# Patient Record
Sex: FEMALE | Race: WHITE | NOT HISPANIC OR LATINO | Employment: FULL TIME | URBAN - METROPOLITAN AREA
[De-identification: names, ages, dates, MRNs, and addresses within clinical notes are randomized per-mention and may not be internally consistent; named-entity substitution may affect disease eponyms.]

---

## 2021-06-05 ENCOUNTER — TRANSFERRED RECORDS (OUTPATIENT)
Dept: HEALTH INFORMATION MANAGEMENT | Facility: CLINIC | Age: 41
End: 2021-06-05

## 2021-06-09 LAB
CREAT SERPL-MCNC: 0.59 MG/DL (ref 0.57–1.11)
GFR SERPL CREATININE-BSD FRML MDRD: >60 ML/MIN/1.73M(2)
GLUCOSE SERPL-MCNC: 121 MG/DL (ref 70–100)
POTASSIUM SERPL-SCNC: 4 MMOL/L (ref 3.5–5.1)

## 2021-06-10 NOTE — PROGRESS NOTES
[facility]  Transfer Triage Note    Date of call: 06/09/21  Time of call: 9:09 PM    Is pandemic COVID-19 a concern? NO    Reason for transfer: Procedure can be done here and not at referring hospital  Further diagnostic work up, management, and consultation for specialized care   Diagnosis: concern for necrotizing pancreatitis     Outside Records: Not available  Additional records requested to be faxed to [phone].    Stability of Patient: Patient is at risk for instability, however patient requires urgent transfer and does not meet ICU criteria   ICU: No    Expected Time of Arrival for Transfer: greater than 24 hours    Arrival Location:  [address] [address] Phone: [phone]    Recommendations for Management and Stabilization: Not needed    Additional Comments:      Patient is a 40-year-old woman with recent suspected gallstone pancreatitis, s/p CCY in April 2021, who has been admitted several times at [place] for complications since.  She has since developed a fluid collection, admitted 6/4 for abdominal pain and vomiting, has since in the last 24 hours developed worsening tachycardia, fever, otherwise blood pressure is stable on 1 L of oxygen (primarily for comfort without significant respiratory symptoms).  Overall concern for necrotizing pancreatitis and potential infection, currently on meropenem and vancomycin.    Most recent imaging from 6/7 demonstrates a 6.3 cm fluid collection in the pancreas.  [facility] provider indicates that this patient was discussed in multidisciplinary conference today that included Dr. Freeman of GI panc/bili who suggest she may require transfer for endoscopic management ([facility] do have IR capabilities however).     No PACS imaging available in our system, nor [facility] records available.     At this point Dr. Beck recommends [facility] team push images in PACS for  direct review. Will place on the wait list but discussed that there is no bed available tonight at [facility], though would be reasonable for med-surg, no tele bed. GI will get back to [place] team tomorrow regarding potential for IR intervention instead vs timing of transfer vs other management recommendations. Depending on timing, suggest an updated doc-to-doc discussion to ensure med-surg is still appropriate level of care.     Josie Perez MD

## 2021-06-13 ENCOUNTER — HOSPITAL ENCOUNTER (INPATIENT)
Facility: CLINIC | Age: 41
LOS: 9 days | Discharge: HOME HEALTH | End: 2021-06-23
Attending: INTERNAL MEDICINE | Admitting: INTERNAL MEDICINE
Payer: COMMERCIAL

## 2021-06-13 ENCOUNTER — TRANSFERRED RECORDS (OUTPATIENT)
Dept: HEALTH INFORMATION MANAGEMENT | Facility: CLINIC | Age: 41
End: 2021-06-13

## 2021-06-13 DIAGNOSIS — K85.12 ACUTE BILIARY PANCREATITIS WITH INFECTED NECROSIS: Primary | ICD-10-CM

## 2021-06-13 LAB
CREAT SERPL-MCNC: 0.54 MG/DL (ref 0.57–1.11)
GFR SERPL CREATININE-BSD FRML MDRD: >60 ML/MIN/1.73M2
GLUCOSE SERPL-MCNC: 123 MG/DL (ref 70–100)
POTASSIUM SERPL-SCNC: 4.5 MMOL/L (ref 3.5–5.1)

## 2021-06-14 ENCOUNTER — APPOINTMENT (OUTPATIENT)
Dept: GENERAL RADIOLOGY | Facility: CLINIC | Age: 41
End: 2021-06-14
Attending: INTERNAL MEDICINE
Payer: COMMERCIAL

## 2021-06-14 ENCOUNTER — APPOINTMENT (OUTPATIENT)
Dept: GENERAL RADIOLOGY | Facility: CLINIC | Age: 41
End: 2021-06-14
Attending: STUDENT IN AN ORGANIZED HEALTH CARE EDUCATION/TRAINING PROGRAM
Payer: COMMERCIAL

## 2021-06-14 PROBLEM — K85.90 PANCREATITIS: Status: ACTIVE | Noted: 2021-06-14

## 2021-06-14 LAB
ALBUMIN SERPL-MCNC: 2 G/DL (ref 3.4–5)
ALP SERPL-CCNC: 61 U/L (ref 40–150)
ALT SERPL W P-5'-P-CCNC: 14 U/L (ref 0–50)
ANION GAP SERPL CALCULATED.3IONS-SCNC: 2 MMOL/L (ref 3–14)
APTT PPP: 49 SEC (ref 22–37)
AST SERPL W P-5'-P-CCNC: 12 U/L (ref 0–45)
BASOPHILS # BLD AUTO: 0.1 10E9/L (ref 0–0.2)
BASOPHILS NFR BLD AUTO: 0.5 %
BILIRUB SERPL-MCNC: 0.5 MG/DL (ref 0.2–1.3)
BUN SERPL-MCNC: 6 MG/DL (ref 7–30)
CALCIUM SERPL-MCNC: 8.4 MG/DL (ref 8.5–10.1)
CHLORIDE SERPL-SCNC: 100 MMOL/L (ref 94–109)
CO2 SERPL-SCNC: 32 MMOL/L (ref 20–32)
CREAT SERPL-MCNC: 0.51 MG/DL (ref 0.52–1.04)
CRP SERPL-MCNC: 120 MG/L (ref 0–8)
DIFFERENTIAL METHOD BLD: ABNORMAL
EOSINOPHIL # BLD AUTO: 0.5 10E9/L (ref 0–0.7)
EOSINOPHIL NFR BLD AUTO: 5 %
ERYTHROCYTE [DISTWIDTH] IN BLOOD BY AUTOMATED COUNT: 15.7 % (ref 10–15)
GFR SERPL CREATININE-BSD FRML MDRD: >90 ML/MIN/{1.73_M2}
GLUCOSE SERPL-MCNC: 98 MG/DL (ref 70–99)
HCT VFR BLD AUTO: 25.6 % (ref 35–47)
HGB BLD-MCNC: 7.7 G/DL (ref 11.7–15.7)
IMM GRANULOCYTES # BLD: 0.2 10E9/L (ref 0–0.4)
IMM GRANULOCYTES NFR BLD: 1.9 %
INR PPP: 1.12 (ref 0.86–1.14)
INTERPRETATION ECG - MUSE: NORMAL
LABORATORY COMMENT REPORT: NORMAL
LACTATE BLD-SCNC: 0.9 MMOL/L (ref 0.7–2)
LYMPHOCYTES # BLD AUTO: 1.2 10E9/L (ref 0.8–5.3)
LYMPHOCYTES NFR BLD AUTO: 11.3 %
MAGNESIUM SERPL-MCNC: 2.2 MG/DL (ref 1.6–2.3)
MCH RBC QN AUTO: 25.8 PG (ref 26.5–33)
MCHC RBC AUTO-ENTMCNC: 30.1 G/DL (ref 31.5–36.5)
MCV RBC AUTO: 86 FL (ref 78–100)
MONOCYTES # BLD AUTO: 0.9 10E9/L (ref 0–1.3)
MONOCYTES NFR BLD AUTO: 8.4 %
NEUTROPHILS # BLD AUTO: 7.5 10E9/L (ref 1.6–8.3)
NEUTROPHILS NFR BLD AUTO: 72.9 %
NRBC # BLD AUTO: 0 10*3/UL
NRBC BLD AUTO-RTO: 0 /100
PHOSPHATE SERPL-MCNC: 4.3 MG/DL (ref 2.5–4.5)
PLATELET # BLD AUTO: 425 10E9/L (ref 150–450)
POTASSIUM SERPL-SCNC: 4.1 MMOL/L (ref 3.4–5.3)
PROCALCITONIN SERPL-MCNC: 0.47 NG/ML
PROT SERPL-MCNC: 6.9 G/DL (ref 6.8–8.8)
RBC # BLD AUTO: 2.99 10E12/L (ref 3.8–5.2)
SARS-COV-2 RNA RESP QL NAA+PROBE: NEGATIVE
SODIUM SERPL-SCNC: 133 MMOL/L (ref 133–144)
SPECIMEN SOURCE: NORMAL
WBC # BLD AUTO: 10.3 10E9/L (ref 4–11)

## 2021-06-14 PROCEDURE — 87040 BLOOD CULTURE FOR BACTERIA: CPT | Performed by: STUDENT IN AN ORGANIZED HEALTH CARE EDUCATION/TRAINING PROGRAM

## 2021-06-14 PROCEDURE — 999N000044 HC STATISTIC CVC DRESSING CHANGE

## 2021-06-14 PROCEDURE — 93010 ELECTROCARDIOGRAM REPORT: CPT | Performed by: INTERNAL MEDICINE

## 2021-06-14 PROCEDURE — 84100 ASSAY OF PHOSPHORUS: CPT | Performed by: STUDENT IN AN ORGANIZED HEALTH CARE EDUCATION/TRAINING PROGRAM

## 2021-06-14 PROCEDURE — U0005 INFEC AGEN DETEC AMPLI PROBE: HCPCS | Performed by: STUDENT IN AN ORGANIZED HEALTH CARE EDUCATION/TRAINING PROGRAM

## 2021-06-14 PROCEDURE — U0003 INFECTIOUS AGENT DETECTION BY NUCLEIC ACID (DNA OR RNA); SEVERE ACUTE RESPIRATORY SYNDROME CORONAVIRUS 2 (SARS-COV-2) (CORONAVIRUS DISEASE [COVID-19]), AMPLIFIED PROBE TECHNIQUE, MAKING USE OF HIGH THROUGHPUT TECHNOLOGIES AS DESCRIBED BY CMS-2020-01-R: HCPCS | Performed by: STUDENT IN AN ORGANIZED HEALTH CARE EDUCATION/TRAINING PROGRAM

## 2021-06-14 PROCEDURE — 99222 1ST HOSP IP/OBS MODERATE 55: CPT | Performed by: PHYSICIAN ASSISTANT

## 2021-06-14 PROCEDURE — 250N000013 HC RX MED GY IP 250 OP 250 PS 637: Performed by: STUDENT IN AN ORGANIZED HEALTH CARE EDUCATION/TRAINING PROGRAM

## 2021-06-14 PROCEDURE — 84145 PROCALCITONIN (PCT): CPT | Performed by: STUDENT IN AN ORGANIZED HEALTH CARE EDUCATION/TRAINING PROGRAM

## 2021-06-14 PROCEDURE — 999N000065 XR CHEST PORT 1 VIEW

## 2021-06-14 PROCEDURE — 83605 ASSAY OF LACTIC ACID: CPT | Performed by: INTERNAL MEDICINE

## 2021-06-14 PROCEDURE — 999N000065 XR ABDOMEN PORT 1 VIEWS

## 2021-06-14 PROCEDURE — 36415 COLL VENOUS BLD VENIPUNCTURE: CPT | Performed by: INTERNAL MEDICINE

## 2021-06-14 PROCEDURE — 250N000011 HC RX IP 250 OP 636: Performed by: STUDENT IN AN ORGANIZED HEALTH CARE EDUCATION/TRAINING PROGRAM

## 2021-06-14 PROCEDURE — 83735 ASSAY OF MAGNESIUM: CPT | Performed by: STUDENT IN AN ORGANIZED HEALTH CARE EDUCATION/TRAINING PROGRAM

## 2021-06-14 PROCEDURE — 74018 RADEX ABDOMEN 1 VIEW: CPT | Mod: 26 | Performed by: RADIOLOGY

## 2021-06-14 PROCEDURE — 93005 ELECTROCARDIOGRAM TRACING: CPT

## 2021-06-14 PROCEDURE — 86140 C-REACTIVE PROTEIN: CPT | Performed by: STUDENT IN AN ORGANIZED HEALTH CARE EDUCATION/TRAINING PROGRAM

## 2021-06-14 PROCEDURE — 258N000003 HC RX IP 258 OP 636: Performed by: STUDENT IN AN ORGANIZED HEALTH CARE EDUCATION/TRAINING PROGRAM

## 2021-06-14 PROCEDURE — 85025 COMPLETE CBC W/AUTO DIFF WBC: CPT | Performed by: STUDENT IN AN ORGANIZED HEALTH CARE EDUCATION/TRAINING PROGRAM

## 2021-06-14 PROCEDURE — 120N000002 HC R&B MED SURG/OB UMMC

## 2021-06-14 PROCEDURE — 99223 1ST HOSP IP/OBS HIGH 75: CPT | Mod: AI | Performed by: INTERNAL MEDICINE

## 2021-06-14 PROCEDURE — 85610 PROTHROMBIN TIME: CPT | Performed by: STUDENT IN AN ORGANIZED HEALTH CARE EDUCATION/TRAINING PROGRAM

## 2021-06-14 PROCEDURE — 999N000127 HC STATISTIC PERIPHERAL IV START W US GUIDANCE

## 2021-06-14 PROCEDURE — 80053 COMPREHEN METABOLIC PANEL: CPT | Performed by: STUDENT IN AN ORGANIZED HEALTH CARE EDUCATION/TRAINING PROGRAM

## 2021-06-14 PROCEDURE — 36415 COLL VENOUS BLD VENIPUNCTURE: CPT | Performed by: STUDENT IN AN ORGANIZED HEALTH CARE EDUCATION/TRAINING PROGRAM

## 2021-06-14 PROCEDURE — 71045 X-RAY EXAM CHEST 1 VIEW: CPT | Mod: 26 | Performed by: RADIOLOGY

## 2021-06-14 PROCEDURE — 85730 THROMBOPLASTIN TIME PARTIAL: CPT | Performed by: STUDENT IN AN ORGANIZED HEALTH CARE EDUCATION/TRAINING PROGRAM

## 2021-06-14 RX ORDER — AMINO AC/PROTEIN HYDR/WHEY PRO 10G-100/30
1 LIQUID (ML) ORAL 4 TIMES DAILY
Status: DISCONTINUED | OUTPATIENT
Start: 2021-06-14 | End: 2021-06-16

## 2021-06-14 RX ORDER — OXYCODONE HCL 5 MG/5 ML
5 SOLUTION, ORAL ORAL EVERY 6 HOURS PRN
Status: ON HOLD | COMMUNITY
End: 2021-06-23

## 2021-06-14 RX ORDER — ALBUTEROL SULFATE 0.83 MG/ML
2.5 SOLUTION RESPIRATORY (INHALATION) EVERY 4 HOURS PRN
COMMUNITY

## 2021-06-14 RX ORDER — LIDOCAINE 40 MG/G
CREAM TOPICAL
Status: DISCONTINUED | OUTPATIENT
Start: 2021-06-14 | End: 2021-06-23 | Stop reason: HOSPADM

## 2021-06-14 RX ORDER — NALOXONE HYDROCHLORIDE 0.4 MG/ML
0.2 INJECTION, SOLUTION INTRAMUSCULAR; INTRAVENOUS; SUBCUTANEOUS
Status: DISCONTINUED | OUTPATIENT
Start: 2021-06-14 | End: 2021-06-23 | Stop reason: HOSPADM

## 2021-06-14 RX ORDER — ALPRAZOLAM 0.5 MG
0.5 TABLET ORAL 2 TIMES DAILY PRN
COMMUNITY

## 2021-06-14 RX ORDER — VITAMIN B COMPLEX
1 TABLET ORAL DAILY
COMMUNITY

## 2021-06-14 RX ORDER — ACETAMINOPHEN 325 MG/1
650 TABLET ORAL EVERY 4 HOURS PRN
Status: DISCONTINUED | OUTPATIENT
Start: 2021-06-14 | End: 2021-06-14

## 2021-06-14 RX ORDER — NALOXONE HYDROCHLORIDE 0.4 MG/ML
0.4 INJECTION, SOLUTION INTRAMUSCULAR; INTRAVENOUS; SUBCUTANEOUS
Status: DISCONTINUED | OUTPATIENT
Start: 2021-06-14 | End: 2021-06-23 | Stop reason: HOSPADM

## 2021-06-14 RX ORDER — HEPARIN SODIUM 5000 [USP'U]/.5ML
5000 INJECTION, SOLUTION INTRAVENOUS; SUBCUTANEOUS EVERY 12 HOURS
Status: DISCONTINUED | OUTPATIENT
Start: 2021-06-14 | End: 2021-06-23 | Stop reason: HOSPADM

## 2021-06-14 RX ORDER — ACETAMINOPHEN 325 MG/10.15ML
650 LIQUID ORAL EVERY 4 HOURS PRN
Status: DISCONTINUED | OUTPATIENT
Start: 2021-06-14 | End: 2021-06-17

## 2021-06-14 RX ORDER — PROCHLORPERAZINE MALEATE 5 MG
10 TABLET ORAL EVERY 6 HOURS PRN
Status: DISCONTINUED | OUTPATIENT
Start: 2021-06-14 | End: 2021-06-23 | Stop reason: HOSPADM

## 2021-06-14 RX ORDER — OXYCODONE HCL 5 MG/5 ML
5 SOLUTION, ORAL ORAL EVERY 4 HOURS PRN
Status: DISCONTINUED | OUTPATIENT
Start: 2021-06-14 | End: 2021-06-14

## 2021-06-14 RX ORDER — ONDANSETRON 2 MG/ML
4 INJECTION INTRAMUSCULAR; INTRAVENOUS EVERY 6 HOURS PRN
Status: DISCONTINUED | OUTPATIENT
Start: 2021-06-14 | End: 2021-06-23 | Stop reason: HOSPADM

## 2021-06-14 RX ORDER — MEROPENEM 1 G/1
1 INJECTION, POWDER, FOR SOLUTION INTRAVENOUS EVERY 8 HOURS
Status: DISCONTINUED | OUTPATIENT
Start: 2021-06-14 | End: 2021-06-15

## 2021-06-14 RX ORDER — DEXTROSE MONOHYDRATE 100 MG/ML
INJECTION, SOLUTION INTRAVENOUS CONTINUOUS PRN
Status: DISCONTINUED | OUTPATIENT
Start: 2021-06-14 | End: 2021-06-23 | Stop reason: HOSPADM

## 2021-06-14 RX ORDER — PROCHLORPERAZINE 25 MG
25 SUPPOSITORY, RECTAL RECTAL EVERY 12 HOURS PRN
Status: DISCONTINUED | OUTPATIENT
Start: 2021-06-14 | End: 2021-06-23 | Stop reason: HOSPADM

## 2021-06-14 RX ORDER — ALBUTEROL SULFATE 90 UG/1
2 AEROSOL, METERED RESPIRATORY (INHALATION) EVERY 6 HOURS PRN
Status: DISCONTINUED | OUTPATIENT
Start: 2021-06-14 | End: 2021-06-23 | Stop reason: HOSPADM

## 2021-06-14 RX ORDER — ONDANSETRON 4 MG/1
TABLET, FILM COATED ORAL EVERY 6 HOURS PRN
COMMUNITY

## 2021-06-14 RX ORDER — OXYCODONE HCL 5 MG/5 ML
5-10 SOLUTION, ORAL ORAL EVERY 4 HOURS PRN
Status: DISCONTINUED | OUTPATIENT
Start: 2021-06-14 | End: 2021-06-22

## 2021-06-14 RX ORDER — SODIUM CHLORIDE, SODIUM LACTATE, POTASSIUM CHLORIDE, CALCIUM CHLORIDE 600; 310; 30; 20 MG/100ML; MG/100ML; MG/100ML; MG/100ML
INJECTION, SOLUTION INTRAVENOUS CONTINUOUS
Status: DISCONTINUED | OUTPATIENT
Start: 2021-06-14 | End: 2021-06-16

## 2021-06-14 RX ORDER — FLUTICASONE PROPIONATE 110 UG/1
2 AEROSOL, METERED RESPIRATORY (INHALATION) 2 TIMES DAILY
COMMUNITY

## 2021-06-14 RX ORDER — ALBUTEROL SULFATE 90 UG/1
1-2 AEROSOL, METERED RESPIRATORY (INHALATION) EVERY 4 HOURS PRN
COMMUNITY

## 2021-06-14 RX ORDER — IBUPROFEN 100 MG/5ML
600 SUSPENSION, ORAL (FINAL DOSE FORM) ORAL EVERY 6 HOURS PRN
COMMUNITY

## 2021-06-14 RX ORDER — ONDANSETRON 4 MG/1
4 TABLET, ORALLY DISINTEGRATING ORAL EVERY 6 HOURS PRN
Status: DISCONTINUED | OUTPATIENT
Start: 2021-06-14 | End: 2021-06-23 | Stop reason: HOSPADM

## 2021-06-14 RX ADMIN — ONDANSETRON 4 MG: 2 INJECTION INTRAMUSCULAR; INTRAVENOUS at 18:04

## 2021-06-14 RX ADMIN — ACETAMINOPHEN 650 MG: 325 SOLUTION ORAL at 15:16

## 2021-06-14 RX ADMIN — ACETAMINOPHEN 650 MG: 325 TABLET, FILM COATED ORAL at 06:04

## 2021-06-14 RX ADMIN — HEPARIN SODIUM 5000 UNITS: 5000 INJECTION, SOLUTION INTRAVENOUS; SUBCUTANEOUS at 09:47

## 2021-06-14 RX ADMIN — ACETAMINOPHEN 650 MG: 325 SOLUTION ORAL at 22:33

## 2021-06-14 RX ADMIN — ONDANSETRON 4 MG: 2 INJECTION INTRAMUSCULAR; INTRAVENOUS at 11:38

## 2021-06-14 RX ADMIN — SODIUM CHLORIDE, POTASSIUM CHLORIDE, SODIUM LACTATE AND CALCIUM CHLORIDE 1000 ML: 600; 310; 30; 20 INJECTION, SOLUTION INTRAVENOUS at 06:29

## 2021-06-14 RX ADMIN — OXYCODONE HYDROCHLORIDE 10 MG: 5 SOLUTION ORAL at 20:32

## 2021-06-14 RX ADMIN — MEROPENEM 1 G: 1 INJECTION, POWDER, FOR SOLUTION INTRAVENOUS at 04:07

## 2021-06-14 RX ADMIN — SODIUM CHLORIDE, POTASSIUM CHLORIDE, SODIUM LACTATE AND CALCIUM CHLORIDE: 600; 310; 30; 20 INJECTION, SOLUTION INTRAVENOUS at 04:48

## 2021-06-14 RX ADMIN — HEPARIN SODIUM 5000 UNITS: 5000 INJECTION, SOLUTION INTRAVENOUS; SUBCUTANEOUS at 20:34

## 2021-06-14 RX ADMIN — MEROPENEM 1 G: 1 INJECTION, POWDER, FOR SOLUTION INTRAVENOUS at 22:26

## 2021-06-14 RX ADMIN — MEROPENEM 1 G: 1 INJECTION, POWDER, FOR SOLUTION INTRAVENOUS at 11:41

## 2021-06-14 RX ADMIN — PROCHLORPERAZINE EDISYLATE 10 MG: 5 INJECTION INTRAMUSCULAR; INTRAVENOUS at 05:49

## 2021-06-14 RX ADMIN — ONDANSETRON 4 MG: 2 INJECTION INTRAMUSCULAR; INTRAVENOUS at 04:07

## 2021-06-14 RX ADMIN — OXYCODONE HYDROCHLORIDE 10 MG: 5 SOLUTION ORAL at 02:04

## 2021-06-14 RX ADMIN — Medication 1 PACKET: at 16:39

## 2021-06-14 ASSESSMENT — ACTIVITIES OF DAILY LIVING (ADL)
ADLS_ACUITY_SCORE: 16
ADLS_ACUITY_SCORE: 14
DEPENDENT_IADLS:: LAUNDRY;CLEANING
ADLS_ACUITY_SCORE: 14
ADLS_ACUITY_SCORE: 14
ADLS_ACUITY_SCORE: 16

## 2021-06-14 ASSESSMENT — MIFFLIN-ST. JEOR: SCORE: 1745

## 2021-06-14 NOTE — CONSULTS
Advanced Endoscopy/Pancreaticobiliary Consultation      Date of Admission:  6/13/2021  Reason for Admission: Nausea/vomiting  Date of Consult  6/14/2021   Requesting Physician:  Josie Perez MD           ASSESSMENT AND RECOMMENDATIONS:   Assessment:  40 year old female with a history of gallstone pancreatitis who underwent cholecystectomy (4/28) and ERCP (4/29) for choledocholithiasis at OSH who was transferred for concern of infected walled off necrotic collections.    #. Acute necrotizing pancreatitis with presumed infected walled off necrotic collections  #. Abdominal pain  #. Intermittent nausea and vomiting  Patient initially presented to OSH on 4/25 with acute abdominal pain, nausea and vomiting, lipase >5000, BISAP 1, underwent cholecystectomy as well as ERCP the following day for positive IOC. Uncomplicated hospital stay post tracey but returned in May for nausea, vomiting and abdominal pain, found to have developing acute necrotic collections. NJT placed on 5/18 but patient unable to tolerate much due to N/V. Now admitted with fevers and leukocytosis as well as walled off necrotic collections that are possibly source of infection. The largest collection is in the right abdomen but not in a good location for endoscopic transluminal drainage so will discuss with IR regarding percutaneous drainage.  Etiology: biliary  Date of onset: 4/25/2021  BISAP score on admission: BISAP 1 (SIRS)  Concurrent organ failure: none  Thromboses: none  Diabetes: no  Current nutrition access: NJT  Last imaging: CT scan abd/pelv with IV contrast 6/7  Infection/antiinfectives: Currently Meropenem  Interventions:    4/28: Cholecystectomy    4/29: ERCP for choledocholithiasis seen on IOC    Recommendations:  IR consult for RP perc drain into R flank  Continue antibiotics, follow cultures (please send drain fluid for cultures)  OK for diet today, NPO at midnight  Continue NJTF - hopefully will be able to advance diet and remove  tube after drainage  Analgesia/Antiemetics per primary team  Discussed with primary medicine team    Gastroenterology follow up recommendations: BRAD    Thank you for involving us in this patient's care. Please do not hesitate to contact the GI service with any questions or concerns.     Pt seen and care plan discussed with Dr. Aguiar, GI staff physician.    Yoana Abbott PA-C  Advanced Endoscopy/Pancreaticobiliary GI Service  [facility]  Text Page  -------------------------------------------------------------------------------------------------------------------       Reason for Consultation:   Necrotizing pancreatitis           History of Present Illness:   Patient seen and examined at 0930. History is obtained from the patient.    Alexandra Nunez is a 40 year old female who initially presented OSH on 4/25 with acute abdominal pain, nausea and vomiting, underwent cholecystectomy as well as ERCP the following day for positive IOC. Uncomplicated hospital stay post tracey but returned in May for nausea, vomiting and abdominal pain, found to have developing acute necrotic collections. NJT placed on 5/18 because of inability to tolerate po. Still had abdominal pain on/off but managed with po pain medications. Her NJT fell out earlier this month so presented to the hospital in [place] where they suggested oral diet trial but she failed this so came back again on 6/7. This time she was having fevers and also had leukocytosis on admission. She was transferred to [facility] for further care.    Upon arrival to [facility], she was afebrile however spiked a temp to 101.2 early this morning. She has also been tachycardic into the 110s. She is not hypotensive or hypoxic. Lab eval showed normal liver tests, , WBC 10.3, hgb 7.7. She was continued on meropenem that was started at the OSH and repeat blood cultures were obtained this morning. Currently, the patient reports that her pain is under  control and is tolerating tube feeds. 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    Alcohol: denies  Tobacco: denies  Illicit drugs: denies           Family History:   Patient's family history is reviewed today and is non-contributory         Allergies:   Reviewed and edited as appropriate     Allergies   Allergen Reactions     Nkda [No Known Drug Allergies]             Medications:     Current Facility-Administered Medications   Medication     acetaminophen (TYLENOL) solution 650 mg     albuterol (PROAIR HFA/PROVENTIL HFA/VENTOLIN HFA) 108 (90 Base) MCG/ACT inhaler 2 puff     dextrose 10% infusion     heparin ANTICOAGULANT injection 5,000 Units     lactated ringers infusion     lidocaine (LMX4) cream     lidocaine 1 % 0.1-1 mL     melatonin tablet 1 mg     meropenem (MERREM) 1 g vial to attach to  mL bag     naloxone (NARCAN) injection 0.2 mg    Or     naloxone (NARCAN) injection 0.4 mg    Or     naloxone (NARCAN) injection 0.2 mg    Or     naloxone (NARCAN) injection 0.4 mg     ondansetron (ZOFRAN-ODT) ODT tab 4 mg    Or     ondansetron (ZOFRAN) injection 4 mg     oxyCODONE (ROXICODONE) solution 5-10 mg     prochlorperazine (COMPAZINE) injection 10 mg    Or     prochlorperazine (COMPAZINE) tablet 10 mg    Or     prochlorperazine (COMPAZINE) suppository 25 mg     protein modular (PROSOURCE TF) 1 packet     sodium chloride (PF) 0.9% PF flush 3 mL     sodium chloride (PF) 0.9% PF flush 3 mL             Review of Systems:     A complete review of systems was performed and is negative except  as noted in the HPI           Physical Exam:   Temp: 101.2  F (38.4  C) Temp src: Axillary BP: (!) 156/90 Pulse: 129   Resp: 18 SpO2: 93 % O2 Device: None (Room air)    Wt:   Wt Readings from Last 2 Encounters:   06/14/21 97.3 kg (214 lb 8.1 oz)        General: WDWN female in NAD.  Answers appropriately.    HEENT: Head is AT/NC. Sclera anicteric. No conjunctival injection.  Oropharynx is clear, moist and w/o exudate or lesions.  Neck: No masses or thyromegaly.  Lungs: Clear to auscultation bilaterally.  No wheezes, rhonchi or crackles.    Heart: tachycardic but regular.  No murmurs, gallops or rubs.  Normal S1 and S2.  Abdomen: Soft, tender across upper abdomen, non-distended.  BS +. No rebound or peritoneal signs, surgical scars CDI  Extremities: WWP, no pedal edema.  Skin: No jaundice or rash  Neurologic: Grossly non-focal.  CN 2-12 grossly intact.            Data:   Labs and imaging below were independently reviewed and interpreted    LAB WORK:    BMP  Recent Labs   Lab 06/14/21  0249      POTASSIUM 4.1   CHLORIDE 100   MARTIN 8.4*   CO2 32   BUN 6*   CR 0.51*   GLC 98     CBC  Recent Labs   Lab 06/14/21  0249   WBC 10.3   RBC 2.99*   HGB 7.7*   HCT 25.6*   MCV 86   MCH 25.8*   MCHC 30.1*   RDW 15.7*        INR  Recent Labs   Lab 06/14/21  0249   INR 1.12     LFTs  Recent Labs   Lab 06/14/21  0249   ALKPHOS 61   AST 12   ALT 14   BILITOTAL 0.5   PROTTOTAL 6.9   ALBUMIN 2.0*      PANCNo lab results found in last 7 days.    IMAGING:  CT abd/pelv with IV contrast 6/7 (OSH)  IMPRESSION:  1. Multiple rim enhancing fluid collections.  Largest collection is in the  right mid abdomen measuring 6.3 cm.  There are foci of low density within these  collections however on lung windows, has similar attenuation to the adjacent  fat and likely represents entrapped fat within the collections.  There are  however 2 small foci of air adjacent to the pancreas at the mid body portion  without a sizable collection which  does not appear significantly changed from  previous.  These are consistent with acute peripancreatic fluid collections.  2. There is normal enhancement of the pancreas with no evidence for necrotizing  pancreatitis.  3. Increasing left effusion and bibasilar atelectasis.  4. Thickening of the colon at the hepatic flexure and proximal transverse  colon.  Likely reactive changes due to the adjacent acute peripancreatic fluid  collection.        =======================================================================

## 2021-06-14 NOTE — H&P
INTERNAL MEDICINE HISTORY & PHYSICAL   Alexandra Nunez ([id number]) admitted on 6/13/2021  Primary care provider: No primary care provider on file.         Chief Complaint:     Abdominal pain, possible peripancreatic fluid collection drainage           Assessment and Plans   Alexnadra Nunez is a 40-year-old woman with a hx of gallstone pancreatitis s/p cholecystectomy w/ERCP 4/2021 c/b 5 cm peripancreatic fluid collection abutting the stomach necessitating NJ tube placement for tube feeding, recently admitted to [facility] for persistent abdominal pain with new concern for infected peripancreatic fluid collection. She is transferred here for possible drainage of this fluid collection.     # Gallstone pancreatitis s/p ERCP and lap cholecystectomy  # C/b peripancreatic fluid collection with concern for infection   Initially presented in April w/gallstone pancreatitis, had cholecystectomy and ERCP done though post hospital course complicated by poor oral intake and recurrent abdominal pain and vomiting, repeat imaging done found 5 cm peripancreatic fluid collection, thus NJ tube was put in for nutrition. Repeat admission for abdominal pain and CT A/P showed multiple rim enhancing fluid collection, largest of which in right mid abdomen measuring 6.3 cm with 2 other small foci of suspected acute peripancreatic fluid collections. No evidence of necrotizing pancreatitis. Concern raised for infection given high fevers to 104, started initially on meropenem and vancomycin though transferred while only on meropenem. Discussion had with [facility] while at [place], with plans either for IR drainage or endoscopic drainage, transferred here for further management.  - Fluids: LR at 100 ml/hr  - Antibiotics: continue meropenem for now   - Pain control: acetaminophen prn, oxycodone solution 5-10 mg q4h prn   - Antiemetic: prn zofran/compazine (EKG done for QTc)  - GI consulted, appreciate recommendations regarding  possible endoscopic drainage  - XR abdomen to verify NJ placement  - XR chest to verify PICC placement  - Labs: CMP, CBC, CRP, PTT/INR    # Nutrition  Was getting 50 ml/hr Osmolite 1.5 Sergei with q4h free water flushes, tolerating ok.   - Nutrition consult here  - Will keep NPO for now until timing of endoscopic drainage decided     # Anxiety: was intermittently taking xanax at home, can prescribe if anxiety here  # Mild intermittent asthma: PTA albuterol     FEN: NPO for now  DVT Prophylaxis:  Heparin   GI Prophylaxis: none  Disposition: Inpatient no tele   Code Status: Full    To be formally staffed in AM    Coy Arcos MD  PGY-1 Internal Medicine  Pager [phone]         History of Present Illness     Alexandra Nunez is a 40-year-old woman with a hx of gallstone pancreatitis s/p cholecystectomy w/ERCP 4/2021 c/b 5 cm peripancreatic fluid collection abutting the stomach necessitating NJ tube placement for tube feeding, recently admitted to [facility] for persistent abdominal pain with new concern for infected peripancreatic fluid collection. She is transferred here for possible drainage of this fluid collection.    Drainage of right pericolic gutter recommended by [facility]. Gram-positive bacteremia growing strep angionosus.     Symptomatically, Alexandra endorses periumbilical abdominal pain at about 6/10. At the outside hospital, she was getting pain control with liquid oxycodone which helped with pain. Pain is band like across abdomen. She had her last bowel movement yesterday, recently constipated and got an enema for it. No blood in stool. Had been getting continuous tube feeding tolerating decently. She was febrile up to 104 at [place] which prompted starting vancomycin and meropenem for suspected infected peripancreatic fluid collection given nonremitting fevers also noted prior up to 102. She was last febrile before transfer she states. Blood cultures done apparently growing Strep  angionosus. No current subjective fevers or chills.     Most recent imaging from 6/7 demonstrated a 6.3 cm fluid collection in the pancreas.  [facility] provider indicates that this patient was discussed in multidisciplinary conference today that included Dr. Freeman of GI panc/bili who suggested transfer for endoscopic management, drainage of R paracolic gutter.     12 point ROS was negative other than noted in HPI.          Other Pertinent History   Social history, past medical history, surgical history, medications, allergies, and family history were asked and reviewed with the patient, pertinent findings as below.    Social history: lives at home with two children, ages ~16 years old    PMHx: gallstone pancreatitis, anxiety     PSHx: No past surgical history on file.    Meds: reviewed, no currently taking gabapentin, midodrine, topiramate. Rarely takes xanax for anxiety     Allergies:   Allergies   Allergen Reactions     Nkda [No Known Drug Allergies]      Family history:   No family history on file.         Vitals and Exam     /78   Pulse 115   Temp 97.4  F (36.3  C) (Oral)   Resp 16   SpO2 94%   Wt Readings from Last 2 Encounters:   No data found for Wt     No intake or output data in the 24 hours ending 06/14/21 0024    PHYSICAL EXAM:    General: A&Ox4; well-appearing; in no acute distress.  HEENT: NC/AT. EOMI. No scleral icterus. No rhinorrhea. Neck supple.  CV: RRR; no M/R/G. No LE edema noted. Radial pulses 2+ bilaterally.    Pulmonary: Symmetric chest movement. Lungs CTAB. No crackles, rhonchi, or wheezes. No use of  accessory muscles nor was there any retractions.   GI: Soft, non-distended, tender to palpation in perumbilical region  MSK: Normal range of motion.  No edema.  SKIN: Overall warm and dry. No rashes or bruises noted.  Neuro: Cranial nerves II-XII grossly intact.  Normal gait.         Labs     Pertinent labs from [place]    Hgb 7.6->8.0->8.1  WBC 7.9->8.3->9.9  Platelets  330->377->396  Na 138->136->135  Potassium 4.5  CO2 32  Cr 0.51->0.57->0.54  Calcium 7.9->8.2->8.2  AST 13, ALT 8, alk phos 121->59  Tbili 0.4, direct bili 0.2  Procalcitonin 6/9/21  TSH 0.26, FT4 0.84 (6/9/21)  CRP 6/5/21 16.36    CT A/P 6/7/21:  EXAM:  CT ABD AND PELVIS WITHOUT AND WITH IV CONTRAST    INDICATION:  eval for necrosis, eval air in fluid collection,No - order will remain as is.  The imaging team may contact you for additional info.    TECHNIQUE:  Sequential axial images are obtained from the thoracic inlet through the  ischial rami without and with intravenous contrast. Multiplanar reconstructions  obtained.    While obtaining CT images, dose reduction techniques were utilized including:    Automated exposure control, adjustment of mA and/or kV according to patient  size, and/or use of iterative reconstruction technique.    CONTRAST:  omni 350 120 ml    RADIATION DOSE:  1917 DLP (mGy cm)    COMPARISON:  Noncontrast CT of the chest/abdomen/pelvis from 06/04/2021    FINDINGS:  There are multiple rim enhancing fluid collections in the abdomen.  For  example, 4.0 x 2.7 cm collection in the gastrohepatic ligament region on image  70 of series 17.  Smaller elongated fluid collection seen at the pancreatic  tail which extends down the left pericolic gutter.  There are foci of low  density within the left pericolic gutter collection however and lung windows  appears to have similar attenuation to that of the adjacent fat.  Dominant  right mid abdominal collection adjacent to the hepatic flexure of the colon  measures 6.3 x 4.8 cm on image 115 of series 17.  This also has a focus of low  density which has similar 10 UA shin of that of the adjacent fat.  There are  additional smaller rim enhancing collections in the upper abdomen adjacent to  the pancreas.  There are 2 foci of air within the soft tissues adjacent to the  pancreas on image 101 of series 17 which do not correspond to loops of bowel.  The pancreas  demonstrates normal enhancement with no evidence for pancreatic  necrosis.  There is no pancreatic ductal dilatation.  Prominence of the  extrahepatic common bile duct however not unexpected given prior  cholecystectomy.  Small bilobar hepatic cysts.  There is a NJ tube present with  distal tip beyond the ligament of Treitz in the proximal jejunum.  Tube has a  loop in the stomach.  Left splenic cyst.  Small large bowel loops are normal in  diameter.  There is some thickening of the hepatic flexure of the colon and the  proximal transverse colon which likely is reactive due to the adjacent fluid  collection.  The appendix appears normal.  IUD present in the uterus.  Prominent retroperitoneal nodes are likely reactive.  There is a moderate  left-sided effusion with bibasilar atelectasis.  Effusion has increased in size  compared to previous CT.  Portal vein, mesenteric vein, and splenic vein are  patent.  Kidneys and adrenal glands are unremarkable.    IMPRESSION:  1. Multiple rim enhancing fluid collections.  Largest collection is in the  right mid abdomen measuring 6.3 cm.  There are foci of low density within these  collections however on lung windows, has similar attenuation to the adjacent  fat and likely represents entrapped fat within the collections.  There are  however 2 small foci of air adjacent to the pancreas at the mid body portion  without a sizable collection which does not appear significantly changed from  previous.  These are consistent with acute peripancreatic fluid collections.  2. There is normal enhancement of the pancreas with no evidence for necrotizing  pancreatitis.  3. Increasing left effusion and bibasilar atelectasis.  4. Thickening of the colon at the hepatic flexure and proximal transverse  colon.  Likely reactive changes due to the adjacent acute peripancreatic fluid  collection.

## 2021-06-14 NOTE — PROGRESS NOTES
[facility], [place]     Daily Progress Note - Brant 5 Service        Date of service: 06/14/21      Assessment & Plan  Alexandra Nunez is a 40-year-old woman with history of gallstone pancreatitis s/p cholecystectomy w/ ERCP 4/2021 c/b peripancreatic fluid collection abutting the stomach necessitating NJ tube placement, who was recently admitted to [facility] for persistent abdominal pain with new concern for infected peripancreatic fluid collection. She is transferred here for further management.    Acute peripancreatic fluid collections  Hx gallstone pancreatitis s/p cholecystectomy & ERCP (4/2021)  Initially presented with gallstone pancreatitis in 4/2021, underwent lap tracey on 4/28 and ERCP on 4/29. Recovery c/b development of acute enrrique-pancreatic fluid collections and inability to tolerate PO intake and required NJT placement 5/18 with tube feeds at home. Now presenting with increased abdominal pain and fevers concerning for infected fluid collection.  - GI panc/bili consult, appreciate recs  - Anticipate drainage -- endoscopically vs IR?  - Continue meropenem  - Pain control: tylenol prn, liquid oxycodone prn -- currently well-controlled with this, could add IV dilaudid if worsens  - Anti-emetics prn  - Continue LR at 100 ml/hr    Severe malnutrition in the context of acute on chronic illness  Based on: weight loss, moderate/severe subcutaneous fat loss, moderate/severe muscle loss. NJ in place due to inability to tolerate PO.  - Nutrition consult  - Add-on phos  - TF per dietician orders    Chronic problems:  2nd-degree AV block c/b syncopal episodes s/p pacemaker: Medtronic pacemaker placed when she was 14, has had known non-functioning atrial lead for years.   Anxiety: Was intermittently taking xanax at home. Monitor symptoms here.  Mild intermittent asthma: Albuterol prn.      Diet: NPO, TF per dietician  DVT Prophylaxis: SubQ heparin  Code Status: Full  code    Disposition:  Expected discharge: 4 - 7 days, recommended to prior living arrangement once adequate pain management/ tolerating PO medications, antibiotic plan established and pancreatic fluid collections managed.      Patient staffed with attending physician, Dr. Lutz.    Amy Glaser MD  Internal Medicine, PGY-2  Mar[place] 5 Service  Pager: [phone]  [facility]   Please see sign in/sign out for up to date coverage information  _____________________________________________________________________    Interval History  Nursing notes reviewed.  Patient was admitted overnight. Her pain is across her abdomen, but is currently well managed with oral oxycodone as needed.  She also has a little bit of mild nausea at times but has not been vomiting.  When she is acutely febrile she says that she feels terrible, but when the fever resolves she feels fairly well.    Physical Exam  BP (!) 156/90   Pulse 129   Temp 101.2  F (38.4  C) (Axillary)   Resp 18   Ht 1.829 m (6')   Wt 97.3 kg (214 lb 8.1 oz)   SpO2 93%   BMI 29.09 kg/m    214 lbs 8.12 oz    Constitutional: awake, alert, cooperative, no acute distress  Respiratory: breathing comfortably, clear to auscultation bilaterally, no crackles or wheezing  Cardiovascular: regular rate and rhythm, normal S1 and S2, no murmur noted  GI: soft, non-distended, mildly tender across epigastric area, BS+, NJ in place  Skin: normal skin color, texture, turgor  Musculoskeletal: no lower extremity edema present  Neurologic: alert and oriented x3, CNs II-XII grossly intact, moving all extremities equally      Labs and Imaging  All labs and imaging reviewed. Pertinent below.    Lytes wnl  BUN 6  Cr 0.51  Liver panel wnl    Procal 0.47  Lactate 0.9    WBC 10.3  Hgb 7.7  Plt 425

## 2021-06-14 NOTE — PHARMACY-ADMISSION MEDICATION HISTORY
Admission Medication History Completed by Pharmacy    See [facility] Admission Navigator for allergy information, preferred outpatient pharmacy, prior to admission medications and immunization status.     Medication History Sources:     Patient interview    Care Everywhere    [place]     Changes made to PTA medication list (reason):    Added:   o Everything on this list was added    Deleted: None    Changed: None    Additional Information:    Patient was a good historian of her medications    Prior to Admission medications    Medication Sig Last Dose Taking? Auth Provider   albuterol (PROAIR HFA/PROVENTIL HFA/VENTOLIN HFA) 108 (90 Base) MCG/ACT inhaler Inhale 1-2 puffs into the lungs every 4 hours as needed for shortness of breath / dyspnea or wheezing  Yes Unknown, Entered By History   albuterol (PROVENTIL) (2.5 MG/3ML) 0.083% neb solution Take 2.5 mg by nebulization every 4 hours as needed for shortness of breath / dyspnea or wheezing  Yes Unknown, Entered By History   ALPRAZolam (XANAX) 0.5 MG tablet Take 0.5 mg by mouth 2 times daily as needed for anxiety  Yes Unknown, Entered By History   fluticasone (FLOVENT HFA) 110 MCG/ACT inhaler Inhale 2 puffs into the lungs 2 times daily  Yes Unknown, Entered By History   ibuprofen (ADVIL/MOTRIN) 100 MG/5ML suspension Take 600 mg by mouth every 6 hours as needed for fever or moderate pain  Yes Unknown, Entered By History   levonorgestrel (MIRENA) 20 MCG/24HR IUD 1 each by Intrauterine route once 2019 Yes Unknown, Entered By History   ondansetron (ZOFRAN) 4 MG tablet Take by mouth every 6 hours as needed for nausea  Yes Unknown, Entered By History   oxyCODONE (ROXICODONE) 5 MG/5ML solution Take 5 mg by mouth every 6 hours as needed for severe pain  Yes Unknown, Entered By History   Vitamin D3 (CHOLECALCIFEROL) 25 mcg (1000 units) tablet Take 1 tablet by mouth daily  Yes Unknown, Entered By History       Date completed: 06/14/21    Medication history completed by: Alexey RODRÍGUEZ  [place], [facility]

## 2021-06-14 NOTE — PLAN OF CARE
Patient declined need for pain med.Zofran given with relief to nausea.Tolerated sips water,refused CLD tray.T-fdg initiated without apparent GI upset.Voiding,no stool.Temp variable Continue to monitor.

## 2021-06-14 NOTE — CONSULTS
Interventional Radiology Consult Service Note    Patient is on IR schedule Tuesday 6/15/21 for a CT guided right abdominal drain placement.   Labs WNL for procedure. Covid negative.  Orders for NPO, scrubs and antibiotics have been entered.   Medications to be held include: None  Consent will be done prior to procedure.   Patient is on IV Meropenum.    Alexandra is a 40 year old female with history of gallstone pancreatitis who underwent cholecystectomy and ERCP for choledocholithiasis at OSH. Course complicated by peripancreatic fluid collections necessitating NJ placement. She was transferred to [facility] for concern of infected walled off necrotic collections and further management. Request for IR percutaneous drain placement. She has multiple abdominal abscesses seen on outside CT 6/7. Largest measuring 6.3 x 4.8 cm Series 17 Image 115. Plan for large IR drain for possible endoscopic GI intervention in the future. Primary team to enter diagnostics on fluid.    Please contact the IR charge RN at [phone] for estimated time of procedure.     Case discussed by Dr Turner, Yoana Abbott PA-C and  Dr Aguiar.    Vitals:   BP (!) 140/76 (BP Location: Right arm)   Pulse 110   Temp 96.6  F (35.9  C) (Oral)   Resp 16   Ht 1.829 m (6')   Wt 97.3 kg (214 lb 8.1 oz)   SpO2 94%   BMI 29.09 kg/m      Pertinent Labs:     Lab Results   Component Value Date    WBC 10.3 06/14/2021    WBC 5.1 04/26/2010    WBC 5.1 02/02/2009       Lab Results   Component Value Date    HGB 7.7 06/14/2021    HGB 11.5 04/26/2010    HGB 12.7 04/08/2010       Lab Results   Component Value Date     06/14/2021     04/26/2010     02/02/2009       Lab Results   Component Value Date    INR 1.12 06/14/2021    PTT 49 (H) 06/14/2021       Lab Results   Component Value Date    POTASSIUM 4.1 06/14/2021        Adrianna Abel PA-C  Interventional Radiology  Pager: [phone]

## 2021-06-14 NOTE — CONSULTS
Care Management Initial Consult    General Information  Assessment completed with: Patient  Type of CM/SW Visit: Initial Assessment    Primary Care Provider verified and updated as needed: Yes   Readmission within the last 30 days: previous discharge plan unsuccessful   Return Category: Post-op/Post-procedure complication  Reason for Consult: care coordination/care conference  Advance Care Planning: Advance Care Planning Reviewed: other (comment)(declined ACP resources)          Communication Assessment  Patient's communication style: spoken language (English or Bilingual)    Hearing Difficulty or Deaf: no   Wear Glasses or Blind: no    Cognitive  Cognitive/Neuro/Behavioral: WDL                      Living Environment:   People in home: child(soraya), dependent(Two 16 year old children )     Current living Arrangements: house      Able to return to prior arrangements: yes       Family/Social Support:  Care provided by: Self   Provides care for: child(soraya)             Description of Support System: Family/friends        Current Resources:   Patient receiving home care services: Yes  Skilled Home Care Services: Skilled Nursing  Community Resources: Home Infusion, Home Care(Tube feeding)  Equipment currently used at home: none  Supplies currently used at home: Enteral Nutrition & Supplies    Employment/Financial:  Employment Status: employed full-time        Financial Concerns: No concerns identified   Referral to Financial Counselor: No       Lifestyle & Psychosocial Needs:        Socioeconomic History     Marital status: Single     Spouse name: Not on file     Number of children: Not on file     Years of education: Not on file     Highest education level: Not on file          Functional Status:  Prior to admission patient needed assistance:   Dependent ADLs:: Independent  Dependent IADLs:: Laundry, Cleaning(as needed)       Additional Information:  Per note, Patient is a 40 year old female with history of gallstone  pancreatitis who underwent cholecystectomy and ERCP for choledocholithiasis at OSH. Course complicated by peripancreatic fluid collections necessitating NJ placement. She was transferred to [facility] for concern of infected walled off necrotic collections and further management.  Patient is on IR schedule Tuesday 6/15/21 for a CT guided right abdominal drain placement. Per GI note, may be able to advance diet and remove NJ tube after drainage. On IV abx, follow cultures and plan.    Met with patient to complete initial assessment. Patient lives at home with her 16 year old son and daughter. She is independent and works full time at baseline. Her children assist with laundry and housekeeping as needed. She was recently discharged home from [facility] with NJ tube feeding though [facility]. Skilled [facility] visits were through [facility]. At this time, patient does not anticipate needing skilled nursing visits when she returns home. CM to follow and confirm need for visits prior to discharge. Confirmed services with [facility]. Patient is planning on having friend/family to transport. CM will continue to monitor progression of care, review team recommendations and provide discharge planning assist as needed.       [facility]   [address]  [address], [place], [place] [address]  Phone: [phone]  Fax: [phone]    [facility]  Phone: [phone]  Fax: [phone]      Samantha Saunders RN

## 2021-06-14 NOTE — PROGRESS NOTES
CLINICAL NUTRITION SERVICES - ASSESSMENT NOTE     Nutrition Prescription    RECOMMENDATIONS FOR MDs/PROVIDERS TO ORDER:  - Water flushes per medical team (RD ordered minimal 30 ml q 4 hr to keep NJ patent).     Malnutrition Status:    Severe malnutrition in the context of acute on chronic illness    Recommendations already ordered by Registered Dietitian (RD):  - Will order Osmolite 1.5 Sergei @ goal of  60ml/hr  (1440ml/day)  will provide: 2160 kcals, 90 g PRO, 1097 ml free H20, 293 g CHO, and 0 g fiber daily.  - Start @ 30 ml/hr and increase by 10 ml q 8 hr to goal of 60 ml/hr.   - Add Prosouce packet 4x/d--to provide 160 calories, 44 gm protein.  - TF @ goal + prosource to provide 2320 kcal (29 kcal/kg), 134 gm protein (1.7 gm/kg).  - Water flushes of 30 ml q 4 hr to keep NJ patent.     Future/Additional Recommendations:  - Monitor labs, weights, GI function, further CT re: fluid collections (will increase increased sergei/protein if necrotizing).        REASON FOR ASSESSMENT  Alexandra Nunez is a/an 40 year old female assessed by the dietitian for Malnutrition Screening Tool (wt loss of 34# or >, decreased po intake) and Provider Order - Registered Dietitian to Assess and Order TF per Medical Nutrition Therapy Protocol.      Chart reviewed.  PMH includes gallstone pancreatitis s/p cholecystectomy w/ERCP 4/2021 c/b 5 cm peripancreatic fluid collection abutting the stomach necessitating NJ tube placement for tube feeding, recently admitted to [facility] for persistent abdominal pain with new concern for infected peripancreatic fluid collection. She is transferred here for possible drainage of this fluid collection.    NUTRITION HISTORY  Per last RD note from 6/13, pt was receiving Osmolite 1.5 @ 50 ml/hr with goal rate of 60 ml/hr--to provide 2160 kcals/day (27 kcals/kg), 91 gms protein/day (1.2 gm/kg), 1097 mL free fluid.   Water flushes of 30 ml q 4 hr as was also receiving 2700 ml from IV Rx and MIVF. She was  "also allowed clear liquids and just took sips.  Pt has had nothing but TF and sips of clear liquids since May 2021.  She feels she's been tolerating the TF without issues.  No noted s/sx of malabsorption.     Noted pt had h/o wheat containing products allergy with possible anaphylaxis Sx/throat swelling, bloating, HA in OSH charting.  Clarified with patient.  She notes that she did have allergic reaction to wheat in the past and she avoided for a while but since then she resumed eating wheat with no symptoms.     CURRENT NUTRITION ORDERS  Diet: Clear Liquid  Intake/Tolerance: taking only sips    LABS  Labs reviewed  6/14: BUN 6, Cr 0.51 (possible due to decreased muscle mass)  .    GI 6/14- NJ tip to proximal jejunum per XR.   Noted per 6/4 CT ( in H&P) that there was no evidence of necrotizing pancreatitis noted w/ fluid collection. No noted update yet on any development of necrosis this last CT.    Last BM 6/12 at OSH w/ recently constipated, received enema there.     MEDICATIONS  Medications reviewed    ANTHROPOMETRICS  Height: 182.9 cm (6' 0\")  Most Recent Weight: 97.3 kg (214 lb 8.1 oz)    IBW:72.7 kg (134% IBW)  BMI: Overweight BMI 25-29.9  Weight History: 22# (9%) wt loss since 4/25 (suspect 5/4 wt was off).   Wt Readings from Last 10 Encounters:   06/14/21 97.3 kg (214 lb 8.1 oz)     4/25/2021 108 kg (238 lbs 1.55 oz)   5/4/2021 112.855 kg (248 lbs 12.8 oz)   5/15/2021 99.791 kg (220 lbs)   5/17/2021 96.299 kg (212 lbs 4.8 oz)   5/23/2021 99.791 kg (220 lbs)   6/4/2021 100.699 kg (222 lbs)   6/4/2021 97.098 kg (214 lbs 1 oz)   6/5/2021 93.895 kg (207 lbs)   6/6/2021 96.934 kg (213 lbs 11.2 oz)   6/7/2021 98.158 kg (216 lbs 6.4 oz)     Dosing Weight: 78.8 kg    ASSESSED NUTRITION NEEDS  Estimated Energy Needs: 8903-6076+ kcals/day (25 - 30 kcals/kg)  Justification: Increased needs and Obese (if found to have necrotizing pancreatitis will require up to 35 danilo/kg)  Estimated Protein Needs: 102-154+ grams " protein/day (1.3 - 1.8 grams of pro/kg)   Justification: Hypercatabolism with acute illness, Increased needs and Post-op  Estimated Fluid Needs: 264-2758 mL/day (30 - 35 mL/kg)   Justification: Maintenance and Per provider pending fluid status    PHYSICAL FINDINGS  See malnutrition section below.    MALNUTRITION  % Intake: Unable to assess  % Weight Loss: > 7.5% in 3 months (severe)  Subcutaneous Fat Loss: Facial region:  severe  Muscle Loss: Temporal:  moderate, Facial & jaw region:  moderate, Thoracic region (clavicle):  mild, Dorsal hand:  severe and Posterior calf:  moderate  Fluid Accumulation/Edema: None noted  Malnutrition Diagnosis: Severe malnutrition in the context of acute on chronic illness    NUTRITION DIAGNOSIS  Inadequate oral intake related to pancreatitis as evidenced by need for enteral nutrition to meet 100% of needs.       INTERVENTIONS  Implementation  Nutrition Education: Explained NFPE, plan for TF w/ additional protein  Enteral Nutrition - Initiate     Goals  Total avg nutritional intake to meet a minimum of 25 kcal/kg and 1.3 g PRO/kg daily (per dosing wt 78.8 kg).     Monitoring/Evaluation  Progress toward goals will be monitored and evaluated per protocol.    Ambika Griffiths RD, LD   7B (M-F) Pager: [phone]  RD Weekend Pager: [phone]

## 2021-06-15 ENCOUNTER — APPOINTMENT (OUTPATIENT)
Dept: INTERVENTIONAL RADIOLOGY/VASCULAR | Facility: CLINIC | Age: 41
End: 2021-06-15
Attending: PHYSICIAN ASSISTANT
Payer: COMMERCIAL

## 2021-06-15 LAB
ALBUMIN SERPL-MCNC: 2 G/DL (ref 3.4–5)
ALP SERPL-CCNC: 59 U/L (ref 40–150)
ALT SERPL W P-5'-P-CCNC: 10 U/L (ref 0–50)
ANION GAP SERPL CALCULATED.3IONS-SCNC: 6 MMOL/L (ref 3–14)
APPEARANCE FLD: NORMAL
AST SERPL W P-5'-P-CCNC: 18 U/L (ref 0–45)
BASOPHILS # BLD AUTO: 0.1 10E9/L (ref 0–0.2)
BASOPHILS NFR BLD AUTO: 0.4 %
BILIRUB SERPL-MCNC: 0.6 MG/DL (ref 0.2–1.3)
BUN SERPL-MCNC: 8 MG/DL (ref 7–30)
CALCIUM SERPL-MCNC: 8.4 MG/DL (ref 8.5–10.1)
CHLORIDE SERPL-SCNC: 101 MMOL/L (ref 94–109)
CO2 SERPL-SCNC: 27 MMOL/L (ref 20–32)
COLOR FLD: NORMAL
CREAT SERPL-MCNC: 0.63 MG/DL (ref 0.52–1.04)
DIFFERENTIAL METHOD BLD: ABNORMAL
EOSINOPHIL # BLD AUTO: 0.5 10E9/L (ref 0–0.7)
EOSINOPHIL NFR BLD AUTO: 4.2 %
ERYTHROCYTE [DISTWIDTH] IN BLOOD BY AUTOMATED COUNT: 15.9 % (ref 10–15)
GFR SERPL CREATININE-BSD FRML MDRD: >90 ML/MIN/{1.73_M2}
GLUCOSE SERPL-MCNC: 117 MG/DL (ref 70–99)
GRAM STN SPEC: ABNORMAL
HCT VFR BLD AUTO: 25.3 % (ref 35–47)
HGB BLD-MCNC: 7.6 G/DL (ref 11.7–15.7)
IMM GRANULOCYTES # BLD: 0.2 10E9/L (ref 0–0.4)
IMM GRANULOCYTES NFR BLD: 1.8 %
LACTATE BLD-SCNC: 0.7 MMOL/L (ref 0.7–2)
LIPASE SERPL-CCNC: 69 U/L (ref 73–393)
LYMPHOCYTES # BLD AUTO: 0.7 10E9/L (ref 0.8–5.3)
LYMPHOCYTES NFR BLD AUTO: 6.5 %
MCH RBC QN AUTO: 25.7 PG (ref 26.5–33)
MCHC RBC AUTO-ENTMCNC: 30 G/DL (ref 31.5–36.5)
MCV RBC AUTO: 86 FL (ref 78–100)
MONOCYTES # BLD AUTO: 1 10E9/L (ref 0–1.3)
MONOCYTES NFR BLD AUTO: 9.1 %
NEUTROPHILS # BLD AUTO: 8.9 10E9/L (ref 1.6–8.3)
NEUTROPHILS NFR BLD AUTO: 78 %
NRBC # BLD AUTO: 0 10*3/UL
NRBC BLD AUTO-RTO: 0 /100
PLATELET # BLD AUTO: 345 10E9/L (ref 150–450)
POTASSIUM SERPL-SCNC: 3.9 MMOL/L (ref 3.4–5.3)
PROT SERPL-MCNC: 7.1 G/DL (ref 6.8–8.8)
RBC # BLD AUTO: 2.96 10E12/L (ref 3.8–5.2)
SODIUM SERPL-SCNC: 134 MMOL/L (ref 133–144)
SPECIMEN SOURCE FLD: NORMAL
SPECIMEN SOURCE: ABNORMAL
WBC # BLD AUTO: 11.4 10E9/L (ref 4–11)
WBC # FLD AUTO: NORMAL /UL

## 2021-06-15 PROCEDURE — 250N000009 HC RX 250: Performed by: RADIOLOGY

## 2021-06-15 PROCEDURE — 49406 IMAGE CATH FLUID PERI/RETRO: CPT

## 2021-06-15 PROCEDURE — 99152 MOD SED SAME PHYS/QHP 5/>YRS: CPT | Mod: GC | Performed by: RADIOLOGY

## 2021-06-15 PROCEDURE — 80053 COMPREHEN METABOLIC PANEL: CPT | Performed by: STUDENT IN AN ORGANIZED HEALTH CARE EDUCATION/TRAINING PROGRAM

## 2021-06-15 PROCEDURE — C1769 GUIDE WIRE: HCPCS

## 2021-06-15 PROCEDURE — 36415 COLL VENOUS BLD VENIPUNCTURE: CPT | Performed by: STUDENT IN AN ORGANIZED HEALTH CARE EDUCATION/TRAINING PROGRAM

## 2021-06-15 PROCEDURE — 250N000011 HC RX IP 250 OP 636: Performed by: STUDENT IN AN ORGANIZED HEALTH CARE EDUCATION/TRAINING PROGRAM

## 2021-06-15 PROCEDURE — 250N000013 HC RX MED GY IP 250 OP 250 PS 637: Performed by: STUDENT IN AN ORGANIZED HEALTH CARE EDUCATION/TRAINING PROGRAM

## 2021-06-15 PROCEDURE — 87102 FUNGUS ISOLATION CULTURE: CPT | Performed by: STUDENT IN AN ORGANIZED HEALTH CARE EDUCATION/TRAINING PROGRAM

## 2021-06-15 PROCEDURE — 99233 SBSQ HOSP IP/OBS HIGH 50: CPT | Mod: GC | Performed by: INTERNAL MEDICINE

## 2021-06-15 PROCEDURE — 36415 COLL VENOUS BLD VENIPUNCTURE: CPT | Performed by: INTERNAL MEDICINE

## 2021-06-15 PROCEDURE — 83690 ASSAY OF LIPASE: CPT | Performed by: STUDENT IN AN ORGANIZED HEALTH CARE EDUCATION/TRAINING PROGRAM

## 2021-06-15 PROCEDURE — 85025 COMPLETE CBC W/AUTO DIFF WBC: CPT | Performed by: STUDENT IN AN ORGANIZED HEALTH CARE EDUCATION/TRAINING PROGRAM

## 2021-06-15 PROCEDURE — 272N000500 HC NEEDLE CR2

## 2021-06-15 PROCEDURE — 87186 SC STD MICRODIL/AGAR DIL: CPT | Performed by: STUDENT IN AN ORGANIZED HEALTH CARE EDUCATION/TRAINING PROGRAM

## 2021-06-15 PROCEDURE — 120N000002 HC R&B MED SURG/OB UMMC

## 2021-06-15 PROCEDURE — 258N000003 HC RX IP 258 OP 636: Performed by: STUDENT IN AN ORGANIZED HEALTH CARE EDUCATION/TRAINING PROGRAM

## 2021-06-15 PROCEDURE — 87205 SMEAR GRAM STAIN: CPT | Performed by: STUDENT IN AN ORGANIZED HEALTH CARE EDUCATION/TRAINING PROGRAM

## 2021-06-15 PROCEDURE — 250N000011 HC RX IP 250 OP 636: Performed by: RADIOLOGY

## 2021-06-15 PROCEDURE — 87181 SC STD AGAR DILUTION PER AGT: CPT | Performed by: STUDENT IN AN ORGANIZED HEALTH CARE EDUCATION/TRAINING PROGRAM

## 2021-06-15 PROCEDURE — 87106 FUNGI IDENTIFICATION YEAST: CPT | Performed by: STUDENT IN AN ORGANIZED HEALTH CARE EDUCATION/TRAINING PROGRAM

## 2021-06-15 PROCEDURE — C1729 CATH, DRAINAGE: HCPCS

## 2021-06-15 PROCEDURE — 99233 SBSQ HOSP IP/OBS HIGH 50: CPT | Mod: 25 | Performed by: PHYSICIAN ASSISTANT

## 2021-06-15 PROCEDURE — 272N000504 HC NEEDLE CR4

## 2021-06-15 PROCEDURE — 258N000001 HC RX 258: Performed by: STUDENT IN AN ORGANIZED HEALTH CARE EDUCATION/TRAINING PROGRAM

## 2021-06-15 PROCEDURE — 87075 CULTR BACTERIA EXCEPT BLOOD: CPT | Performed by: STUDENT IN AN ORGANIZED HEALTH CARE EDUCATION/TRAINING PROGRAM

## 2021-06-15 PROCEDURE — 0W9G30Z DRAINAGE OF PERITONEAL CAVITY WITH DRAINAGE DEVICE, PERCUTANEOUS APPROACH: ICD-10-PCS | Performed by: RADIOLOGY

## 2021-06-15 PROCEDURE — 99152 MOD SED SAME PHYS/QHP 5/>YRS: CPT

## 2021-06-15 PROCEDURE — 87077 CULTURE AEROBIC IDENTIFY: CPT | Performed by: STUDENT IN AN ORGANIZED HEALTH CARE EDUCATION/TRAINING PROGRAM

## 2021-06-15 PROCEDURE — 49406 IMAGE CATH FLUID PERI/RETRO: CPT | Mod: GC | Performed by: RADIOLOGY

## 2021-06-15 PROCEDURE — 87070 CULTURE OTHR SPECIMN AEROBIC: CPT | Performed by: STUDENT IN AN ORGANIZED HEALTH CARE EDUCATION/TRAINING PROGRAM

## 2021-06-15 PROCEDURE — 89051 BODY FLUID CELL COUNT: CPT | Performed by: STUDENT IN AN ORGANIZED HEALTH CARE EDUCATION/TRAINING PROGRAM

## 2021-06-15 PROCEDURE — 83605 ASSAY OF LACTIC ACID: CPT | Performed by: INTERNAL MEDICINE

## 2021-06-15 RX ORDER — NALOXONE HYDROCHLORIDE 0.4 MG/ML
0.4 INJECTION, SOLUTION INTRAMUSCULAR; INTRAVENOUS; SUBCUTANEOUS
Status: DISCONTINUED | OUTPATIENT
Start: 2021-06-15 | End: 2021-06-15 | Stop reason: HOSPADM

## 2021-06-15 RX ORDER — PIPERACILLIN SODIUM, TAZOBACTAM SODIUM 4; .5 G/20ML; G/20ML
4.5 INJECTION, POWDER, LYOPHILIZED, FOR SOLUTION INTRAVENOUS EVERY 6 HOURS
Status: DISCONTINUED | OUTPATIENT
Start: 2021-06-15 | End: 2021-06-17

## 2021-06-15 RX ORDER — NALOXONE HYDROCHLORIDE 0.4 MG/ML
0.2 INJECTION, SOLUTION INTRAMUSCULAR; INTRAVENOUS; SUBCUTANEOUS
Status: DISCONTINUED | OUTPATIENT
Start: 2021-06-15 | End: 2021-06-15 | Stop reason: HOSPADM

## 2021-06-15 RX ORDER — LIDOCAINE 40 MG/G
CREAM TOPICAL
Status: DISCONTINUED | OUTPATIENT
Start: 2021-06-15 | End: 2021-06-15 | Stop reason: HOSPADM

## 2021-06-15 RX ORDER — FLUMAZENIL 0.1 MG/ML
0.2 INJECTION, SOLUTION INTRAVENOUS
Status: DISCONTINUED | OUTPATIENT
Start: 2021-06-15 | End: 2021-06-15 | Stop reason: HOSPADM

## 2021-06-15 RX ORDER — LORAZEPAM 2 MG/ML
1 CONCENTRATE ORAL ONCE
Status: DISCONTINUED | OUTPATIENT
Start: 2021-06-15 | End: 2021-06-17

## 2021-06-15 RX ORDER — FENTANYL CITRATE 50 UG/ML
25-50 INJECTION, SOLUTION INTRAMUSCULAR; INTRAVENOUS EVERY 5 MIN PRN
Status: DISCONTINUED | OUTPATIENT
Start: 2021-06-15 | End: 2021-06-15 | Stop reason: HOSPADM

## 2021-06-15 RX ADMIN — ACETAMINOPHEN 650 MG: 325 SOLUTION ORAL at 20:20

## 2021-06-15 RX ADMIN — PIPERACILLIN SODIUM AND TAZOBACTAM SODIUM 4.5 G: 4; .5 INJECTION, POWDER, LYOPHILIZED, FOR SOLUTION INTRAVENOUS at 22:12

## 2021-06-15 RX ADMIN — SODIUM CHLORIDE, POTASSIUM CHLORIDE, SODIUM LACTATE AND CALCIUM CHLORIDE: 600; 310; 30; 20 INJECTION, SOLUTION INTRAVENOUS at 19:09

## 2021-06-15 RX ADMIN — OXYCODONE HYDROCHLORIDE 10 MG: 5 SOLUTION ORAL at 18:12

## 2021-06-15 RX ADMIN — OXYCODONE HYDROCHLORIDE 10 MG: 5 SOLUTION ORAL at 14:03

## 2021-06-15 RX ADMIN — ONDANSETRON 4 MG: 2 INJECTION INTRAMUSCULAR; INTRAVENOUS at 12:39

## 2021-06-15 RX ADMIN — ACETAMINOPHEN 650 MG: 325 SOLUTION ORAL at 05:32

## 2021-06-15 RX ADMIN — FENTANYL CITRATE 50 MCG: 50 INJECTION, SOLUTION INTRAMUSCULAR; INTRAVENOUS at 13:15

## 2021-06-15 RX ADMIN — PROCHLORPERAZINE EDISYLATE 10 MG: 5 INJECTION INTRAMUSCULAR; INTRAVENOUS at 05:24

## 2021-06-15 RX ADMIN — PIPERACILLIN SODIUM AND TAZOBACTAM SODIUM 4.5 G: 4; .5 INJECTION, POWDER, LYOPHILIZED, FOR SOLUTION INTRAVENOUS at 16:08

## 2021-06-15 RX ADMIN — ONDANSETRON 4 MG: 2 INJECTION INTRAMUSCULAR; INTRAVENOUS at 00:33

## 2021-06-15 RX ADMIN — DEXTROSE MONOHYDRATE 1000 ML: 100 INJECTION, SOLUTION INTRAVENOUS at 10:23

## 2021-06-15 RX ADMIN — MIDAZOLAM 1 MG: 1 INJECTION INTRAMUSCULAR; INTRAVENOUS at 13:15

## 2021-06-15 RX ADMIN — MEROPENEM 1 G: 1 INJECTION, POWDER, FOR SOLUTION INTRAVENOUS at 08:10

## 2021-06-15 RX ADMIN — LIDOCAINE HYDROCHLORIDE 10 ML: 10 INJECTION, SOLUTION EPIDURAL; INFILTRATION; INTRACAUDAL; PERINEURAL at 13:18

## 2021-06-15 RX ADMIN — Medication 1 PACKET: at 16:15

## 2021-06-15 RX ADMIN — OXYCODONE HYDROCHLORIDE 10 MG: 5 SOLUTION ORAL at 00:33

## 2021-06-15 RX ADMIN — ACETAMINOPHEN 650 MG: 325 SOLUTION ORAL at 14:03

## 2021-06-15 RX ADMIN — SODIUM CHLORIDE, POTASSIUM CHLORIDE, SODIUM LACTATE AND CALCIUM CHLORIDE: 600; 310; 30; 20 INJECTION, SOLUTION INTRAVENOUS at 08:20

## 2021-06-15 RX ADMIN — OXYCODONE HYDROCHLORIDE 10 MG: 5 SOLUTION ORAL at 22:15

## 2021-06-15 RX ADMIN — OXYCODONE HYDROCHLORIDE 10 MG: 5 SOLUTION ORAL at 05:32

## 2021-06-15 ASSESSMENT — ACTIVITIES OF DAILY LIVING (ADL)
ADLS_ACUITY_SCORE: 16

## 2021-06-15 ASSESSMENT — MIFFLIN-ST. JEOR: SCORE: 1731.81

## 2021-06-15 NOTE — PROGRESS NOTES
Time: 0700-12:30    Admission/Diagnosis:  Pancreatitis [K85.90]    /65 (BP Location: Left arm)   Pulse 86   Temp 98.1  F (36.7  C) (Oral)   Resp 20   Ht 1.829 m (6')   Wt 97.3 kg (214 lb 8.1 oz)   SpO2 90%   BMI 29.09 kg/m      Shift Updates: Pt. is A&O x 4, VSS on room air, up independently. Pain at 5/10; denies nausea this shift. IR planning to drain pancreatic collections, w/potential drain placement today. Triple-lumen Pic, right arm;  Red = 10%dextrose; Gray = LR; White = saline locked.  Pt. Reports anxiety, Adavan order pre-op.    Plan: Pt. Plan is to discharge to home in 4-7 days once tolerating oral meds, oral nutrition and NJ tube is removed.     Continue to monitor and with POC.

## 2021-06-15 NOTE — PLAN OF CARE
Patient returned to floor approx 1400,awake,reports discomfort to drain site,relief with Tylenol/oxycodone.Ambulated to bed upon arrival,gait steady.Resumed t-fdg.Drain output serosanguinous.continue to monitor

## 2021-06-15 NOTE — PROGRESS NOTES
Took over cares of pt @ 2300. Pt is AxO.Tachy but not within notify parameters. Max temp this am 102.8, flagged for sepsis, protocol ordered. Pain managed with Tylenol and Oxy. Nausea intermittently managed with Zofran and Compazine. Pt NPO @ 0000 for IR drain placement today. Pt having increased anxiety, paged out to team to see if there is an option for a med that can go down her NJ tube. Continue to monitor and continue POC.     0650 - Lact back at 0.7, Temp down to 100

## 2021-06-15 NOTE — PROGRESS NOTES
GASTROENTEROLOGY PROGRESS NOTE    Date of Admission: 6/13/2021  Reason for Admission: abdominal pain, fever      Assessment:  40 year old female with a history of gallstone pancreatitis who underwent cholecystectomy (4/28) and ERCP (4/29) for choledocholithiasis at OSH who was transferred for concern of infected walled off necrotic collections.     #. Acute necrotizing pancreatitis with presumed infected walled off necrotic collections  #. Abdominal pain  #. Intermittent nausea and vomiting  Patient initially presented to OSH on 4/25 with acute abdominal pain, nausea and vomiting, lipase >5000, BISAP 1, underwent cholecystectomy as well as ERCP the following day for positive IOC. Uncomplicated hospital stay post tracey but returned in May for nausea, vomiting and abdominal pain, found to have developing acute necrotic collections. NJT placed on 5/18 but patient unable to tolerate much due to N/V. Now admitted with fevers and leukocytosis as well as walled off necrotic collections that are likely source of infection. The largest collection is in the right abdomen but not in a good location for endoscopic transluminal drainage - IR planned percutaneous drainage today (R RP approach)  Etiology: biliary  Date of onset: 4/25/2021  BISAP score on admission: BISAP 1 (SIRS)  Concurrent organ failure: none  Thromboses: none  Diabetes: no  Current nutrition access: NJT  Last imaging: CT scan abd/pelv with IV contrast 6/7  Infection/antiinfectives: Currently Meropenem  Interventions:                    4/28: Lap Cholecystectomy with +IOC                    4/29: ERCP for choledocholithiasis     Recommendations:  IR planning R RP drain placement today  Continue antibiotics, follow cultures (please send drain fluid for cultures, cell count)  OK for diet today, NPO at midnight  Continue NJTF - hopefully will be able to advance diet and remove tube after drain placement  Analgesia/Antiemetics per primary team  Discussed with  primary medicine team       The patient was discussed and plan agreed upon with GI staff, Dr Aguiar.      Yoana Abbott PA-C  Advanced Endoscopy/Pancreaticobiliary GI Service  [facility]  Text Page  _______________________________________________________________      Subjective: Nursing notes and 24hr events reviewed. Patient seen and examined at 0950. Patient reports that she is doing OK today, anxious about drain placement. Febrile again to 102.8F early this morning.    ROS:   4 pt ROS negative unless noted in subjective.     Objective:  Blood pressure 117/65, pulse 86, temperature 98.1  F (36.7  C), temperature source Oral, resp. rate 20, height 1.829 m (6'), weight 96 kg (211 lb 9.6 oz), SpO2 90 %.  Gen: Sitting in bed. Appears comfortable  HEENT: NCAT. Conjunctiva clear. Sclera anicteric  Chest: RRR, non labored breathing  Abd: Soft, tender RUQ, +BS  Msk: no gross deformity  Skin:  no jaundice  Ext: warm, well perfused  Neuro: grossly normal  Mental status/Psych: A&O. Asks/answers questions appropriately     LABS:  BMP  Recent Labs   Lab 06/15/21  0633 06/14/21  0249    133   POTASSIUM 3.9 4.1   CHLORIDE 101 100   MARTIN 8.4* 8.4*   CO2 27 32   BUN 8 6*   CR 0.63 0.51*   * 98     CBC  Recent Labs   Lab 06/15/21  0633 06/14/21  0249   WBC 11.4* 10.3   RBC 2.96* 2.99*   HGB 7.6* 7.7*   HCT 25.3* 25.6*   MCV 86 86   MCH 25.7* 25.8*   MCHC 30.0* 30.1*   RDW 15.9* 15.7*    425     INR  Recent Labs   Lab 06/14/21  0249   INR 1.12     LFTs  Recent Labs   Lab 06/15/21  0633 06/14/21  0249   ALKPHOS 59 61   AST 18 12   ALT 10 14   BILITOTAL 0.6 0.5   PROTTOTAL 7.1 6.9   ALBUMIN 2.0* 2.0*      PANC  Recent Labs   Lab 06/15/21  0633   LIPASE 69*         IMAGING:  Reviewed OSH imaging

## 2021-06-15 NOTE — PRE-PROCEDURE
GENERAL PRE-PROCEDURE:   Procedure:  Abdominal drain placement    Written consent obtained?: Yes    Risks and benefits: Risks, benefits and alternatives were discussed    Consent given by:  Patient  Patient states understanding of procedure being performed: Yes    Patient's understanding of procedure matches consent: Yes    Procedure consent matches procedure scheduled: Yes    Expected level of sedation:  Moderate  Appropriately NPO:  Yes  ASA Class:  Class 3- Severe systemic disease, definite functional limitations  Mallampati  :  Grade 2- soft palate, base of uvula, tonsillar pillars, and portion of posterior pharyngeal wall visible  Lungs:  Lungs clear with good breath sounds bilaterally  Heart:  Normal heart sounds and rate  History & Physical reviewed:  History and physical reviewed and no updates needed  Statement of review:  I have reviewed the lab findings, diagnostic data, medications, and the plan for sedation

## 2021-06-15 NOTE — PROCEDURES
[facility]    Procedure: IR Procedure Note    Date/Time: 6/15/2021 1:41 PM  Performed by: Mynor Sweeney MD  Authorized by: Mynor Sweeney MD     UNIVERSAL PROTOCOL   Site Marked: NA  Prior Images Obtained and Reviewed:  Yes  Required items: Required blood products, implants, devices and special equipment available    Patient identity confirmed:  Verbally with patient, arm band, provided demographic data and hospital-assigned identification number  Patient was reevaluated immediately before administering moderate or deep sedation or anesthesia  Confirmation Checklist:  Patient's identity using two indicators, relevant allergies, procedure was appropriate and matched the consent or emergent situation and correct equipment/implants were available  Time out: Immediately prior to the procedure a time out was called    Universal Protocol: the Joint Commission Universal Protocol was followed    Preparation: Patient was prepped and draped in usual sterile fashion           ANESTHESIA    Anesthesia: Local infiltration  Local Anesthetic:  Lidocaine 1% without epinephrine      SEDATION    Patient Sedated: Yes    Sedation Type:  Moderate (conscious) sedation  Sedation:  Fentanyl and midazolam  Vital signs: Vital signs monitored during sedation    See dictated procedure note for full details.  Findings: 14 Latvian drain placed via retroperitoneal approach into right sided abdominal necrotic fluid collection  30 ml sent for labs    Specimens: none    Complications: None    Condition: Stable    Plan: Flush tid    If output stops: non-con CT abdomen to evaluate for resolution  If there is persistent fluid, drain may need to be replaced/upsized. If fluid is resolved, pt can have sinogram to assess appropriateness of capping vs removal    Pt has IR sinogram ordered in ~ 2 weeks for routine f/u    PROCEDURE   Patient Tolerance:  Patient tolerated the procedure well with no  immediate complications    Length of time physician/provider present for 1:1 monitoring during sedation: 25

## 2021-06-15 NOTE — PROGRESS NOTES
Antimicrobial Stewardship Team Note    Antimicrobial Stewardship Program - A joint venture between [facility] and  Physicians to optimize antibiotic management.  NOT a formal consult - Restricted Antimicrobial Review     Patient: Alexandra Nunez  MRN: [id number]  Allergies: Nkda [no known drug allergies]    Brief Summary: Alexandra Nunez is a 40-year-old woman with a complicated history of gallstone pancreatitis s/p cholecystectomy w/ERCP 4/2021 c/b 5 cm peripancreatic fluid collection abutting the stomach (noted 5/2021) necessitating NJ tube placement for tube feeding, blocked NJ tube on 6/4 with subsequent removal and trial of clear liquid diet, who was admitted to [facility] on 6/5 for persistent abdominal pain with nausea and vomiting.    HPI: Patient presented to the [place] ED with a chief complaint of ongoing abdominal pain following NJ tube removal, inability to tolerate clear liquid diet and a reported fever of 102F. In the ED she remained mildly febrile (100.1F) though labs were unremarkable except for a slightly elevated CRP (16). Patient underwent EGD with new NJ tube placement on 6/6 and resumed tube feedings. Patient remained relatively stable until 6/9 when she developed a fever (Tmax 104F), tachycardia (120s) and hypoxia requiring 2L of oxygen. Empiric Meropenem and Vancomycin were started for sepsis. CT of abdomen/pelvis on 6/4 noted an unchanged 5 cm subacute peripancreatic fluid collection from those noted in April 2021. Repeat CT abdomen/pelvis on 6/7 however demonstrated multiple rim enhancing fluid collections with the largest in the mid abdomen measuring 6.3 cm x 4.8 cm and 2 small foci adjacent to the pancreas suspected to be peripancreatic fluid collections, though no evidence of necrotizing pancreatitis was noted. She was ultimately transferred to [facility] on 6/13 for further management due to ongoing sepsis symptoms and concern of infected peripancreatic fluid  collection that may require drainage.    Since arrival, patient has been intermittently febrile (Tmax 102.8F), tachycardic, with mild leukocytosis (11.4) and elevated CRP (120) though patient is saturating well on room air. She was transferred only on Meropenem which has been continued with today being day 7 of antibiotics. Multiple blood cultures at [facility] had no growth, though 1 of 2 on 6/9 did have strep anginosis. Repeat blood cultures collected here on 6/14 have no growth. Patient is scheduled to have a drain placed by IR today (6/15) with subsequent fluid collection for cultures.          Active Anti-infective Medications   (From admission, onward)                 Start     Stop    06/14/21 0400  meropenem  1 g,   Intravenous,   EVERY 8 HOURS     infected pancreatic fluid collection        --                  Assessment:   Sepsis secondary to peripancreatic abscess - Patient has been intermittently febrile, tachycardic with mild leukocytosis consistent with sepsis. Radiographic imaging demonstrates peripancreatic fluid collections that is likely source of ongoing infection and sepsis picture. Source control has not been achieved yet though abdominal drain is being placed so antibiotics are reasonable to continue until source control has been achieved.  However, patient does not have a history of ESBL infection and is a community dwelling individual not at high risk for ESBL infection so does not warrant ongoing coverage with meropenem as it would be prudent to reserve this in case patient decompensates or ESBL is noted on cultures. Continued coverage with Zosyn would provide adequate coverage, including possible GI anaerobes, with further narrowing occurring pending results of drainage and fluid collection.     Recommendations:  Recommend narrowing Meropenem to Zosyn 4.5 g q6h    Pharmacy took the following actions: electronic note and paged medical team.    Discussed with ID Staff: Adam Duff  MD; Brenna Conrad, PharmD, BCIDP  Mattie Del Valle, PharmD  PGY1 Pharmacy Resident       Vital Signs/Clinical Features:  Vitals         06/13 0700  -  06/14 0659 06/14 0700  -  06/15 0659 06/15 0700  -  06/15 1219   Most Recent    Temp ( F) 97.4 -  101.2    96.6 -  102.8      98.1     98.1 (36.7)    Pulse 109 -  129    104 -  125      86     86    Resp 16 -  18    16 -  18      20     20    /78 -  156/90    119/74 -  140/76    112/72 -  117/65     117/65    SpO2 (%) 93 -  94    87 -  98    90 -  92     90            Labs  Estimated Creatinine Clearance: 151.1 mL/min (based on SCr of 0.63 mg/dL).  Recent Labs   Lab Test 06/14/21  0249 06/15/21  0633   CR 0.51* 0.63       Recent Labs   Lab Test 06/14/21  0249 06/15/21  0633   WBC 10.3 11.4*   ANEU 7.5 8.9*   ALYM 1.2 0.7*   ADRIENNE 0.9 1.0   AEOS 0.5 0.5   HGB 7.7* 7.6*   HCT 25.6* 25.3*   MCV 86 86    345       Recent Labs   Lab Test 06/14/21  0249 06/15/21  0633   BILITOTAL 0.5 0.6   ALKPHOS 61 59   ALBUMIN 2.0* 2.0*   AST 12 18   ALT 14 10       Recent Labs   Lab Test 06/14/21 0249 06/14/21  0647   PCAL  --  0.47   .0*  --      Culture Results:  7-Day Micro Results       Procedure Component Value Units Date/Time    Cell count with differential fluid     Order Status: No result Lab Status: No result     Specimen: Cyst Fluid     Fluid Culture Aerobic Bacterial     Order Status: No result Lab Status: No result     Specimen: Pancreatic fluid     Gram stain     Order Status: No result Lab Status: No result     Specimen: Pancreatic fluid     Anaerobic bacterial culture     Order Status: No result Lab Status: No result     Specimen: Pancreatic fluid     Fungus Culture, non-blood     Order Status: No result Lab Status: No result     Specimen: Pancreatic fluid     Blood culture [[id number]] Collected: 06/14/21 0646    Order Status: Completed Lab Status: Preliminary result Updated: 06/15/21 0037    Specimen: Blood      Specimen Description Blood Red port      Culture Micro No growth after 16 hours    Blood culture [[id number]] Collected: 06/14/21 0646    Order Status: Completed Lab Status: Preliminary result Updated: 06/15/21 0037    Specimen: Blood      Specimen Description Blood Right Hand     Culture Micro No growth after 16 hours    Blood culture     Order Status: Canceled Lab Status: No result     Specimen: Blood             Imaging: Xr Chest Port 1 View    Result Date: 6/14/2021  EXAM: XR CHEST PORT 1 VW  6/14/2021 12:07 PM  HISTORY: Picc placement . COMPARISON: Same day radiograph FINDINGS: RPO radiograph of the chest. Right upper extremity PICC tip projects over the right atrium. Partially visualized feeding tube coursing into the duodenum and out of the field-of-view. Left chest ICD with leads projecting over the right atrium and right ventricle (RA lead fractured) . Right upper quadrant surgical clips. Trachea is clear. Cardiac mediastinal silhouette is within normal limits. Pulmonary vasculature is distinct. No appreciable pneumothorax. Blunting of the bilateral costophrenic angles. Bibasilar atelectasis. The upper abdomen is unremarkable.     IMPRESSION: Right upper extremity PICC tip projects over the right atrium. Right atrial pacer lead is fractured, unchanged.  I have personally reviewed the examination and initial interpretation and I agree with the findings. TOMASA ADAIR MD    Xr Chest Port 1 View    Result Date: 6/14/2021  Exam: XR CHEST PORT 1 VIEW, 6/14/2021 1:37 PM Indication: RPO view to verify PICC placement after it was pulled back Comparison: Earlier today Findings: Pacemaker again seen on the left chest. The atrial lead is fractured. Ventricular lead intact. Feeding tube seen coursing through the mediastinum. Tip projects off the film. PICC tip in the low superior vena cava, retracted from prior. Heart is within normal limits. Small bilateral pleural effusions with associated atelectasis. Pulmonary vasculature within normal limits.      Impression: 1. PICC tip now in the low superior vena cava. 2. Bilateral pleural effusions with associated atelectasis are stable. 3. Again noted fractured pacemaker lead. XAVI STERN MD    Xr Chest Port 1 View    Result Date: 6/14/2021  EXAM: XR CHEST PORT 1 VIEW  6/14/2021 1:37 AM  HISTORY: picc verification COMPARISON: Same-day radiograph of the abdomen TECHNIQUE: AP chest radiograph FINDINGS: Feeding tube passes inferiorly field-of-view. Left chest pacemaker with leads projecting over the heart. Right chest PICC with tip projecting over the mid right atrium. Cardiac silhouette is within normal limits. Small left pleural effusion with overlying, retrocardiac opacities. Silhouetting of the left hemidiaphragm. Hazy right lung base. No pneumothorax.     IMPRESSION: 1. PICC tip projects over the mid right atrium. 2. Left lower lobe atelectasis with small effusion. 3. Likely small right pleural effusion with associated atelectasis. I have personally reviewed the examination and initial interpretation and I agree with the findings. XAVI STERN MD    Xr Abdomen Port 1 View    Result Date: 6/14/2021  EXAMINATION:  XR ABDOMEN PORT 1 VIEWS 6/14/2021 1:38 AM COMPARISON: Same-day radiograph of the chest HISTORY: NJ verification TECHNIQUE: Portable supine radiograph of the abdomen FINDINGS: Feeding tube tip projects over the proximal jejunum. Cholecystectomy surgical clips. No dilated loops of bowel. No pneumatosis.     IMPRESSION: Feeding tube tip within the proximal jejunum. Nonobstructive bowel gas pattern. I have personally reviewed the examination and initial interpretation and I agree with the findings. XAVI STERN MD

## 2021-06-15 NOTE — PLAN OF CARE
/78 (BP Location: Left arm)   Pulse 104   Temp 97.4  F (36.3  C) (Oral)   Resp 16   Ht 1.829 m (6')   Wt 97.3 kg (214 lb 8.1 oz)   SpO2 98%   BMI 29.09 kg/m      Neuro: A&Ox4, cooperative  Cardiac: tachy but WDL, denies chest pain, Tmax of 101   Respiratory: on RA, denies SOB  GI/: voiding adequately, LBM 3/14  Skin: old abd lap sites CDI  Pain: PRN oxy given x1  LDA: PICC triple lumen, NJ running TF @ 30mL  Activity: SBA/up ad shemar  Diet/Appetite: clear liquids, TF running 30mL (advance by 10mL q8h)  Plan: NPO @ midnight for IR tomorrow, advance TF to 40mL next time it is running again, continue POC

## 2021-06-15 NOTE — PROGRESS NOTES
[facility], [place]     Daily Progress Note - [facility] Service        Date of service: 06/14/21      Assessment & Plan  Alexandra Nunez is a 40-year-old woman with history of gallstone pancreatitis s/p cholecystectomy w/ ERCP 4/2021 c/b peripancreatic fluid collection abutting the stomach necessitating NJ tube placement, who was recently admitted to [facility] for persistent abdominal pain with new concern for infected peripancreatic fluid collection. She is transferred here for further management.    Sepsis 2/2 acute infected peripancreatic fluid collections  Hx gallstone pancreatitis s/p cholecystectomy & ERCP (4/2021)  Initially presented with gallstone pancreatitis in 4/2021, underwent lap tracey on 4/28 and ERCP on 4/29. Recovery c/b development of acute enrrique-pancreatic fluid collections and inability to tolerate PO intake and required NJT placement 5/18 with tube feeds at home. Now admitted with increased abdominal pain and fevers concerning for infected fluid collection.  - GI panc/bili following, appreciate recs  - IR consult for abdominal drain placement today  - 1-time ativan pre-procedure  - Cultures ordered for collection during procedure  - Narrow meropenem to zosyn  - Pain control: tylenol prn, liquid oxycodone prn -- currently well-controlled with this, could add IV dilaudid if worsens  - Anti-emetics prn  - Continue LR at 100 ml/hr    Severe malnutrition in the context of acute on chronic illness  Based on: weight loss, moderate/severe subcutaneous fat loss, moderate/severe muscle loss. NJ in place due to inability to tolerate PO.  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   Anxiety: Was intermittently taking xanax at home. Monitor symptoms here.  Mild intermittent asthma: Albuterol  prn.      Diet: NPO until IR procedure, TF per dietician  DVT Prophylaxis: SubQ heparin (held AM dose before procedure)  Code Status: Full code    Disposition:  Expected discharge: 4 - 7 days, recommended to prior living arrangement once adequate pain management/ tolerating PO medications, antibiotic plan established and pancreatic fluid collections managed.      Patient staffed with attending physician, Dr. Long.    Amy Glaser MD  Internal Medicine, PGY-2  [facility] Service  Pager: [phone]  [facility]   Please see sign in/sign out for up to date coverage information  _____________________________________________________________________    Interval History  Nursing notes reviewed. Patient febrile to 102.8 F overnight. She felt warm, clammy, and sick when she had the fever. Feeling much better this morning. Pain is reasonably controlled with the oral oxycodone, patient tries to avoid taking this as much as possible. Needing antiemetics for nausea, but they are keeping it controlled as well. Nervous about the procedure. No other new concerns.    4-point ROS negative unless mentioned above.    Physical Exam  /72 (BP Location: Left arm)   Pulse 86   Temp 98.1  F (36.7  C) (Oral)   Resp 20   Ht 1.829 m (6')   Wt 97.3 kg (214 lb 8.1 oz)   SpO2 92%   BMI 29.09 kg/m    214 lbs 8.12 oz    Constitutional: awake, alert, cooperative, no acute distress  Respiratory: breathing comfortably, clear to auscultation bilaterally, no crackles or wheezing  Cardiovascular: regular rate and rhythm, normal S1 and S2, no murmur noted  GI: soft, non-distended, mildly tender across epigastric area, NJ in place  Skin: normal skin color, texture, turgor  Musculoskeletal: no lower extremity edema present  Neurologic: alert and oriented x3, CNs II-XII grossly intact, moving all extremities equally      Labs and Imaging  All labs and imaging reviewed. Pertinent below.    Malcom wnl  Cr  0.6    WBC 11.4  Hgb 7.6  Plt 345

## 2021-06-16 LAB
ALBUMIN SERPL-MCNC: 1.9 G/DL (ref 3.4–5)
ALP SERPL-CCNC: 66 U/L (ref 40–150)
ALT SERPL W P-5'-P-CCNC: 11 U/L (ref 0–50)
ANION GAP SERPL CALCULATED.3IONS-SCNC: 6 MMOL/L (ref 3–14)
AST SERPL W P-5'-P-CCNC: 15 U/L (ref 0–45)
BASOPHILS # BLD AUTO: 0 10E9/L (ref 0–0.2)
BASOPHILS NFR BLD AUTO: 0.4 %
BILIRUB SERPL-MCNC: 0.5 MG/DL (ref 0.2–1.3)
BUN SERPL-MCNC: 8 MG/DL (ref 7–30)
C DIFF TOX B STL QL: NEGATIVE
CALCIUM SERPL-MCNC: 8 MG/DL (ref 8.5–10.1)
CHLORIDE SERPL-SCNC: 98 MMOL/L (ref 94–109)
CO2 SERPL-SCNC: 28 MMOL/L (ref 20–32)
CREAT SERPL-MCNC: 0.49 MG/DL (ref 0.52–1.04)
DIFFERENTIAL METHOD BLD: ABNORMAL
EOSINOPHIL # BLD AUTO: 0.7 10E9/L (ref 0–0.7)
EOSINOPHIL NFR BLD AUTO: 6.4 %
ERYTHROCYTE [DISTWIDTH] IN BLOOD BY AUTOMATED COUNT: 15.8 % (ref 10–15)
GFR SERPL CREATININE-BSD FRML MDRD: >90 ML/MIN/{1.73_M2}
GLUCOSE SERPL-MCNC: 119 MG/DL (ref 70–99)
HCT VFR BLD AUTO: 24.4 % (ref 35–47)
HGB BLD-MCNC: 7.3 G/DL (ref 11.7–15.7)
IMM GRANULOCYTES # BLD: 0.1 10E9/L (ref 0–0.4)
IMM GRANULOCYTES NFR BLD: 1.2 %
LACTATE BLD-SCNC: 1.4 MMOL/L (ref 0.7–2)
LYMPHOCYTES # BLD AUTO: 0.8 10E9/L (ref 0.8–5.3)
LYMPHOCYTES NFR BLD AUTO: 7.6 %
MCH RBC QN AUTO: 25.7 PG (ref 26.5–33)
MCHC RBC AUTO-ENTMCNC: 29.9 G/DL (ref 31.5–36.5)
MCV RBC AUTO: 86 FL (ref 78–100)
MONOCYTES # BLD AUTO: 0.9 10E9/L (ref 0–1.3)
MONOCYTES NFR BLD AUTO: 8.3 %
NEUTROPHILS # BLD AUTO: 8 10E9/L (ref 1.6–8.3)
NEUTROPHILS NFR BLD AUTO: 76.1 %
NRBC # BLD AUTO: 0 10*3/UL
NRBC BLD AUTO-RTO: 0 /100
PLATELET # BLD AUTO: 417 10E9/L (ref 150–450)
POTASSIUM SERPL-SCNC: 3.7 MMOL/L (ref 3.4–5.3)
PROT SERPL-MCNC: 7 G/DL (ref 6.8–8.8)
RBC # BLD AUTO: 2.84 10E12/L (ref 3.8–5.2)
SODIUM SERPL-SCNC: 132 MMOL/L (ref 133–144)
SPECIMEN SOURCE: NORMAL
WBC # BLD AUTO: 10.6 10E9/L (ref 4–11)

## 2021-06-16 PROCEDURE — 999N000044 HC STATISTIC CVC DRESSING CHANGE

## 2021-06-16 PROCEDURE — 85025 COMPLETE CBC W/AUTO DIFF WBC: CPT | Performed by: STUDENT IN AN ORGANIZED HEALTH CARE EDUCATION/TRAINING PROGRAM

## 2021-06-16 PROCEDURE — 120N000002 HC R&B MED SURG/OB UMMC

## 2021-06-16 PROCEDURE — 99233 SBSQ HOSP IP/OBS HIGH 50: CPT | Performed by: PHYSICIAN ASSISTANT

## 2021-06-16 PROCEDURE — 80053 COMPREHEN METABOLIC PANEL: CPT | Performed by: STUDENT IN AN ORGANIZED HEALTH CARE EDUCATION/TRAINING PROGRAM

## 2021-06-16 PROCEDURE — 250N000011 HC RX IP 250 OP 636: Performed by: STUDENT IN AN ORGANIZED HEALTH CARE EDUCATION/TRAINING PROGRAM

## 2021-06-16 PROCEDURE — 250N000013 HC RX MED GY IP 250 OP 250 PS 637: Performed by: STUDENT IN AN ORGANIZED HEALTH CARE EDUCATION/TRAINING PROGRAM

## 2021-06-16 PROCEDURE — 87493 C DIFF AMPLIFIED PROBE: CPT | Performed by: STUDENT IN AN ORGANIZED HEALTH CARE EDUCATION/TRAINING PROGRAM

## 2021-06-16 PROCEDURE — 99233 SBSQ HOSP IP/OBS HIGH 50: CPT | Mod: GC | Performed by: INTERNAL MEDICINE

## 2021-06-16 PROCEDURE — 36592 COLLECT BLOOD FROM PICC: CPT | Performed by: STUDENT IN AN ORGANIZED HEALTH CARE EDUCATION/TRAINING PROGRAM

## 2021-06-16 PROCEDURE — 36592 COLLECT BLOOD FROM PICC: CPT | Performed by: INTERNAL MEDICINE

## 2021-06-16 PROCEDURE — 83605 ASSAY OF LACTIC ACID: CPT | Performed by: INTERNAL MEDICINE

## 2021-06-16 PROCEDURE — 258N000003 HC RX IP 258 OP 636: Performed by: STUDENT IN AN ORGANIZED HEALTH CARE EDUCATION/TRAINING PROGRAM

## 2021-06-16 RX ORDER — AMINO AC/PROTEIN HYDR/WHEY PRO 10G-100/30
2 LIQUID (ML) ORAL 3 TIMES DAILY
Status: DISCONTINUED | OUTPATIENT
Start: 2021-06-17 | End: 2021-06-21

## 2021-06-16 RX ADMIN — PIPERACILLIN SODIUM AND TAZOBACTAM SODIUM 4.5 G: 4; .5 INJECTION, POWDER, LYOPHILIZED, FOR SOLUTION INTRAVENOUS at 22:13

## 2021-06-16 RX ADMIN — HEPARIN SODIUM 5000 UNITS: 5000 INJECTION, SOLUTION INTRAVENOUS; SUBCUTANEOUS at 07:56

## 2021-06-16 RX ADMIN — SODIUM CHLORIDE, POTASSIUM CHLORIDE, SODIUM LACTATE AND CALCIUM CHLORIDE: 600; 310; 30; 20 INJECTION, SOLUTION INTRAVENOUS at 05:21

## 2021-06-16 RX ADMIN — ONDANSETRON 4 MG: 2 INJECTION INTRAMUSCULAR; INTRAVENOUS at 03:39

## 2021-06-16 RX ADMIN — ACETAMINOPHEN 650 MG: 325 SOLUTION ORAL at 21:22

## 2021-06-16 RX ADMIN — Medication 1 PACKET: at 08:03

## 2021-06-16 RX ADMIN — PROCHLORPERAZINE EDISYLATE 10 MG: 5 INJECTION INTRAMUSCULAR; INTRAVENOUS at 05:31

## 2021-06-16 RX ADMIN — OXYCODONE HYDROCHLORIDE 10 MG: 5 SOLUTION ORAL at 03:39

## 2021-06-16 RX ADMIN — ONDANSETRON 4 MG: 2 INJECTION INTRAMUSCULAR; INTRAVENOUS at 09:15

## 2021-06-16 RX ADMIN — HEPARIN SODIUM 5000 UNITS: 5000 INJECTION, SOLUTION INTRAVENOUS; SUBCUTANEOUS at 19:48

## 2021-06-16 RX ADMIN — ACETAMINOPHEN 650 MG: 325 SOLUTION ORAL at 10:49

## 2021-06-16 RX ADMIN — ACETAMINOPHEN 650 MG: 325 SOLUTION ORAL at 16:14

## 2021-06-16 RX ADMIN — PIPERACILLIN SODIUM AND TAZOBACTAM SODIUM 4.5 G: 4; .5 INJECTION, POWDER, LYOPHILIZED, FOR SOLUTION INTRAVENOUS at 03:46

## 2021-06-16 RX ADMIN — ACETAMINOPHEN 650 MG: 325 SOLUTION ORAL at 00:44

## 2021-06-16 RX ADMIN — PIPERACILLIN SODIUM AND TAZOBACTAM SODIUM 4.5 G: 4; .5 INJECTION, POWDER, LYOPHILIZED, FOR SOLUTION INTRAVENOUS at 16:14

## 2021-06-16 RX ADMIN — ACETAMINOPHEN 650 MG: 325 SOLUTION ORAL at 05:10

## 2021-06-16 RX ADMIN — PIPERACILLIN SODIUM AND TAZOBACTAM SODIUM 4.5 G: 4; .5 INJECTION, POWDER, LYOPHILIZED, FOR SOLUTION INTRAVENOUS at 09:14

## 2021-06-16 RX ADMIN — ONDANSETRON 4 MG: 2 INJECTION INTRAMUSCULAR; INTRAVENOUS at 20:29

## 2021-06-16 ASSESSMENT — ACTIVITIES OF DAILY LIVING (ADL)
ADLS_ACUITY_SCORE: 16

## 2021-06-16 ASSESSMENT — MIFFLIN-ST. JEOR: SCORE: 1744.51

## 2021-06-16 NOTE — PLAN OF CARE
Problem: Adult Inpatient Plan of Care  Goal: Plan of Care Review  Outcome: No Change  Goal: Patient-Specific Goal (Individualized)  Outcome: No Change     Problem: Pain Acute  Goal: Acceptable Pain Control and Functional Ability  Outcome: No Change  Intervention: Develop Pain Management Plan  Recent Flowsheet Documentation  Taken 6/16/2021 0100 by Ashley Magaña RN  Pain Management Interventions: medication (see MAR)     Problem: Adult Inpatient Plan of Care  Goal: Absence of Hospital-Acquired Illness or Injury  Intervention: Identify and Manage Fall Risk  Recent Flowsheet Documentation  Taken 6/16/2021 0100 by Ashley Magaña RN  Safety Promotion/Fall Prevention:   activity supervised   assistive device/personal items within reach  Intervention: Prevent Skin Injury  Recent Flowsheet Documentation  Taken 6/16/2021 0100 by Ashley Magaña RN  Body Position: position changed independently

## 2021-06-16 NOTE — PROGRESS NOTES
[facility], [place]     Daily Progress Note - [facility] Service        Date of service: 06/14/21      Assessment & Plan  Alexandra Nunez is a 40-year-old woman with history of gallstone pancreatitis s/p cholecystectomy w/ ERCP 4/2021 c/b peripancreatic fluid collection abutting the stomach necessitating NJ tube placement, who was recently admitted to [facility] for persistent abdominal pain with new concern for infected peripancreatic fluid collection. She is transferred here for further management.    Sepsis 2/2 acute necrotizing pancreatitis with infected walled off necrotic collections  S/p RP drain placement (6/15)  Hx gallstone pancreatitis s/p cholecystectomy & ERCP (4/2021)  Initially presented with gallstone pancreatitis in 4/2021, underwent lap tracey on 4/28 and ERCP on 4/29. Recovery c/b development of acute enrrique-pancreatic fluid collections and inability to tolerate PO intake and required NJT placement 5/18 with tube feeds at home. Admitted with increased abdominal pain and fevers now s/p RP drain placed into necrotic peripancreatic fluid collection.  - GI panc/bili following, appreciate recs  - Follow cultures from abscess  - Continue zosyn pending culture date  - Pain control: tylenol prn, liquid oxycodone prn  - Anti-emetics prn  - Discontinue mIVF now that not NPO and TF at goal  - ADAT, will assess for ability to remove NJ prior to discharge    Severe malnutrition in the context of acute on chronic illness  Based on: weight loss, moderate/severe subcutaneous fat loss, moderate/severe muscle loss. NJ in place due to inability to tolerate PO.  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    Anxiety: Was intermittently taking xanax at home. Monitor symptoms here.  Mild intermittent asthma: Albuterol prn.      Diet: TF, trial advancing diet  DVT Prophylaxis: SubQ heparin  Code Status: Full code    Disposition:  Expected discharge: 4 - 7 days, recommended to prior living arrangement once adequate pain management/ tolerating PO medications, antibiotic plan established and nutrition plan in place.      Patient staffed with attending physician, Dr. Long.    Amy Glaser MD  Internal Medicine, PGY-2  [facility] Service  Pager: [phone]  [facility]   Please see sign in/sign out for up to date coverage information  _____________________________________________________________________    Interval History  Nursing notes reviewed. Drain placed in IR yesterday, returned purulent and necrotic fluid. Febrile again overnight, but fever curve is overall trending down.  Patient is feeling better this morning.  Had some discomfort at the site of the drain insertion, but otherwise feels like the pain is somewhat improved.  Did not feel sick when she had her temperature overnight.  Has been taking sips of apple juice this morning and so far tolerating well.    4-point ROS negative unless mentioned above.    Physical Exam  BP (!) 102/39 (BP Location: Left arm)   Pulse 103   Temp 99.6  F (37.6  C)   Resp 14   Ht 1.829 m (6')   Wt 96 kg (211 lb 9.6 oz)   SpO2 93%   BMI 28.70 kg/m    211 lbs 9.6 oz    Constitutional: awake, alert, cooperative, no acute distress  Respiratory: breathing comfortably on room air  Cardiovascular: tachycardic, regular  GI: soft, non-distended, NJ in place, RP drain in place with red serosanguinous output  Skin: normal skin color, texture, turgor  Musculoskeletal: no lower extremity edema present  Neurologic: alert and oriented x3, CNs II-XII grossly intact, moving all extremities equally      Labs and Imaging  All labs and imaging reviewed.  Pertinent below.    Cr 0.49    WBC 10.6  Hgb 7.3  Plt  417    Pancreatic abscess fluid:  21,300 WBCs  Gram positive cocci & gram negative coccobacilli on Gram stain

## 2021-06-16 NOTE — PLAN OF CARE
BP (!) 102/39 (BP Location: Left arm)   Pulse 103   Temp 96.9  F (36.1  C) (Axillary)   Resp 14   Ht 1.829 m (6')   Wt 96 kg (211 lb 9.6 oz)   SpO2 93%   BMI 28.70 kg/m       Neuro: AOx4, pleasant's, slow to respond at times  Cardiac: Denies chest pain, has a permanent pacemaker  Respiratory: Denies SOB, on RA  GI/: voiding adequately, BM this shift (incontinent by accident)  Diet/Appetite: TF infusing at 60 mL/hr goal rate. Zofran/Compazine given for nausea   Skin: No new deficits  LDA: triple lumen PICC. LR infusing at 100 mL/hr with intermittent abx. R flank drain with irrigation every shift per orders  Activity: SBA  Pain: Right abdomen drain site managed with Tylenol/Oxycodone  Plan: Continue POC, GI pan/bili following

## 2021-06-16 NOTE — PROGRESS NOTES
CLINICAL NUTRITION SERVICES - BRIEF NOTE     Nutrition Prescription    RECOMMENDATIONS FOR MDs/PROVIDERS TO ORDER:  - Recommend continuing some TF (?noc) with high needs of necrotizing pancreatitis and past significant wt loss since starting TF.   - Water flushes beyond current 30 ml q 4 hr per MD discretion.  May need increased flushes if unable to take in 1 L oral fluids.    Recommendations already ordered by Registered Dietitian (RD):  - Increase Osmolite 1.5 to 65 ml/hr (1560 ml) and increase Prosource to 6 packets/day to provide : 2580 kcals (33 kcal/kg), 163 g PRO (2.1 gm pro/kg) , 1188 ml free H20, 317 g CHO, and 0 g fiber daily.  - Continue Free water of 30 ml q 4 hr to keep FT patent.     Future/Additional Recommendations:  - Begin calorie counts once diet advances beyond clear liquids  - See pt when available re: increased estimated needs w/ necrotizing pancreatitis.   - Monitor for potential need for semi-elemental formula, PERT.      Pt unavailable went attempted to see x 2 today.  Noted possible plan for diet advance and discontinuing TF if pt tolerates per GI MD note.  Currently no order for ADAT.     EVALUATION OF THE PROGRESS TOWARD GOALS   Diet: clear liquids  Nutrition Support: via NJ (placed 5/18) Osmolite 1.5 Sergei @ goal of  60ml/hr  (1440ml/day) + 4 packets Prosource will provide: 2320 kcals (29 kcal/kg), 135 g PRO (1.7 gm Pro/kg), 1097 ml free H20, 293 g CHO, and 0 g fiber daily.      NEW FINDINGS   Noted 14 Fr Skater drain placed into fluid collection 6/15 w/ aspiration of 30 ml of purulent/necrotic material.    Labs: Na 132.     GI: Last BM: loose BM 6/16 (2 total stools).  None recorded 6/15.     Rx: IV zosyn.    Dosing Weight: 78.8 kg       ASSESSED NUTRITION NEEDS--adjusted for necrotizing pancreatitis  Estimated Energy Needs: 3640-3223 kcals/day (30- 35 kcals/kg)   Justification: Increased needs w/ necrotizing pancreatitis and Obese   Estimated Protein Needs: 158-197 grams protein/day  (2.0-2.5 grams of pro/kg)   Justification: Hypercatabolism with acute illness, necrotizing pancreatisis   Estimated Fluid Needs: 264-2758 mL/day (30 - 35 mL/kg)   Justification: Maintenance w/ high protein load and Per provider pending fluid status      INTERVENTIONS  Enteral nutrition--adjust rate, modulars.    Monitoring/Evaluation  Progress toward goals will be monitored and evaluated per protocol.     Ambika Griffiths RD, LD   7B (M-F) Pager: [phone]  RD Weekend Pager: [phone]

## 2021-06-16 NOTE — PLAN OF CARE
BP (!) 151/80 (BP Location: Left arm)   Pulse 123   Temp 100.8  F (38.2  C) (Axillary)   Resp 20   Ht 1.829 m (6')   Wt 96 kg (211 lb 9.6 oz)   SpO2 92%   BMI 28.70 kg/m      Pt had IR today for R skater flank drain placement   Neuro: A&Ox4, cooperative, pt sleepy d/t iR today  Cardiac: Tmax of 100.8 PRN tylenol given x1, tchy - within parameters, denies chest pain  Respiratory: pt currently on 1L NC - satting 89-92%  GI/: pt voiding adequately, no BM this shift  Skin: old lap sites   Pain: PRN oxy given  LDA: PICC triple lumen, LR running @ 100, abx with TKO, R flnk drain irrigated per orders  Activity: SBA  Diet/Appetite: TF running @ 50mL/hr (advance 10mL q8h, advance next @0600), pt has been having ice chips  Plan: continue POC

## 2021-06-16 NOTE — PROGRESS NOTES
GASTROENTEROLOGY PROGRESS NOTE    Date of Admission: 6/13/2021  Reason for Admission: abdominal pain, fever      Assessment:  40 year old female with a history of gallstone pancreatitis who underwent cholecystectomy (4/28) and ERCP (4/29) for choledocholithiasis at OSH who was transferred for concern of infected walled off necrotic collections.     #. Acute necrotizing pancreatitis with presumed infected walled off necrotic collections  #. Abdominal pain  #. Intermittent nausea and vomiting  Patient initially presented to OSH on 4/25 with acute abdominal pain, nausea and vomiting, lipase >5000, BISAP 1, underwent cholecystectomy as well as ERCP the following day for positive IOC. Uncomplicated hospital stay post tracey but returned in May for nausea, vomiting and abdominal pain, found to have developing acute necrotic collections. NJT placed on 5/18 but patient unable to tolerate much due to N/V. Now admitted with fevers and leukocytosis as well as walled off necrotic collections that are possibly source of infection. The largest collection is in the right abdomen but not in a good location for endoscopic transluminal drainage so IR placed a right sided retroperitoneal perc drain 6/15 with anmol pus and GPC and GNC on gram stain  Etiology: biliary  Date of onset: 4/25/2021  BISAP score on admission: BISAP 1 (SIRS)  Concurrent organ failure: none  Thromboses: none  Diabetes: no  Current nutrition access: NJT + oral diet  Last imaging: CT scan abd/pelv with IV contrast 6/7  Infection/antiinfectives: Currently Zosyn (previously on meropenem)  Interventions:                    4/28: Cholecystectomy                    4/29: ERCP for choledocholithiasis seen on IOC         6/16: R RP Perc drain placement      Recommendations:  Drain management per IR  Continue antibiotics, follow cultures   Advance diet as tolerated  Continue NJTF - hopefully will be able to advance diet and remove tube if tolerating enough general  diet  Analgesia/Antiemetics per primary team  Discussed with primary medicine team  The patient was discussed and plan agreed upon with GI staff, Dr Velazquez.      Yoana Abbott PA-C  Advanced Endoscopy/Pancreaticobiliary GI Service  [facility]  Text Page  _______________________________________________________________      Subjective: Nursing notes and 24hr events reviewed. Patient seen and examined at 0900. Patient reports that she is feeling improved today. Tolerated drain placement. Upper abdominal pain is improved.    ROS:   4 pt ROS negative unless noted in subjective.     Objective:  Blood pressure 111/79, pulse 110, temperature 96.7  F (35.9  C), temperature source Oral, resp. rate 18, height 1.829 m (6'), weight 97.3 kg (214 lb 6.4 oz), SpO2 95 %.  Gen: Sitting in bed. Appears comfortable  HEENT: NCAT. Conjunctiva clear. Sclera anicteric  CV: tachy but regular, Peripheral perfusion intact  Resp: normal work of breathing  Abd: Soft, tender upper abdomen, ND, no guarding or rebound, +BS  Msk: no gross deformity  Skin:  no jaundice  Ext: warm, well perfused  Neuro: grossly normal  Mental status/Psych: A&O. Asks/answers questions appropriately   Drains: blood tinged/purulent output in gravity bag    Date 06/16/21 0700 - 06/17/21 0659   Shift 8157-2747 0785-1419 8928-2415 24 Hour Total   INTAKE   Other 10   10   NG/GT 30   30   Shift Total(mL/kg) 40(0.41)   40(0.41)   OUTPUT   Shift Total(mL/kg)       Weight (kg) 97.25 97.25 97.25 97.25     LABS:  BMP  Recent Labs   Lab 06/16/21  0728 06/15/21  0633 06/14/21  0249   * 134 133   POTASSIUM 3.7 3.9 4.1   CHLORIDE 98 101 100   MARTIN 8.0* 8.4* 8.4*   CO2 28 27 32   BUN 8 8 6*   CR 0.49* 0.63 0.51*   * 117* 98     CBC  Recent Labs   Lab 06/16/21  0728 06/15/21  0633 06/14/21  0249   WBC 10.6 11.4* 10.3   RBC 2.84* 2.96* 2.99*   HGB 7.3* 7.6* 7.7*   HCT 24.4* 25.3* 25.6*   MCV 86 86 86   MCH 25.7* 25.7* 25.8*   MCHC 29.9*  30.0* 30.1*   RDW 15.8* 15.9* 15.7*    345 425     INR  Recent Labs   Lab 06/14/21  0249   INR 1.12     LFTs  Recent Labs   Lab 06/16/21  0728 06/15/21  0633 06/14/21  0249   ALKPHOS 66 59 61   AST 15 18 12   ALT 11 10 14   BILITOTAL 0.5 0.6 0.5   PROTTOTAL 7.0 7.1 6.9   ALBUMIN 1.9* 2.0* 2.0*      PANC  Recent Labs   Lab 06/15/21  0633   LIPASE 69*         IMAGING:  OSH imaging reviewed

## 2021-06-16 NOTE — PLAN OF CARE
/79 (BP Location: Left arm)   Pulse 110   Temp 96.7  F (35.9  C) (Oral)   Resp 18   Ht 1.829 m (6')   Wt 97.3 kg (214 lb 6.4 oz)   SpO2 95%   BMI 29.08 kg/m     AVSS.    Neuro: A & O x4.     GI/: WDL. Pt reporting loose stools x2 this shift. Voiding not saving.     Diet: NPO this am. Currently on clears and tolerating intake well.      Incisions/Drains: Right flank Skater drain with bloody out put. Irrigated x1.     IV Access: Picc line in place.     Labs: Results as of 6/16/2021 13:29   Ref. Range 6/16/2021 07:28   WBC Latest Ref Range: 4.0 - 11.0 10e9/L 10.6   Hemoglobin Latest Ref Range: 11.7 - 15.7 g/dL 7.3 (L)       Activity: up independently.     Pain: on scheduled tylenol. No PRNs requested. Reports less abdominal pain.     New changes this shift: Started on clears. Will advanced diet as tolerated per rounds.     Plan: Continue with current POC.

## 2021-06-17 ENCOUNTER — APPOINTMENT (OUTPATIENT)
Dept: CT IMAGING | Facility: CLINIC | Age: 41
End: 2021-06-17
Attending: STUDENT IN AN ORGANIZED HEALTH CARE EDUCATION/TRAINING PROGRAM
Payer: COMMERCIAL

## 2021-06-17 LAB
ALBUMIN SERPL-MCNC: 1.9 G/DL (ref 3.4–5)
ALP SERPL-CCNC: 92 U/L (ref 40–150)
ALT SERPL W P-5'-P-CCNC: 14 U/L (ref 0–50)
ANION GAP SERPL CALCULATED.3IONS-SCNC: 5 MMOL/L (ref 3–14)
AST SERPL W P-5'-P-CCNC: 22 U/L (ref 0–45)
BASOPHILS # BLD AUTO: 0 10E9/L (ref 0–0.2)
BASOPHILS NFR BLD AUTO: 0.3 %
BILIRUB SERPL-MCNC: 0.4 MG/DL (ref 0.2–1.3)
BUN SERPL-MCNC: 8 MG/DL (ref 7–30)
CALCIUM SERPL-MCNC: 8.3 MG/DL (ref 8.5–10.1)
CHLORIDE SERPL-SCNC: 102 MMOL/L (ref 94–109)
CO2 SERPL-SCNC: 28 MMOL/L (ref 20–32)
CREAT SERPL-MCNC: 0.53 MG/DL (ref 0.52–1.04)
DIFFERENTIAL METHOD BLD: ABNORMAL
EOSINOPHIL # BLD AUTO: 0.4 10E9/L (ref 0–0.7)
EOSINOPHIL NFR BLD AUTO: 3.9 %
ERYTHROCYTE [DISTWIDTH] IN BLOOD BY AUTOMATED COUNT: 15.8 % (ref 10–15)
GFR SERPL CREATININE-BSD FRML MDRD: >90 ML/MIN/{1.73_M2}
GLUCOSE BLDC GLUCOMTR-MCNC: 132 MG/DL (ref 70–99)
GLUCOSE SERPL-MCNC: 131 MG/DL (ref 70–99)
HCT VFR BLD AUTO: 24 % (ref 35–47)
HGB BLD-MCNC: 7.1 G/DL (ref 11.7–15.7)
IMM GRANULOCYTES # BLD: 0.2 10E9/L (ref 0–0.4)
IMM GRANULOCYTES NFR BLD: 1.9 %
LYMPHOCYTES # BLD AUTO: 0.6 10E9/L (ref 0.8–5.3)
LYMPHOCYTES NFR BLD AUTO: 5.5 %
MCH RBC QN AUTO: 25.7 PG (ref 26.5–33)
MCHC RBC AUTO-ENTMCNC: 29.6 G/DL (ref 31.5–36.5)
MCV RBC AUTO: 87 FL (ref 78–100)
MONOCYTES # BLD AUTO: 0.7 10E9/L (ref 0–1.3)
MONOCYTES NFR BLD AUTO: 6.4 %
NEUTROPHILS # BLD AUTO: 8.7 10E9/L (ref 1.6–8.3)
NEUTROPHILS NFR BLD AUTO: 82 %
NRBC # BLD AUTO: 0 10*3/UL
NRBC BLD AUTO-RTO: 0 /100
PLATELET # BLD AUTO: 431 10E9/L (ref 150–450)
POTASSIUM SERPL-SCNC: 3.9 MMOL/L (ref 3.4–5.3)
PROT SERPL-MCNC: 7.4 G/DL (ref 6.8–8.8)
RBC # BLD AUTO: 2.76 10E12/L (ref 3.8–5.2)
SODIUM SERPL-SCNC: 136 MMOL/L (ref 133–144)
WBC # BLD AUTO: 10.5 10E9/L (ref 4–11)

## 2021-06-17 PROCEDURE — 250N000011 HC RX IP 250 OP 636: Performed by: STUDENT IN AN ORGANIZED HEALTH CARE EDUCATION/TRAINING PROGRAM

## 2021-06-17 PROCEDURE — 82565 ASSAY OF CREATININE: CPT | Performed by: STUDENT IN AN ORGANIZED HEALTH CARE EDUCATION/TRAINING PROGRAM

## 2021-06-17 PROCEDURE — 250N000013 HC RX MED GY IP 250 OP 250 PS 637: Performed by: STUDENT IN AN ORGANIZED HEALTH CARE EDUCATION/TRAINING PROGRAM

## 2021-06-17 PROCEDURE — 36415 COLL VENOUS BLD VENIPUNCTURE: CPT | Performed by: STUDENT IN AN ORGANIZED HEALTH CARE EDUCATION/TRAINING PROGRAM

## 2021-06-17 PROCEDURE — 120N000002 HC R&B MED SURG/OB UMMC

## 2021-06-17 PROCEDURE — 85025 COMPLETE CBC W/AUTO DIFF WBC: CPT | Performed by: STUDENT IN AN ORGANIZED HEALTH CARE EDUCATION/TRAINING PROGRAM

## 2021-06-17 PROCEDURE — 74177 CT ABD & PELVIS W/CONTRAST: CPT

## 2021-06-17 PROCEDURE — 99233 SBSQ HOSP IP/OBS HIGH 50: CPT | Performed by: PHYSICIAN ASSISTANT

## 2021-06-17 PROCEDURE — 74177 CT ABD & PELVIS W/CONTRAST: CPT | Mod: 26 | Performed by: RADIOLOGY

## 2021-06-17 PROCEDURE — 99233 SBSQ HOSP IP/OBS HIGH 50: CPT | Mod: GC | Performed by: INTERNAL MEDICINE

## 2021-06-17 PROCEDURE — 80053 COMPREHEN METABOLIC PANEL: CPT | Performed by: STUDENT IN AN ORGANIZED HEALTH CARE EDUCATION/TRAINING PROGRAM

## 2021-06-17 PROCEDURE — 999N001017 HC STATISTIC GLUCOSE BY METER IP

## 2021-06-17 RX ORDER — ACETAMINOPHEN 325 MG/10.15ML
650 LIQUID ORAL EVERY 4 HOURS PRN
Status: DISCONTINUED | OUTPATIENT
Start: 2021-06-17 | End: 2021-06-18

## 2021-06-17 RX ORDER — IOPAMIDOL 755 MG/ML
131 INJECTION, SOLUTION INTRAVASCULAR ONCE
Status: COMPLETED | OUTPATIENT
Start: 2021-06-17 | End: 2021-06-17

## 2021-06-17 RX ORDER — SIMETHICONE 40MG/0.6ML
40 SUSPENSION, DROPS(FINAL DOSAGE FORM)(ML) ORAL EVERY 6 HOURS PRN
Status: DISCONTINUED | OUTPATIENT
Start: 2021-06-17 | End: 2021-06-23 | Stop reason: HOSPADM

## 2021-06-17 RX ORDER — LOPERAMIDE HCL 2 MG
2 CAPSULE ORAL 4 TIMES DAILY PRN
Status: DISCONTINUED | OUTPATIENT
Start: 2021-06-17 | End: 2021-06-23 | Stop reason: HOSPADM

## 2021-06-17 RX ORDER — ACETAMINOPHEN 325 MG/1
650 TABLET ORAL EVERY 4 HOURS PRN
Status: DISCONTINUED | OUTPATIENT
Start: 2021-06-17 | End: 2021-06-17

## 2021-06-17 RX ORDER — MEROPENEM 1 G/1
1 INJECTION, POWDER, FOR SOLUTION INTRAVENOUS EVERY 8 HOURS
Status: DISCONTINUED | OUTPATIENT
Start: 2021-06-17 | End: 2021-06-20 | Stop reason: ALTCHOICE

## 2021-06-17 RX ORDER — LORAZEPAM 2 MG/ML
1 CONCENTRATE ORAL ONCE
Status: COMPLETED | OUTPATIENT
Start: 2021-06-17 | End: 2021-06-17

## 2021-06-17 RX ADMIN — ACETAMINOPHEN 650 MG: 325 SOLUTION ORAL at 05:28

## 2021-06-17 RX ADMIN — PIPERACILLIN SODIUM AND TAZOBACTAM SODIUM 4.5 G: 4; .5 INJECTION, POWDER, LYOPHILIZED, FOR SOLUTION INTRAVENOUS at 04:02

## 2021-06-17 RX ADMIN — HEPARIN SODIUM 5000 UNITS: 5000 INJECTION, SOLUTION INTRAVENOUS; SUBCUTANEOUS at 20:16

## 2021-06-17 RX ADMIN — PROCHLORPERAZINE EDISYLATE 10 MG: 5 INJECTION INTRAMUSCULAR; INTRAVENOUS at 05:31

## 2021-06-17 RX ADMIN — OXYCODONE HYDROCHLORIDE 10 MG: 5 SOLUTION ORAL at 01:00

## 2021-06-17 RX ADMIN — HEPARIN SODIUM 5000 UNITS: 5000 INJECTION, SOLUTION INTRAVENOUS; SUBCUTANEOUS at 08:43

## 2021-06-17 RX ADMIN — Medication 2 PACKET: at 20:16

## 2021-06-17 RX ADMIN — PIPERACILLIN SODIUM AND TAZOBACTAM SODIUM 4.5 G: 4; .5 INJECTION, POWDER, LYOPHILIZED, FOR SOLUTION INTRAVENOUS at 11:25

## 2021-06-17 RX ADMIN — Medication 2 PACKET: at 08:43

## 2021-06-17 RX ADMIN — ACETAMINOPHEN 650 MG: 325 SOLUTION ORAL at 22:59

## 2021-06-17 RX ADMIN — ONDANSETRON 4 MG: 4 TABLET, ORALLY DISINTEGRATING ORAL at 12:36

## 2021-06-17 RX ADMIN — OXYCODONE HYDROCHLORIDE 10 MG: 5 SOLUTION ORAL at 12:36

## 2021-06-17 RX ADMIN — LORAZEPAM 1 MG: 2 SOLUTION, CONCENTRATE ORAL at 22:18

## 2021-06-17 RX ADMIN — IOPAMIDOL 131 ML: 755 INJECTION, SOLUTION INTRAVENOUS at 12:13

## 2021-06-17 RX ADMIN — MEROPENEM 1 G: 1 INJECTION, POWDER, FOR SOLUTION INTRAVENOUS at 16:04

## 2021-06-17 RX ADMIN — PROCHLORPERAZINE EDISYLATE 10 MG: 5 INJECTION INTRAMUSCULAR; INTRAVENOUS at 16:04

## 2021-06-17 RX ADMIN — ONDANSETRON 4 MG: 2 INJECTION INTRAMUSCULAR; INTRAVENOUS at 03:27

## 2021-06-17 RX ADMIN — ACETAMINOPHEN 650 MG: 325 SOLUTION ORAL at 16:26

## 2021-06-17 ASSESSMENT — ACTIVITIES OF DAILY LIVING (ADL)
ADLS_ACUITY_SCORE: 16

## 2021-06-17 ASSESSMENT — MIFFLIN-ST. JEOR: SCORE: 1756

## 2021-06-17 NOTE — PLAN OF CARE
Vitals:    06/17/21 0330 06/17/21 0453 06/17/21 0801 06/17/21 1142   BP: 120/73  125/69 139/86   BP Location: Left arm  Left arm Left arm   Pulse: 105  102 106   Resp: 20 20 18   Temp: 98.4  F (36.9  C) 99.8  F (37.7  C) 96.4  F (35.8  C) 96.3  F (35.7  C)   TempSrc: Oral Oral Oral Oral   SpO2: 94%  94% 96%   Weight:       Height:       AVSS.    Neuro: A & O x4.     GI/: Reported x3 loose stools. Voiding in good amounts.     Diet: Clears this am. Currently regular diet. C/o nausea & Zofran given x 1.     Incisions/Drains: NJ with tube feeding at 40cc per hr. Kept as 40 ml per pt's request.    IV Access: Picc line at O for antibiotics.     Labs: Results as of 6/17/2021 13:46   Ref. Range 6/17/2021 07:36   WBC Latest Ref Range: 4.0 - 11.0 10e9/L 10.5   Hemoglobin Latest Ref Range: 11.7 - 15.7 g/dL 7.1 (L)       Activity: independent with ADLS.     Pain: Oxycodone 10 mg x 1.     New changes this shift: CT of abdomen and pelvis.     Plan: Continue with current POC.

## 2021-06-17 NOTE — PROGRESS NOTES
CLINICAL NUTRITION SERVICES - BRIEF NOTE     Nutrition Prescription    RECOMMENDATIONS FOR MDs/PROVIDERS TO ORDER:  - Recommend switching liquids tylenol to pill form.  Its sugar alcohol content can cause osmotic diarrhea and she is taking q 4 hr.      Recommendations already ordered by Registered Dietitian (RD):  - Continue Osmolite 1.5 formula (currently @ 40 ml/hr due to increased nausea/abd pain last noc).  Goal rate is 65 ml/hr.  -Continue Prosource packets 5 times/day.   - Start calorie counts as diet advanced from clear liquids this AM to regular  - Start Ensure Clear--mixed berry flavor--1x/d in PM.   - check fecal elastase for possible EPI.   Future/Additional Recommendations:  - If loose stools continue despite changing tylenol to pill form, will likely need trial of semi-elemental formula:  Vital 1.5 Sergei @ goal of  65ml/hr  (1560ml/day) + 5 packets prosource will provide: 2540 kcals (32 kcal/kg), 160 g PRO (2.0 gm/kg), 1191 ml free H20, 291 g CHO, and 9 g fiber daily.  - Adjust TF volume based on expected po intake.      Pt notes loose stools--has never had them this bad in the past.  Previously was tolerating current TF formula for past month.     EVALUATION OF THE PROGRESS TOWARD GOALS   Diet: advanced to regular   Nutrition Support: Osmolite 1.5 Sergei @ 40ml/hr  (960ml/day) + 6 prosource packets will provide: 1680 kcals (21 kcal/kg, 126 g PRO (1.6 gm/kg), 731 ml free H20, 195 g CHO, and 0 g fiber daily.  Intake: Pt hadn't eaten much thus far when I saw her after returning from abd CT.        NEW FINDINGS   - Noted liquid tylenol given q 4 hr since 6/14  GI--loose watery stools documented.  Pt did notice oil slick on toilet water.  CT abd/pelvis still pending from this morning.      INTERVENTIONS  Education--discussed plan for TF changes late yesterday with necrotizing pancreatitis due to expected high calorie and protein needs.   Medical nutrition supplements.  See recommendations above.      Monitoring/Evaluation  Progress toward goals will be monitored and evaluated per protocol.     Ambika Griffiths RD, LD   7B (M-F) Pager: [phone]  RD Weekend Pager: [phone]

## 2021-06-17 NOTE — PLAN OF CARE
/73 (BP Location: Left arm)   Pulse 105   Temp 98.4  F (36.9  C) (Oral)   Resp 20   Ht 1.829 m (6')   Wt 97.3 kg (214 lb 6.4 oz)   SpO2 94%   BMI 29.08 kg/m      Neuro: A&Ox4, cooperative, pleasant  Cardiac: Tmax of 101.3, PRN tylenol given, denies chest pain  Respiratory: on RA  GI/: voiding adequately, pt having loose stools - PRN immodium ordered for pt, Cdiff came back neg  Skin: no new deficits noted  Pain: PRN oxy given x1  LDA: triple lumen PICC, R flank drain irrigated per order  Activity: SBA, pt ambulated to bathroom several times  Diet/Appetite: TF running @ 40mL/hr d/t nausea - team aware and OK with this, clears, PRN zofran given for nausea, Prn compazine given  Plan: continue POC

## 2021-06-17 NOTE — PROGRESS NOTES
GASTROENTEROLOGY PROGRESS NOTE    Date of Admission: 6/13/2021  Reason for Admission: abdominal pain, fever      Assessment:  40 year old female with a history of gallstone pancreatitis who underwent cholecystectomy (4/28) and ERCP (4/29) for choledocholithiasis at OSH who was transferred for concern of infected walled off necrotic collections.     #. Acute necrotizing pancreatitis with presumed infected walled off necrotic collections  #. Abdominal pain  #. Intermittent nausea and vomiting  Patient initially presented to OSH on 4/25 with acute abdominal pain, nausea and vomiting, lipase >5000, BISAP 1, underwent cholecystectomy as well as ERCP the following day for positive IOC. Uncomplicated hospital stay post tracey but returned in May for nausea, vomiting and abdominal pain, found to have developing acute necrotic collections. NJT placed on 5/18 but patient unable to tolerate much due to N/V. Now admitted with fevers and leukocytosis as well as walled off necrotic collections that are possibly source of infection. The largest collection is in the right abdomen but not in a good location for endoscopic transluminal drainage so IR placed a right sided retroperitoneal perc drain 6/15 with anmol pus and GPC and GNC on gram stain  Etiology: biliary  Date of onset: 4/25/2021  BISAP score on admission: BISAP 1 (SIRS)  Concurrent organ failure: none  Thromboses: none  Diabetes: no  Current nutrition access: NJT + oral diet  Last imaging: CT scan abd/pelv with IV contrast 6/7  Infection/antiinfectives: Currently Zosyn (previously on meropenem)  Interventions:                    4/28: Cholecystectomy                    4/29: ERCP for choledocholithiasis seen on IOC         6/16: R RP Perc drain placement     Updates: Again febrile to 101.3F overnight but pain improved. Collection growing S. Anginosis (had 1/2 +blood culture with same organism at OSH), Enterobacter, Eikenella and yeast like organisms.      Recommendations:  Repeat CT scan abd/pelv for evaluation of un-drained collections  Continue antibiotics, follow cultures   Advance diet as tolerated. Start calorie counts Friday and if not meeting requirements will consider PEG-J early next week  Continue NJTF  Analgesia/Antiemetics per primary team  Discussed with primary medicine team  The patient was discussed and plan agreed upon with GI staff, Dr Velazquez.      Yoana Abbott PA-C  Advanced Endoscopy/Pancreaticobiliary GI Service  [facility]  Text Page  _______________________________________________________________      Subjective: Nursing notes and 24hr events reviewed. Patient seen and examined this morning. Still feeling better but not eating much. Upper abdominal pain still feeling better. Again febrile overnight. Still having watery diarrhea but C diff negative.    ROS:   4 pt ROS negative unless noted in subjective.     Objective:  Blood pressure 139/86, pulse 106, temperature 96.3  F (35.7  C), temperature source Oral, resp. rate 18, height 1.829 m (6'), weight 97.3 kg (214 lb 6.4 oz), SpO2 96 %.  Gen: Sitting in bed. Appears comfortable  HEENT: NCAT. Conjunctiva clear. Sclera anicteric  CV: tachy but regular, Peripheral perfusion intact  Resp: normal work of breathing  Abd: Soft, tender upper abdomen, ND, no guarding or rebound, +BS  Msk: no gross deformity  Skin:  no jaundice  Ext: warm, well perfused  Neuro: grossly normal  Mental status/Psych: A&O. Asks/answers questions appropriately   Drains: blood tinged/purulent output in gravity bag    Date 06/16/21 0700 - 06/17/21 0659   Shift 2989-8484 1110-3519 8140-5210 24 Hour Total   INTAKE   Other 10   10   NG/GT 30   30   Shift Total(mL/kg) 40(0.41)   40(0.41)   OUTPUT   Shift Total(mL/kg)       Weight (kg) 97.25 97.25 97.25 97.25     LABS:  [facility]  Recent Labs   Lab 06/17/21  0736 06/16/21  0728 06/15/21  0633 06/14/21  0249    132* 134 133   POTASSIUM 3.9 3.7  3.9 4.1   CHLORIDE 102 98 101 100   MARTIN 8.3* 8.0* 8.4* 8.4*   CO2 28 28 27 32   BUN 8 8 8 6*   CR 0.53 0.49* 0.63 0.51*   * 119* 117* 98     CBC  Recent Labs   Lab 06/17/21  0736 06/16/21  0728 06/15/21  0633 06/14/21  0249   WBC 10.5 10.6 11.4* 10.3   RBC 2.76* 2.84* 2.96* 2.99*   HGB 7.1* 7.3* 7.6* 7.7*   HCT 24.0* 24.4* 25.3* 25.6*   MCV 87 86 86 86   MCH 25.7* 25.7* 25.7* 25.8*   MCHC 29.6* 29.9* 30.0* 30.1*   RDW 15.8* 15.8* 15.9* 15.7*    417 345 425     INR  Recent Labs   Lab 06/14/21  0249   INR 1.12     LFTs  Recent Labs   Lab 06/17/21  0736 06/16/21  0728 06/15/21  0633 06/14/21  0249   ALKPHOS 92 66 59 61   AST 22 15 18 12   ALT 14 11 10 14   BILITOTAL 0.4 0.5 0.6 0.5   PROTTOTAL 7.4 7.0 7.1 6.9   ALBUMIN 1.9* 1.9* 2.0* 2.0*      PANC  Recent Labs   Lab 06/15/21  0633   LIPASE 69*         IMAGING:  OSH imaging reviewed

## 2021-06-17 NOTE — PROGRESS NOTES
[facility], [place]     Daily Progress Note - [facility] Service        Date of service: 06/14/21      Assessment & Plan  Alexandra Nunez is a 40-year-old woman with history of gallstone pancreatitis s/p cholecystectomy w/ ERCP 4/2021 c/b peripancreatic fluid collection abutting the stomach necessitating NJ tube placement, who was recently admitted to [facility] for persistent abdominal pain with new concern for infected peripancreatic fluid collection. She is transferred here for further management and is now s/p RP drain placement on 6/15.    Sepsis 2/2 acute necrotizing pancreatitis with infected walled off necrotic collections  S/p RP drain placement (6/15)  Hx gallstone pancreatitis s/p cholecystectomy & ERCP (4/2021)  Initially presented with gallstone pancreatitis in 4/2021, underwent lap tracey on 4/28 and ERCP on 4/29. Recovery c/b development of acute enrrique-pancreatic fluid collections and inability to tolerate PO intake and required NJT placement 5/18 with tube feeds at home. Admitted with increased abdominal pain and fevers now s/p RP drain placed into necrotic peripancreatic fluid collection.  - GI panc/bili following, appreciate recs  - Follow cultures from abscess  - Continue zosyn pending culture date  - Pain control: tylenol prn, liquid oxycodone prn  - Anti-emetics prn  - ADAT, will assess for ability to remove NJ prior to discharge  - Repeat CT abdomen/pelvis today    Severe malnutrition in the context of acute on chronic illness  Based on: weight loss, moderate/severe subcutaneous fat loss, moderate/severe muscle loss. NJ in place due to inability to tolerate PO.  - Nutrition consult  - TF per dietician orders  - ADAT, will monitor over next 2-3 days and discuss plan for removing NJ vs continuing vs PEG/J    Chronic problems:  2nd-degree AV block c/b syncopal episodes s/p pacemaker: [employer] pacemaker placed when she was 14, has had known non-functioning atrial  lead for years.   Anxiety: Was intermittently taking xanax at home. Monitor symptoms here.  Mild intermittent asthma: Albuterol prn.      Diet: TF, trial advancing diet  DVT Prophylaxis: SubQ heparin  Code Status: Full code    Disposition:  Expected discharge: 4 - 7 days, recommended to prior living arrangement once adequate pain management/ tolerating PO medications, antibiotic plan established and nutrition plan in place.      Patient staffed with attending physician, Dr. Long.    Amy Glaser MD  Internal Medicine, PGY-2  [facility]  Pager: [phone]  [facility]   Please see sign in/sign out for up to date coverage information  _____________________________________________________________________    Interval History  Nursing notes reviewed. No acute events overnight. Febrile to 101.3 again overnight. Continues to have nausea, TF slowed down to manage in addition to prns. She had several episodes of dry heaving/burping but no vomiting. This improved with the slower TF rate. Pain is overall well controlled.    4-point ROS negative unless mentioned above.    Physical Exam  /73 (BP Location: Left arm)   Pulse 105   Temp 99.8  F (37.7  C) (Oral)   Resp 20   Ht 1.829 m (6')   Wt 97.3 kg (214 lb 6.4 oz)   SpO2 94%   BMI 29.08 kg/m    214 lbs 6.4 oz    Constitutional: awake, alert, cooperative, no acute distress  Respiratory: breathing comfortably on room air  Cardiovascular: tachycardic, regular  GI: soft, non-distended, NJ in place, RP drain in place with red serosanguinous output  Skin: normal skin color, texture, turgor  Musculoskeletal: no lower extremity edema present  Neurologic: alert and oriented x3, CNs II-XII grossly intact, moving all extremities equally      Labs and Imaging  All labs and imaging reviewed. Pertinent below.    Cr 0.53  WBC 10.5  Hgb 7.1    Pancreatic abscess culture:  Light growth Gram negative rods  Gram stain:  Gram positive  cocci  Gram negative coccobacilli

## 2021-06-17 NOTE — PLAN OF CARE
1530-1930:  Neuro: AOx4, neuros intact.  Cardiac: Denies chest pain, has a permanent pacemaker  Respiratory: Lungs clear. O2 sats mid-90s on RA.  GI/: Voiding adequately, BMs loose today. Cdiff ordered.  Diet/Appetite: TF infusing at 60 mL/hr. Tolerating clears. Denies nausea.  Skin: No new deficits noted  LDA: triple lumen PICC. R flank drain, irrigated q shift.  Activity: SBA  Pain: Right abdomen drain site managed with Tylenol x1 during this period.  Plan: Continue POC. Notify MD with changes/concerns.

## 2021-06-18 LAB
ALBUMIN SERPL-MCNC: 2 G/DL (ref 3.4–5)
ALP SERPL-CCNC: 392 U/L (ref 40–150)
ALT SERPL W P-5'-P-CCNC: 81 U/L (ref 0–50)
ANION GAP SERPL CALCULATED.3IONS-SCNC: 6 MMOL/L (ref 3–14)
AST SERPL W P-5'-P-CCNC: 102 U/L (ref 0–45)
BACTERIA SPEC CULT: ABNORMAL
BASOPHILS # BLD AUTO: 0 10E9/L (ref 0–0.2)
BASOPHILS NFR BLD AUTO: 0.3 %
BILIRUB SERPL-MCNC: 0.4 MG/DL (ref 0.2–1.3)
BUN SERPL-MCNC: 9 MG/DL (ref 7–30)
CALCIUM SERPL-MCNC: 8.4 MG/DL (ref 8.5–10.1)
CHLORIDE SERPL-SCNC: 103 MMOL/L (ref 94–109)
CO2 SERPL-SCNC: 27 MMOL/L (ref 20–32)
CREAT SERPL-MCNC: 0.46 MG/DL (ref 0.52–1.04)
CRP SERPL-MCNC: 110 MG/L (ref 0–8)
DIFFERENTIAL METHOD BLD: ABNORMAL
EOSINOPHIL # BLD AUTO: 0.5 10E9/L (ref 0–0.7)
EOSINOPHIL NFR BLD AUTO: 4.9 %
ERYTHROCYTE [DISTWIDTH] IN BLOOD BY AUTOMATED COUNT: 15.9 % (ref 10–15)
GFR SERPL CREATININE-BSD FRML MDRD: >90 ML/MIN/{1.73_M2}
GLUCOSE SERPL-MCNC: 116 MG/DL (ref 70–99)
HCT VFR BLD AUTO: 26.1 % (ref 35–47)
HGB BLD-MCNC: 7.6 G/DL (ref 11.7–15.7)
IMM GRANULOCYTES # BLD: 0.1 10E9/L (ref 0–0.4)
IMM GRANULOCYTES NFR BLD: 1.4 %
LYMPHOCYTES # BLD AUTO: 0.8 10E9/L (ref 0.8–5.3)
LYMPHOCYTES NFR BLD AUTO: 7.8 %
Lab: ABNORMAL
MCH RBC QN AUTO: 25.7 PG (ref 26.5–33)
MCHC RBC AUTO-ENTMCNC: 29.1 G/DL (ref 31.5–36.5)
MCV RBC AUTO: 88 FL (ref 78–100)
MONOCYTES # BLD AUTO: 0.5 10E9/L (ref 0–1.3)
MONOCYTES NFR BLD AUTO: 5 %
NEUTROPHILS # BLD AUTO: 8.3 10E9/L (ref 1.6–8.3)
NEUTROPHILS NFR BLD AUTO: 80.6 %
NRBC # BLD AUTO: 0 10*3/UL
NRBC BLD AUTO-RTO: 0 /100
PLATELET # BLD AUTO: 501 10E9/L (ref 150–450)
POTASSIUM SERPL-SCNC: 4.2 MMOL/L (ref 3.4–5.3)
PROT SERPL-MCNC: 7.6 G/DL (ref 6.8–8.8)
RBC # BLD AUTO: 2.96 10E12/L (ref 3.8–5.2)
SODIUM SERPL-SCNC: 136 MMOL/L (ref 133–144)
SPECIMEN SOURCE: ABNORMAL
WBC # BLD AUTO: 10.3 10E9/L (ref 4–11)

## 2021-06-18 PROCEDURE — 250N000011 HC RX IP 250 OP 636: Performed by: STUDENT IN AN ORGANIZED HEALTH CARE EDUCATION/TRAINING PROGRAM

## 2021-06-18 PROCEDURE — 258N000003 HC RX IP 258 OP 636: Performed by: INTERNAL MEDICINE

## 2021-06-18 PROCEDURE — 250N000011 HC RX IP 250 OP 636: Performed by: INTERNAL MEDICINE

## 2021-06-18 PROCEDURE — 99254 IP/OBS CNSLTJ NEW/EST MOD 60: CPT | Mod: GC | Performed by: INTERNAL MEDICINE

## 2021-06-18 PROCEDURE — 250N000013 HC RX MED GY IP 250 OP 250 PS 637: Performed by: STUDENT IN AN ORGANIZED HEALTH CARE EDUCATION/TRAINING PROGRAM

## 2021-06-18 PROCEDURE — 99231 SBSQ HOSP IP/OBS SF/LOW 25: CPT | Mod: GC | Performed by: INTERNAL MEDICINE

## 2021-06-18 PROCEDURE — 86140 C-REACTIVE PROTEIN: CPT | Performed by: STUDENT IN AN ORGANIZED HEALTH CARE EDUCATION/TRAINING PROGRAM

## 2021-06-18 PROCEDURE — 85025 COMPLETE CBC W/AUTO DIFF WBC: CPT | Performed by: STUDENT IN AN ORGANIZED HEALTH CARE EDUCATION/TRAINING PROGRAM

## 2021-06-18 PROCEDURE — 80053 COMPREHEN METABOLIC PANEL: CPT | Performed by: STUDENT IN AN ORGANIZED HEALTH CARE EDUCATION/TRAINING PROGRAM

## 2021-06-18 PROCEDURE — 36592 COLLECT BLOOD FROM PICC: CPT | Performed by: STUDENT IN AN ORGANIZED HEALTH CARE EDUCATION/TRAINING PROGRAM

## 2021-06-18 PROCEDURE — 120N000002 HC R&B MED SURG/OB UMMC

## 2021-06-18 PROCEDURE — 99233 SBSQ HOSP IP/OBS HIGH 50: CPT | Performed by: INTERNAL MEDICINE

## 2021-06-18 RX ORDER — ACETAMINOPHEN 325 MG/1
650 TABLET ORAL EVERY 4 HOURS PRN
Status: DISCONTINUED | OUTPATIENT
Start: 2021-06-18 | End: 2021-06-23 | Stop reason: HOSPADM

## 2021-06-18 RX ADMIN — OXYCODONE HYDROCHLORIDE 5 MG: 5 SOLUTION ORAL at 19:55

## 2021-06-18 RX ADMIN — MICAFUNGIN SODIUM 150 MG: 50 INJECTION, POWDER, LYOPHILIZED, FOR SOLUTION INTRAVENOUS at 11:53

## 2021-06-18 RX ADMIN — MEROPENEM 1 G: 1 INJECTION, POWDER, FOR SOLUTION INTRAVENOUS at 07:59

## 2021-06-18 RX ADMIN — MEROPENEM 1 G: 1 INJECTION, POWDER, FOR SOLUTION INTRAVENOUS at 00:30

## 2021-06-18 RX ADMIN — Medication 2 PACKET: at 19:55

## 2021-06-18 RX ADMIN — HEPARIN SODIUM 5000 UNITS: 5000 INJECTION, SOLUTION INTRAVENOUS; SUBCUTANEOUS at 19:55

## 2021-06-18 RX ADMIN — Medication 2 PACKET: at 08:04

## 2021-06-18 RX ADMIN — MEROPENEM 1 G: 1 INJECTION, POWDER, FOR SOLUTION INTRAVENOUS at 23:38

## 2021-06-18 RX ADMIN — LOPERAMIDE HYDROCHLORIDE 2 MG: 2 CAPSULE ORAL at 09:37

## 2021-06-18 RX ADMIN — ONDANSETRON 4 MG: 2 INJECTION INTRAMUSCULAR; INTRAVENOUS at 17:42

## 2021-06-18 RX ADMIN — HEPARIN SODIUM 5000 UNITS: 5000 INJECTION, SOLUTION INTRAVENOUS; SUBCUTANEOUS at 07:58

## 2021-06-18 RX ADMIN — MEROPENEM 1 G: 1 INJECTION, POWDER, FOR SOLUTION INTRAVENOUS at 15:51

## 2021-06-18 RX ADMIN — Medication 2 PACKET: at 14:06

## 2021-06-18 ASSESSMENT — ACTIVITIES OF DAILY LIVING (ADL)
ADLS_ACUITY_SCORE: 16
ADLS_ACUITY_SCORE: 19
ADLS_ACUITY_SCORE: 16

## 2021-06-18 ASSESSMENT — MIFFLIN-ST. JEOR
SCORE: 1754
SCORE: 1717.3

## 2021-06-18 NOTE — PLAN OF CARE
1500 to 1930:VSS, except ,A&Ox4 ,c/o mild abdominal pain at drain site but refused pain medication,pt. c/o nausea and had one small emesis ,IV Zofran given,tube  feeding was increased from 50 ml's to 60 ml's/hr but pt. reported increased nausea ,than tube feeding decreased back to 50 ml's.Right flank drain site dressing clean and dry,drain irrigated with 10 ml of NS,LS diminished ,right side some crackles noted,BS+,denied numbness and tingling,reported adequate urinary output and BM.Pt is up with SBA.Will continue to monitor.

## 2021-06-18 NOTE — PROGRESS NOTES
CLINICAL NUTRITION SERVICES - BRIEF NOTE     Nutrition Prescription    RECOMMENDATIONS FOR MDs/PROVIDERS TO ORDER:  - if loose stools don't resolve with discontinuing liquid tylenol, should switch to a semi-elemental formula.  Would recommend Vital 1.5 @ 65 ml/hr + 5 packets prosource will provide: 2540 kcals (32 kcal/kg), 160 g PRO (2.0 gm/kg), 1191 ml free H20, 291 g CHO, and 9 g fiber daily.  - Recommend increasing water flushes to 100 ml q 4 hours to better meet fluid needs until oral fluid intake improves.     Recommendations already ordered by Registered Dietitian (RD):  - continue advancing Osmolite 1.5 to goal rate of 65 ml/hr and continue Prosource packets (2 TID) to provide 2580 kcals (33 kcal/kg), 163 g PRO (2,1 gm/kg), 1188 ml free H20, 317 g CHO, and 0 g fiber daily.  - Continue calorie counts.     Future/Additional Recommendations:  - Follow po intake and just TF as oral intake allows to meet high needs with necrotizing pancreatitis (anticipate she will need at least nocturnal TF).   - monitor GI function, labs (fecal elastase pending), for potential need for semi-elemental formula and/or PERT.      Spoke with pt today.  She feels her GI upset is improving today.  She is hopeful that she can continue with her current TF formula as she tolerated it for the past month.  I explained her ability to digest and absorb the TF may impaired with current pancreatitis but that it is difficult to  with the concurrent start of liquid tylenol likely causing osmotic diarrhea.     EVALUATION OF THE PROGRESS TOWARD GOALS   Diet: regular diet + Berry Ensure Clear  Nutrition Support: Osmolite 1.5 ran @ 40 ml/hr since early AM 6/17 due to diarrhea, nausea/dry heaving and rate advanced today to 50 ml/hr with goal rate of 65 ml/hr.  At goal of  65ml/hr  (1560ml/day) + 6 packets Prosource will provide: 2580 kcals (33 kcal/kg), 163 g PRO (2,1 gm/kg), 1188 ml free H20, 317 g CHO, and 0 g fiber daily.  Intake: With  nausea, abdominal pain and diarrhea 6/17, po intake was minimal.  Today is able to start eating a little more.  Pt doesn't really care for the Ensure Clear but thinks she can tolerate it if it's diluted a little more and over ice. (Doesn't like milky supplement drinks after throwing one up in the past.)    Free water flushes: 30 ml q 4 hr to keep line patent.      NEW FINDINGS   Paged resident last night to switch liquid tylenol to pill form late yesterday.  She had been receiving it q 4 hr.  This morning paged team again and it was changed.  Pt had two loose stools earlier today but no liquid tylenol today so feels that GI upset is settling down.   Labs:  with recent fevers.     GI: CT ABD 6/17:  Multiple infected walled off necrotic collections as described above, decreased in size from prior exam. Interval placement of drain in the gastrohepatic collection. No new collections.    Weights: Wt down 3# in 3 days comparing standing weights.  No IVF ordered. Tachy 111, 110 today.  06/18/21 0912 94.5 kg (208 lb 6.4 oz) standing   06/17/21 1538 98.4 kg (216 lb 14.9 oz) bed   06/16/21 1101 97.3 kg (214 lb 6.4 oz) no source   06/15/21 1017 96 kg (211 lb 9.6 oz) standing   06/14/21 0100 97.3 kg (214 lb 8.1 oz) no source     INTERVENTIONS  Education--discussed plan for advancing TF to goal along with prosource and following danilo counts.  Also discussed possible need for switch to Vital 1.5 and possible PERT based on Sx. Explained effect of liquid tylenol on gut.  See recommendations above.     Monitoring/Evaluation  Progress toward goals will be monitored and evaluated per protocol.     Ambika Griffiths RD, LD   7B (M-F) Pager: [phone]  RD Weekend Pager: [phone]

## 2021-06-18 NOTE — PROGRESS NOTES
GASTROENTEROLOGY PROGRESS NOTE    Date of Admission: 6/13/2021  Reason for Admission: abdominal pain, fever    Assessment:  40 year old female with a history of gallstone pancreatitis who underwent cholecystectomy (4/28) and ERCP (4/29) for choledocholithiasis at OSH who was transferred for concern of infected walled off necrotic collections.     #. Acute necrotizing pancreatitis with presumed infected walled off necrotic collections  #. Abdominal pain  #. Intermittent nausea and vomiting  Patient initially presented to OSH on 4/25 with acute abdominal pain, nausea and vomiting, lipase >5000, BISAP 1, underwent cholecystectomy as well as ERCP the following day for positive IOC. Uncomplicated hospital stay post cholecystectomy, but returned in May for nausea, vomiting and abdominal pain, found to have developing acute necrotic collections. NJT placed on 5/18 but patient unable to tolerate much due to N/V.     Now admitted with fevers and leukocytosis as well as walled off necrotic collections that are possibly source of infection. The largest collection is in the right abdomen but not in a good location for endoscopic transluminal drainage so IR placed a right sided retroperitoneal perc drain 6/15 with anmol pus - micro + for strep angionosus, enterobacter, eikenella corrodens & yeast like organisms.     Etiology: biliary  Date of onset: 4/25/2021  BISAP score on admission: BISAP 1 (SIRS)  Concurrent organ failure: none  Thromboses: none  Diabetes: no  Current nutrition access: NJT + oral diet  Last imaging: CT scan abd/pelv with IV contrast 6/17  Infection/antiinfectives: Meropenem + Micafungin (6/18 - )   Interventions:                    4/28: Cholecystectomy                    4/29: ERCP for choledocholithiasis seen on IOC         6/16: R RP Perc drain placement     Updates: Febrile to 100.7 overnight. Liver enzymes - alk phos, AST and ALT - rising. CT on 6/17 with improving collections. Abdominal pain  improving/stable. Tolerated ice cream + eggs this AM. Given clinical improvement, agree with meropenem + micafungin at this time.     Recommendations:  -- Please ensure liver enzymes are ordered for tomorrow AM to assess trend  -- Continue antibiotics, agree with micafungin given yeast growing in collections  -- Advance diet as tolerated with calorie counts (6/18-6/20)  -- Continue NJ tube feeding  -- Supportive care - including analgesia and anti-emetics - per primary team    The patient was discussed and plan agreed upon with GI staff, Dr Velazquez.    Bea Herrera MD  [phone]  _______________________________________________________________      Subjective: Nursing notes and 24hr events reviewed.   - Tmax of 100.7 overnight  - Abdominal pain is stable/improving, denies nausea or emesis  - Was able to eat ice cream and eggs this AM; tolerated well  - Denies CP or SOB    ROS:   4 pt ROS negative unless noted in subjective.     Objective:  Blood pressure 129/84, pulse 111, temperature 98.3  F (36.8  C), temperature source Axillary, resp. rate 16, height 1.829 m (6'), weight 94.5 kg (208 lb 6.4 oz), SpO2 96 %.  Gen: Sitting in bed. Appears comfortable. Temporal wasting  HEENT: Sclera anicteric. NJ tube in place  CV: Tachycardic. No Shikha edema  Resp: Breathing comfortably on RA  Abd: Soft, tender upper abdomen, no guarding or rebound,  Skin:  no jaundice  Ext: warm, well perfused  Mental status/Psych: A&O. Asks/answers questions appropriately   Drains: purulent output in gravity bag     Date 06/16/21 0700 - 06/17/21 0659   Shift 0304-3129 4342-3374 9611-7676 24 Hour Total   INTAKE   Other 10   10   NG/GT 30   30   Shift Total(mL/kg) 40(0.41)   40(0.41)   OUTPUT   Shift Total(mL/kg)       Weight (kg) 97.25 97.25 97.25 97.25     LABS:  [facility]  Recent Labs   Lab 06/18/21  0739 06/17/21  0736 06/16/21  0728 06/15/21  0633    136 132* 134   POTASSIUM 4.2 3.9 3.7 3.9   CHLORIDE 103 102 98 101   MARTIN 8.4* 8.3* 8.0* 8.4*   CO2  27 28 28 27   BUN 9 8 8 8   CR 0.46* 0.53 0.49* 0.63   * 131* 119* 117*     CBC  Recent Labs   Lab 06/18/21  0739 06/17/21  0736 06/16/21  0728 06/15/21  0633   WBC 10.3 10.5 10.6 11.4*   RBC 2.96* 2.76* 2.84* 2.96*   HGB 7.6* 7.1* 7.3* 7.6*   HCT 26.1* 24.0* 24.4* 25.3*   MCV 88 87 86 86   MCH 25.7* 25.7* 25.7* 25.7*   MCHC 29.1* 29.6* 29.9* 30.0*   RDW 15.9* 15.8* 15.8* 15.9*   * 431 417 345     LFTs  Recent Labs   Lab 06/18/21  0739 06/17/21  0736 06/16/21  0728 06/15/21  0633   ALKPHOS 392* 92 66 59   * 22 15 18   ALT 81* 14 11 10   BILITOTAL 0.4 0.4 0.5 0.6   PROTTOTAL 7.6 7.4 7.0 7.1   ALBUMIN 2.0* 1.9* 1.9* 2.0*      IMAGING:  CT Abdomen Pelvis w/Contrast 6/17/21:  1. Multiple infected walled off necrotic collections as described  above, decreased in size from prior exam. Interval placement of drain  in the gastrohepatic collection. No new collections.  2. Pneumobilia consistent with patient's recent sphincterotomy.  3. Mild dilatation of the common bile duct measuring up to 1.1 cm,  likely reservoir effect given history of cholecystectomy.

## 2021-06-18 NOTE — PLAN OF CARE
Patient denies pain.Ambulated leary with SBA,gait steady.Advanced t-fdg 50ml/hour without apparent GI upset.Loose stoolX 2,initiated Imodium.Received Meropenem and Micafungin per orders.Continue to monitor

## 2021-06-18 NOTE — PLAN OF CARE
End of Shift Summary. See flowsheets for vital signs and detailed assessment.    Changes this shift: Tolerating NJ tube feeds at 40/hr. Ambulates with standby assist. Retroperitoneal drain with thick, serosanguinous output. Calorie counts to start at midnight. Received tylenol x2 for fever. Tmax 100.7. Lorazepam one time dose for severe anxiety, pt takes xanax at home.      Plan:  Monitor calorie counts.

## 2021-06-18 NOTE — CONSULTS
"  GENERAL ID SERVICE CONSULTATION     Patient:  Alexandra Nunez   Date of birth 1980, Medical record number [id number]  Date of Visit:  06/18/2021  Date of Admission: 6/13/2021  Consult Requester:Maia Long MD          Assessment and Recommendations:   Assessment:  1. polymicrobial abscess of enteric gayle (Strep anginosis, Eikenella, Enterobacter + yeast)  2. necrotic pancreatic pseudocyst  3. Normal renal function    Recommendations:  1. Agree with meropnem  2. Add micafungin for yeast (ordered), pending identification. If only Candida tropicalis (fluconazole sensitive) is identified, can switch on ?Sat or Sun to fluconazole 400mg/day.  3. Trend CRP (ordered). Will need a prolonged course of antibiotics. Follow CRP initially, then 2-3x per week with antibiotic duration until the CRP normalizes.  4. Consider tobramycin 5 mg/kg x 3 doses for synergy. (Optional)  If CRP is minimally changed tomorrow, I would recommend this more strongly.  If decompensates, this is also what I would consider.    Discussion    Thank you for this consult. ID will continue to follow.     Patient was discussed with Dr. Snider.     Leah Arroyo MD    ________________________________________________________________    Consult Question: \"Nec panc polymicrobial growth from fluid collection cultures, transition to oral antibiotics and duration\"    Admission Diagnosis: Pancreatitis [K85.90]         History of Present Illness:       Alexandra Nunez is a 40-year-old woman with a hx of gallstone pancreatitis s/p cholecystectomy w/ERCP 4/2021 c/b 5 cm peripancreatic fluid collection abutting the stomach necessitating NJ tube placement for tube feeding, recently admitted to [facility] for persistent abdominal pain with new concern for infected peripancreatic fluid collection. She is transferred here for possible drainage of this fluid collection.     Drainage of right pericolic gutter recommended by [facility]" [place]. Gram-positive bacteremia growing strep angionosus.      Symptomatically, Alexandra endorses periumbilical abdominal pain at about 6/10. At the outside hospital, she was getting pain control with liquid oxycodone which helped with pain. Pain is band like across abdomen. She had her last bowel movement yesterday, recently constipated and got an enema for it. No blood in stool. Had been getting continuous tube feeding tolerating decently. She was febrile up to 104 at [facility] which prompted starting vancomycin and meropenem for suspected infected peripancreatic fluid collection given nonremitting fevers also noted prior up to 102. She was last febrile before transfer she states. Blood cultures done apparently growing Strep angionosus. No current subjective fevers or chills.      Most recent imaging from 6/7 demonstrated a 6.3 cm fluid collection in the pancreas.  [facility] provider indicates that this patient was discussed in multidisciplinary conference today that included Dr. Freeman of GI panc/bili who suggested transfer for endoscopic management, drainage of R paracolic gutter.           Review of Systems:   CONSTITUTIONAL:  No fevers or chills  EYES: negative for icterus  ENT:  negative for hearing loss, tinnitus and sore throat  RESPIRATORY:  negative for cough with sputum and dyspnea  CARDIOVASCULAR:  negative for chest pain, dyspnea  GASTROINTESTINAL:  negative for nausea, vomiting, diarrhea and constipation  GENITOURINARY:  negative for dysuria  HEME:  No easy bruising  INTEGUMENT:  negative for rash and pruritus  NEURO:  Negative for headache         Past Medical History:   History reviewed. No pertinent past medical history.         Past Surgical History:   No past surgical history on file.         Family History:   Reviewed and non-contributory.   No family history on file.         Social History:     Social History     Tobacco Use     Smoking status: Not on file   Substance Use Topics      Alcohol use: Not on file     History   Sexual Activity     Sexual activity: Not on file            Current Medications:       heparin ANTICOAGULANT  5,000 Units Subcutaneous Q12H     meropenem  1 g Intravenous Q8H     micafungin  150 mg Intravenous Q24H     protein modular  2 packet Per Feeding Tube TID     sodium chloride (PF)  10 mL Irrigation Q8H     sodium chloride (PF)  3 mL Intracatheter Q8H            Allergies:     Allergies   Allergen Reactions     Nkda [No Known Drug Allergies]             Physical Exam:   Vitals were reviewed  Patient Vitals for the past 24 hrs:   BP Temp Temp src Pulse Resp SpO2 Weight   06/18/21 0912 -- -- -- -- -- -- 94.5 kg (208 lb 6.4 oz)   06/18/21 0729 129/84 98.3  F (36.8  C) Axillary 111 16 96 % --   06/18/21 0055 130/69 97.7  F (36.5  C) Oral 100 16 95 % --   06/18/21 0037 -- 96.5  F (35.8  C) Oral -- -- -- --   06/17/21 2250 -- 100.7  F (38.2  C) Oral -- -- -- --   06/17/21 2235 122/69 100.4  F (38  C) Oral 105 16 94 % --   06/17/21 2031 138/85 97.2  F (36.2  C) Oral 114 16 96 % --   06/17/21 1542 -- 100.3  F (37.9  C) Oral -- -- -- --   06/17/21 1538 116/78 100.1  F (37.8  C) Oral 114 16 94 % 98.4 kg (216 lb 14.9 oz)       Physical Examination:  GENERAL:  well-developed, well-nourished, in bed in no acute distress.   HEENT:  Head is normocephalic, atraumatic   EYES:  Eyes have anicteric sclerae without conjunctival injection or stigmata of endocarditis.    ENT:  Oropharynx is moist without exudates or ulcers. Tongue is midline  NECK:  Supple. No cervical lymphadenopathy  LUNGS:  Clear to auscultation bilateral.   CARDIOVASCULAR:  Regular rate and rhythm with no murmurs, gallops or rubs.  ABDOMEN:  Normal bowel sounds, soft, nontender. No appreciable hepatosplenomegaly  SKIN:  No acute rashes.  Line(s) are in place without any surrounding erythema or exudate. No stigmata of endocarditis.  NEUROLOGIC:  Grossly nonfocal. Active x4 extremities         Laboratory Data:      Inflammatory Markers    Recent Labs   Lab Test 06/18/21  0739 06/14/21  0249   .0* 120.0*       Hematology Studies    Recent Labs   Lab Test 06/18/21  0739 06/17/21  0736 06/16/21  0728 06/15/21  0633 06/14/21  0249   WBC 10.3 10.5 10.6 11.4* 10.3   ANEU 8.3 8.7* 8.0 8.9* 7.5   AEOS 0.5 0.4 0.7 0.5 0.5   HGB 7.6* 7.1* 7.3* 7.6* 7.7*   MCV 88 87 86 86 86   * 431 417 345 425       Metabolic Studies     Recent Labs   Lab Test 06/18/21  0739 06/17/21  0736 06/16/21  0728 06/15/21  0633 06/14/21  0249    136 132* 134 133   POTASSIUM 4.2 3.9 3.7 3.9 4.1   CHLORIDE 103 102 98 101 100   CO2 27 28 28 27 32   BUN 9 8 8 8 6*   CR 0.46* 0.53 0.49* 0.63 0.51*   GFRESTIMATED >90 >90 >90 >90 >90       Hepatic Studies    Recent Labs   Lab Test 06/18/21  0739 06/17/21  0736 06/16/21  0728 06/15/21  0633 06/14/21  0249   BILITOTAL 0.4 0.4 0.5 0.6 0.5   ALKPHOS 392* 92 66 59 61   ALBUMIN 2.0* 1.9* 1.9* 2.0* 2.0*   * 22 15 18 12   ALT 81* 14 11 10 14       Microbiology:  Culture Micro   Date Value Ref Range Status   06/15/2021 (A)  Preliminary    Heavy growth  beta hemolytic   Streptococcus anginosus  This organism is susceptible to ampicillin, penicillin, vancomycin and the cephalosporins.   If treatment is required AND your patient is allergic to penicillin, contact the   Microbiology Lab within 5 days to request susceptibility testing.     06/15/2021 Light growth  Enterobacter cloacae complex   (A)  Preliminary   06/15/2021 (A)  Preliminary    Heavy growth  Eikenella corrodens  Susceptibility testing in progress     06/15/2021 (A)  Preliminary    Light growth  Peyton tropicalis  Susceptibility testing not routinely done     06/15/2021 Light growth  Yeast  Identification to follow.   (A)  Preliminary   06/15/2021 Heavy growth  Mixed aerobic and anaerobic gayle   (A)  Final   06/15/2021 Candida tropicalis  isolated   (A)  Preliminary   06/14/2021 No growth after 4 days  Preliminary   06/14/2021 No  growth after 4 days  Preliminary           Attending Physician Attestation:  I, Jules Snider MD have seen and evaluated Alexandra Nunez. I have discussed with the primary provider team, and I agree with the above documented findings and plan in this note. I have reviewed today's vital signs, medications, labs and imaging.    Floor time: 30 minutes  Face-to-face time: 15 minutes  Total time: 45 minutes     Jules Snider MD MPH  Professor of Medicine  Division of Infectious Diseases & International Medicine  Department of Medicine

## 2021-06-18 NOTE — PLAN OF CARE
Vital signs:  Temp: 97.7  F (36.5  C) Temp src: Oral BP: 130/69 Pulse: 100   Resp: 16 SpO2: 95 % O2 Device: None (Room air)     Activity: Up with SBA.   Neuros: A&O x4. Anxious but pleasant.    Cardiac: WDL.   Respiratory: WDL on RA. Denies SOB.  GI/: Voiding spontaneously. +BS, 1 loose BM.   Diet: Regular, no appetite. TF @ 40 mL/hr via NJ, goal of 65, not advancing as pt could not tolerate yesterday.   Lines: Right triple PICC, one lumen TKO. IV ABX given per orders.   Incisions/Drains: Right drain irrigated per orders with scant output.   Pain/nausea: Denies.  Plan: Sergei counts starting today.

## 2021-06-19 LAB
ALBUMIN SERPL-MCNC: 2 G/DL (ref 3.4–5)
ALP SERPL-CCNC: 292 U/L (ref 40–150)
ALT SERPL W P-5'-P-CCNC: 50 U/L (ref 0–50)
ANION GAP SERPL CALCULATED.3IONS-SCNC: 3 MMOL/L (ref 3–14)
AST SERPL W P-5'-P-CCNC: 33 U/L (ref 0–45)
BASOPHILS # BLD AUTO: 0.1 10E9/L (ref 0–0.2)
BASOPHILS NFR BLD AUTO: 0.6 %
BILIRUB SERPL-MCNC: 0.3 MG/DL (ref 0.2–1.3)
BUN SERPL-MCNC: 12 MG/DL (ref 7–30)
CALCIUM SERPL-MCNC: 8.6 MG/DL (ref 8.5–10.1)
CHLORIDE SERPL-SCNC: 104 MMOL/L (ref 94–109)
CO2 SERPL-SCNC: 28 MMOL/L (ref 20–32)
CREAT SERPL-MCNC: 0.43 MG/DL (ref 0.52–1.04)
CRP SERPL-MCNC: 68 MG/L (ref 0–8)
DIFFERENTIAL METHOD BLD: ABNORMAL
EOSINOPHIL # BLD AUTO: 0.7 10E9/L (ref 0–0.7)
EOSINOPHIL NFR BLD AUTO: 8.8 %
ERYTHROCYTE [DISTWIDTH] IN BLOOD BY AUTOMATED COUNT: 16.1 % (ref 10–15)
GFR SERPL CREATININE-BSD FRML MDRD: >90 ML/MIN/{1.73_M2}
GLUCOSE SERPL-MCNC: 112 MG/DL (ref 70–99)
HCT VFR BLD AUTO: 27 % (ref 35–47)
HGB BLD-MCNC: 7.9 G/DL (ref 11.7–15.7)
IMM GRANULOCYTES # BLD: 0.1 10E9/L (ref 0–0.4)
IMM GRANULOCYTES NFR BLD: 1.1 %
LYMPHOCYTES # BLD AUTO: 1.1 10E9/L (ref 0.8–5.3)
LYMPHOCYTES NFR BLD AUTO: 13.6 %
MCH RBC QN AUTO: 25.9 PG (ref 26.5–33)
MCHC RBC AUTO-ENTMCNC: 29.3 G/DL (ref 31.5–36.5)
MCV RBC AUTO: 89 FL (ref 78–100)
MONOCYTES # BLD AUTO: 0.6 10E9/L (ref 0–1.3)
MONOCYTES NFR BLD AUTO: 6.7 %
NEUTROPHILS # BLD AUTO: 5.7 10E9/L (ref 1.6–8.3)
NEUTROPHILS NFR BLD AUTO: 69.2 %
NRBC # BLD AUTO: 0 10*3/UL
NRBC BLD AUTO-RTO: 0 /100
PLATELET # BLD AUTO: 536 10E9/L (ref 150–450)
POTASSIUM SERPL-SCNC: 4.6 MMOL/L (ref 3.4–5.3)
PROT SERPL-MCNC: 8 G/DL (ref 6.8–8.8)
RBC # BLD AUTO: 3.05 10E12/L (ref 3.8–5.2)
SODIUM SERPL-SCNC: 135 MMOL/L (ref 133–144)
WBC # BLD AUTO: 8.2 10E9/L (ref 4–11)

## 2021-06-19 PROCEDURE — 80053 COMPREHEN METABOLIC PANEL: CPT | Performed by: STUDENT IN AN ORGANIZED HEALTH CARE EDUCATION/TRAINING PROGRAM

## 2021-06-19 PROCEDURE — 120N000002 HC R&B MED SURG/OB UMMC

## 2021-06-19 PROCEDURE — 258N000003 HC RX IP 258 OP 636: Performed by: INTERNAL MEDICINE

## 2021-06-19 PROCEDURE — 99233 SBSQ HOSP IP/OBS HIGH 50: CPT | Mod: GC | Performed by: INTERNAL MEDICINE

## 2021-06-19 PROCEDURE — 85025 COMPLETE CBC W/AUTO DIFF WBC: CPT | Performed by: STUDENT IN AN ORGANIZED HEALTH CARE EDUCATION/TRAINING PROGRAM

## 2021-06-19 PROCEDURE — 250N000011 HC RX IP 250 OP 636: Performed by: INTERNAL MEDICINE

## 2021-06-19 PROCEDURE — 250N000011 HC RX IP 250 OP 636: Performed by: STUDENT IN AN ORGANIZED HEALTH CARE EDUCATION/TRAINING PROGRAM

## 2021-06-19 PROCEDURE — 86140 C-REACTIVE PROTEIN: CPT | Performed by: STUDENT IN AN ORGANIZED HEALTH CARE EDUCATION/TRAINING PROGRAM

## 2021-06-19 PROCEDURE — 99232 SBSQ HOSP IP/OBS MODERATE 35: CPT | Performed by: INTERNAL MEDICINE

## 2021-06-19 PROCEDURE — 250N000013 HC RX MED GY IP 250 OP 250 PS 637: Performed by: STUDENT IN AN ORGANIZED HEALTH CARE EDUCATION/TRAINING PROGRAM

## 2021-06-19 PROCEDURE — 250N000013 HC RX MED GY IP 250 OP 250 PS 637: Performed by: INTERNAL MEDICINE

## 2021-06-19 PROCEDURE — 36592 COLLECT BLOOD FROM PICC: CPT | Performed by: STUDENT IN AN ORGANIZED HEALTH CARE EDUCATION/TRAINING PROGRAM

## 2021-06-19 RX ORDER — ALPRAZOLAM 0.25 MG
0.5 TABLET ORAL 2 TIMES DAILY PRN
Status: DISCONTINUED | OUTPATIENT
Start: 2021-06-19 | End: 2021-06-23 | Stop reason: HOSPADM

## 2021-06-19 RX ADMIN — HEPARIN SODIUM 5000 UNITS: 5000 INJECTION, SOLUTION INTRAVENOUS; SUBCUTANEOUS at 20:56

## 2021-06-19 RX ADMIN — ACETAMINOPHEN 650 MG: 325 TABLET, FILM COATED ORAL at 17:12

## 2021-06-19 RX ADMIN — MEROPENEM 1 G: 1 INJECTION, POWDER, FOR SOLUTION INTRAVENOUS at 08:01

## 2021-06-19 RX ADMIN — Medication 2 PACKET: at 20:57

## 2021-06-19 RX ADMIN — HEPARIN SODIUM 5000 UNITS: 5000 INJECTION, SOLUTION INTRAVENOUS; SUBCUTANEOUS at 08:01

## 2021-06-19 RX ADMIN — MICAFUNGIN SODIUM 150 MG: 50 INJECTION, POWDER, LYOPHILIZED, FOR SOLUTION INTRAVENOUS at 11:24

## 2021-06-19 RX ADMIN — HEPARIN SODIUM 5000 UNITS: 5000 INJECTION, SOLUTION INTRAVENOUS; SUBCUTANEOUS at 20:55

## 2021-06-19 RX ADMIN — MEROPENEM 1 G: 1 INJECTION, POWDER, FOR SOLUTION INTRAVENOUS at 17:07

## 2021-06-19 RX ADMIN — OXYCODONE HYDROCHLORIDE 5 MG: 5 SOLUTION ORAL at 01:11

## 2021-06-19 RX ADMIN — TOBRAMYCIN 400 MG: 40 INJECTION INTRAMUSCULAR; INTRAVENOUS at 12:26

## 2021-06-19 RX ADMIN — ALPRAZOLAM 0.5 MG: 0.25 TABLET ORAL at 20:54

## 2021-06-19 RX ADMIN — ACETAMINOPHEN 650 MG: 325 TABLET, FILM COATED ORAL at 05:21

## 2021-06-19 RX ADMIN — ACETAMINOPHEN 650 MG: 325 TABLET, FILM COATED ORAL at 20:55

## 2021-06-19 RX ADMIN — ALPRAZOLAM 0.5 MG: 0.25 TABLET ORAL at 12:41

## 2021-06-19 RX ADMIN — ONDANSETRON 4 MG: 2 INJECTION INTRAMUSCULAR; INTRAVENOUS at 20:59

## 2021-06-19 RX ADMIN — OXYCODONE HYDROCHLORIDE 5 MG: 5 SOLUTION ORAL at 20:57

## 2021-06-19 RX ADMIN — Medication 1 MG: at 20:54

## 2021-06-19 ASSESSMENT — ACTIVITIES OF DAILY LIVING (ADL)
ADLS_ACUITY_SCORE: 18

## 2021-06-19 ASSESSMENT — MIFFLIN-ST. JEOR: SCORE: 1754

## 2021-06-19 NOTE — PROGRESS NOTES
[facility], [place]     Daily Progress Note - [facility] Service        Date of service: 06/14/21      Assessment & Plan  Alexandra Nunez is a 40-year-old woman with history of gallstone pancreatitis s/p cholecystectomy w/ ERCP 4/2021 c/b peripancreatic fluid collection abutting the stomach necessitating NJ tube placement, who was recently admitted to [facility] for persistent abdominal pain with new concern for infected peripancreatic fluid collection. She is transferred here for further management and is now s/p RP drain placement on 6/15.    Changes Today:  - 6/15 RP collection cx -> strep anginosus, enterobacter cloacae, eikenella corrodens, madison tropicalis  - 6/17 was switched back from Zosyn -> Meropenem given enterobacter growth and c/f ampc inducible resistance  - Given polymicrobial cx, unclear duration of abx -> consult ID, appreciate recs  - Pt appetite slowly improving, reports tolerating mashed potatoes, eggs for breakfast, calorie counts ongoing  - CT abd/pelvis 6/17 -> improvement in fluid collection size    Sepsis 2/2 acute necrotizing pancreatitis with infected walled off necrotic collections  S/p RP drain placement (6/15)  Hx gallstone pancreatitis s/p cholecystectomy & ERCP (4/2021)  Initially presented with gallstone pancreatitis in 4/2021, underwent lap tracey on 4/28 and ERCP on 4/29. Recovery c/b development of acute enrrique-pancreatic fluid collections and inability to tolerate PO intake and required NJT placement 5/18 with tube feeds at home. Admitted with increased abdominal pain and fevers now s/p RP drain placed into necrotic peripancreatic fluid collection.  - GI panc/bili following, appreciate recs  - 6/15 RP collection cx -> strep anginosus, enterobacter cloacae, eikenella corrodens, madison tropicalis  - 6/17 was switched back from Zosyn -> Meropenem given enterobacter growth and c/f ampc inducible resistance, defer further antimicrobial therapy to  ID  - ID consulted 6/18, appreciate recs   - Pain control: tylenol prn, liquid form transitioned to PO per RD recs d/t c/f osmotic diarrhea   - Anti-emetics prn  - ADAT, will assess for ability to remove NJ prior to discharge  - CT abd/pelvis 6/17 -> improvement in fluid collection size    Severe malnutrition in the context of acute on chronic illness  Based on: weight loss, moderate/severe subcutaneous fat loss, moderate/severe muscle loss. NJ in place due to inability to tolerate PO.  - Nutrition consult  - TF per dietician orders  - ADAT, will monitor over next 2-3 days and discuss plan for removing NJ vs continuing vs PEG/J  - Calorie counts ongoing     Chronic problems:  2nd-degree AV block c/b syncopal episodes s/p pacemaker: [employer] pacemaker placed when she was 14, has had known non-functioning atrial lead for years.   Anxiety: Was intermittently taking xanax at home. Monitor symptoms here.  Mild intermittent asthma: Albuterol prn.      Diet: TF, trial advancing diet  DVT Prophylaxis: SubQ heparin  Code Status: Full code    Disposition:  Expected discharge: 4 - 7 days, recommended to prior living arrangement once adequate pain management/ tolerating PO medications, antibiotic plan established and nutrition plan in place.      Maia Long MD  [facility] Hospitalist  Text Page    _____________________________________________________________________    Interval History  Nursing notes reviewed. No acute events overnight. Fever curve downtrending, Tmax 100.7 in last 24 hours. Patient states she was able to eat some mashed potatoes for dinner with minimal bloating and nauesa, and tried some scrambled eggs this morning and feels ok so far. Is somewhat tearful this am due to inability to eat more, lack of appetite, still being in the hospital. Denies fevers, chills. Occasional nausea when full, decreased appetite overall, no diarrhea.     4-point ROS negative unless mentioned above.    Physical Exam  /80 (BP  Location: Left arm)   Pulse 108   Temp 99.1  F (37.3  C) (Axillary)   Resp 18   Ht 1.829 m (6')   Wt 94.5 kg (208 lb 6.4 oz)   SpO2 96%   BMI 28.26 kg/m    208 lbs 6.4 oz    Constitutional: awake, alert, cooperative, no acute distress  Respiratory: breathing comfortably on room air  Cardiovascular: tachycardic, regular  GI: soft, non-distended, NJ in place, RP drain in place with red serosanguinous output  Skin: normal skin color, texture, turgor  Musculoskeletal: no lower extremity edema present  Neurologic: alert and oriented x3, CNs II-XII grossly intact, moving all extremities equally      Labs and Imaging  All labs and imaging reviewed. .

## 2021-06-19 NOTE — PHARMACY-AMINOGLYCOSIDE DOSING SERVICE
Pharmacy Aminoglycoside Initial Note  Date of Service 2021  Patient's  1980  40 year old, female    Weight (Adjusted):  83.1 kg    Indication: Abscess - synergy    Current estimated CrCl = Estimated Creatinine Clearance: 223.6 mL/min (A) (based on SCr of 0.43 mg/dL (L)).    Creatinine for last 3 days  2021:  7:36 AM Creatinine 0.53 mg/dL  2021:  7:39 AM Creatinine 0.46 mg/dL  2021:  9:04 AM Creatinine 0.43 mg/dL     Nephrotoxins and other renal medications (From now, onward)    Start     Dose/Rate Route Frequency Ordered Stop    21 1130  tobramycin (NEBCIN) 400 mg in sodium chloride 0.9 % intermittent infusion      5 mg/kg × 83.1 kg (Adjusted)  over 60 Minutes Intravenous EVERY 24 HOURS 21 1106 21 1129          Contrast Orders - past 72 hours (72h ago, onward)    Start     Dose/Rate Route Frequency Ordered Stop    21 1200  iopamidol (ISOVUE-370) solution 131 mL      131 mL Intravenous ONCE 21 1145 21 1213          Aminoglycoside Levels - past 2 days  No results found for requested labs within last 48 hours.    Aminoglycosides IV Administrations (past 72 hours)      No aminoglycosides orders with administrations in past 72 hours.                    Plan:  1.  Start tobramycin 400 mg (5 mg/kg) IV q24h per ID rec.   2.  Target goals based on extended interval dosing  3.  Goal peak level: 15-24 mg/L  4.  Goal trough level: <0.5mg/L  5.  No levels are needed since therapy is only intended for 3 doses. Will check levels in 3 days if course is extended.    Kathy Glover, PharmD, BCPS  2021 11:10 AM

## 2021-06-19 NOTE — PROGRESS NOTES
Calorie Count  Intake recorded for: 6/18  Total Kcals: 375 Total Protein: 17g  Kcals from Hospital Food: 375   Protein: 17g  Kcals from Outside Food (average):0 Protein: 0g  # Meals Ordered from Kitchen: 3  # Meals Recorded: 2    First: 75% ice cream, 50% eggs   Second: 100% lemon ice, 25% chicken and cheese quesadilla  # Supplements Recorded: 0

## 2021-06-19 NOTE — PROGRESS NOTES
GENERAL ID SERVICE Progress Note     Patient:  Alexandra Nunez   Date of birth 1980, Medical record number [id number]  Date of Visit:  06/19/2021  Date of Admission: 6/13/2021  Consult Requester:Maia Long MD          Assessment and Recommendations:   Assessment:  1. polymicrobial abscess of enteric gayle (Strep anginosis, Eikenella, Enterobacter + yeast). Yeast are not identified as yet.  2. necrotic pancreatic pseudocyst  3. Normal renal function    Recommendations:  1. Continue current antibiotics  2. Trend CRP  3. Consider tobramycin 5 mg/kg x 3 doses for synergy. (Optional)  If decompensates, this is also what I would consider.    ID will follow, awaiting finalization of cultures    [place]  [id number]         History of Present Illness:       Alexandra Nunez is a 40-year-old woman with a hx of gallstone pancreatitis s/p cholecystectomy w/ERCP 4/2021 c/b 5 cm peripancreatic fluid collection abutting the stomach necessitating NJ tube placement for tube feeding, recently admitted to [facility] for persistent abdominal pain with new concern for infected peripancreatic fluid collection. She is transferred here for possible drainage of this fluid collection.     Drainage of right pericolic gutter recommended by [facility]. Gram-positive bacteremia growing strep angionosus.      Symptomatically, Alexandra endorses periumbilical abdominal pain at about 6/10. At the outside hospital, she was getting pain control with liquid oxycodone which helped with pain. Pain is band like across abdomen. She had her last bowel movement yesterday, recently constipated and got an enema for it. No blood in stool. Had been getting continuous tube feeding tolerating decently. She was febrile up to 104 at [place] which prompted starting vancomycin and meropenem for suspected infected peripancreatic fluid collection given nonremitting fevers also noted prior up to 102. She was last febrile before transfer  she states. Blood cultures done apparently growing Strep angionosus. No current subjective fevers or chills.      Most recent imaging from 6/7 demonstrated a 6.3 cm fluid collection in the pancreas.  [facility] provider indicates that this patient was discussed in multidisciplinary conference today that included Dr. Freeman of GI panc/bili who suggested transfer for endoscopic management, drainage of R paracolic gutter.           Review of Systems:   CONSTITUTIONAL:  No fevers or chills  EYES: negative for icterus  ENT:  negative for hearing loss, tinnitus and sore throat  RESPIRATORY:  negative for cough with sputum and dyspnea  CARDIOVASCULAR:  negative for chest pain, dyspnea  GASTROINTESTINAL:  negative for nausea, vomiting, diarrhea and constipation  GENITOURINARY:  negative for dysuria  HEME:  No easy bruising  INTEGUMENT:  negative for rash and pruritus  NEURO:  Negative for headache           Physical Exam:   Vitals were reviewed  Patient Vitals for the past 24 hrs:   BP Temp Temp src Pulse Resp SpO2 Weight   06/19/21 0800 126/81 99  F (37.2  C) Oral 99 20 96 % --   06/19/21 0641 -- 99  F (37.2  C) Oral -- -- -- --   06/19/21 0500 132/88 100.6  F (38.1  C) Oral 110 16 -- --   06/19/21 0130 132/83 100.2  F (37.9  C) Axillary 107 18 96 % --   06/18/21 2233 125/82 97.9  F (36.6  C) Oral 102 18 92 % 98.2 kg (216 lb 7.9 oz)   06/18/21 1941 130/80 99.1  F (37.3  C) Axillary 108 18 96 % --   06/18/21 1544 139/89 98.8  F (37.1  C) -- 110 16 95 % --       Physical Examination:  GENERAL:  well-developed, well-nourished, in bed in no acute distress.   HEENT:  Head is normocephalic, atraumatic   EYES:  Eyes have anicteric sclerae without conjunctival injection or stigmata of endocarditis.    ENT:  Oropharynx is moist without exudates or ulcers. Tongue is midline  ABDOMEN:  Normal bowel sounds, soft, nontender. No appreciable hepatosplenomegaly. Drain in place, draining brownish liquid           Laboratory Data:      Inflammatory Markers    Recent Labs   Lab Test 06/19/21  0904 06/18/21  0739 06/14/21  0249   CRP 68.0* 110.0* 120.0*       Hematology Studies    Recent Labs   Lab Test 06/19/21  0904 06/18/21  0739 06/17/21  0736 06/16/21  0728 06/15/21  0633 06/14/21  0249   WBC 8.2 10.3 10.5 10.6 11.4* 10.3   ANEU 5.7 8.3 8.7* 8.0 8.9* 7.5   AEOS 0.7 0.5 0.4 0.7 0.5 0.5   HGB 7.9* 7.6* 7.1* 7.3* 7.6* 7.7*   MCV 89 88 87 86 86 86   * 501* 431 417 345 425       Metabolic Studies     Recent Labs   Lab Test 06/19/21  0904 06/18/21  0739 06/17/21  0736 06/16/21  0728 06/15/21  0633    136 136 132* 134   POTASSIUM 4.6 4.2 3.9 3.7 3.9   CHLORIDE 104 103 102 98 101   CO2 28 27 28 28 27   BUN 12 9 8 8 8   CR 0.43* 0.46* 0.53 0.49* 0.63   GFRESTIMATED >90 >90 >90 >90 >90       Hepatic Studies    Recent Labs   Lab Test 06/19/21  0904 06/18/21  0739 06/17/21  0736 06/16/21  0728 06/15/21  0633 06/14/21  0249   BILITOTAL 0.3 0.4 0.4 0.5 0.6 0.5   ALKPHOS 292* 392* 92 66 59 61   ALBUMIN 2.0* 2.0* 1.9* 1.9* 2.0* 2.0*   AST 33 102* 22 15 18 12   ALT 50 81* 14 11 10 14       Microbiology:  Culture Micro   Date Value Ref Range Status   06/15/2021 (A)  Preliminary    Heavy growth  beta hemolytic   Streptococcus anginosus  This organism is susceptible to ampicillin, penicillin, vancomycin and the cephalosporins.   If treatment is required AND your patient is allergic to penicillin, contact the   Microbiology Lab within 5 days to request susceptibility testing.     06/15/2021 Light growth  Enterobacter cloacae complex   (A)  Preliminary   06/15/2021 Heavy growth  Eikenella corrodens   (A)  Preliminary   06/15/2021 (A)  Preliminary    Light growth  Peyton tropicalis  Susceptibility testing not routinely done     06/15/2021 Light growth  Yeast  Identification to follow.   (A)  Preliminary   06/15/2021 Heavy growth  Mixed aerobic and anaerobic gayle   (A)  Final   06/15/2021 Candida tropicalis  isolated   (A)  Preliminary   06/14/2021  No growth after 5 days  Preliminary   06/14/2021 No growth after 5 days  Preliminary

## 2021-06-19 NOTE — PROGRESS NOTES
[facility] [place]     Daily Progress Note - [facility] Service        Date of service: 06/14/21      Assessment & Plan  Alexandra Nunez is a 40-year-old woman with history of gallstone pancreatitis s/p cholecystectomy w/ ERCP 4/2021 c/b peripancreatic fluid collection abutting the stomach necessitating NJ tube placement, who was recently admitted to [facility] for persistent abdominal pain with new concern for infected peripancreatic fluid collection. She was transferred here for further management and is now s/p RP drain placement on 6/15.    Changes Today:  - Pharmacy consult to dose tobramycin, started for synergy given ongoing fevers  - Resumed patient's pta xanax  - Continue to advance diet and eat as much as tolerated, calorie counts    Sepsis 2/2 acute necrotizing pancreatitis with infected walled off necrotic collections  S/p RP drain placement (6/15)  Hx gallstone pancreatitis s/p cholecystectomy & ERCP (4/2021)  Initially presented with gallstone pancreatitis in 4/2021, underwent lap tracey on 4/28 and ERCP on 4/29. Recovery c/b development of acute enrrique-pancreatic fluid collections and inability to tolerate PO intake and required NJT placement 5/18 with tube feeds at home. Admitted with increased abdominal pain and fevers now s/p RP drain placed into necrotic peripancreatic fluid collection.  - GI panc/bili following, appreciate recs  - 6/15 RP collection cx -> strep anginosus, enterobacter cloacae, eikenella corrodens, [place] tropicalis  - 6/17 was switched back from Zosyn -> Meropenem given enterobacter growth and c/f ampc inducible resistance  - ID consulted 6/18, appreciate recs   - Pain control: tylenol prn, oxy available though patient uses sparingly  - Anti-emetics prn  - CT abd/pelvis 6/17 -> improvement in fluid collection size  - Pharmacy consult to dose tobramycin, 3-dose course for synergy given patient's ongoing fevers    Severe malnutrition in the context  of acute on chronic illness  Based on: weight loss, moderate/severe subcutaneous fat loss, moderate/severe muscle loss. NJ in place due to inability to tolerate PO.  - Nutrition consult  - TF per dietician orders  - Calorie counts ongoing  - Will need to make decision regarding NJ vs removal vs PEG/J based on intake over the weekend    Chronic problems:  2nd-degree AV block c/b syncopal episodes s/p pacemaker: [employer] pacemaker placed when she was 14, has had known non-functioning atrial lead for years.   Anxiety: Was intermittently taking xanax at home. Monitor symptoms here.  Mild intermittent asthma: Albuterol prn.      Diet: TF, Regular diet  DVT Prophylaxis: SubQ heparin  Code Status: Full code    Disposition:  Expected discharge: 4 - 7 days, recommended to prior living arrangement once adequate pain management/ tolerating PO medications, antibiotic plan established and nutrition plan in place.    Patient was staffed with attending Dr. Long.    Amy Glaser MD  Internal Medicine, PGY-2    _____________________________________________________________________    Interval History  Nursing notes reviewed. No acute events overnight. Overall fever curve is improved, but patient still spiked fever to 100.6 F overnight. Reports that she felt warm as if she had a fever, but that it was nothing compared to the chills that she was feeling with her fevers on admission. She felt like she ate soft things better yesterday, but that she has trouble swallowing solid food around the tube. Did have one episode of emesis yesterday, but she attributes it to drinking a berry-flavored ensure that she didn't like and didn't sit well. Otherwise tolerated what she ate/drank. Diarrhea initially seemed to improve some with changing the tylenol from liquid to tablet, though she still reports having some. Pain is well-controlled. Had one episode of more severe pain when she shifted in bed and it pulled the drain, otherwise very  tolerable.    4-point ROS negative unless mentioned above.    Physical Exam  /88 (BP Location: Left arm)   Pulse 110   Temp 99  F (37.2  C) (Oral)   Resp 16   Ht 1.829 m (6')   Wt 98.2 kg (216 lb 7.9 oz)   SpO2 96%   BMI 29.36 kg/m    216 lbs 7.87 oz    Constitutional: awake, alert, cooperative, no acute distress  Respiratory: breathing comfortably on room air  Cardiovascular: tachycardic, regular  GI: soft, non-distended, NJ in place, RP drain in place with red serosanguinous output  Skin: normal skin color, texture, turgor  Musculoskeletal: no lower extremity edema present  Neurologic: alert and oriented x3, CNs II-XII grossly intact, moving all extremities equally      Labs and Imaging  All labs and imaging reviewed.

## 2021-06-19 NOTE — PLAN OF CARE
Major Shift Events:  No acute events overnight. A&O x4. Intermittent right chest/flank pain throughout shift. 5 mg PRN Oxy given x2 with good effect.  VSS. T-max of 100.6. On room air. 1 unmeasured urine occurrence. Intermittent nausea overnight, no PRN's given. Tolerating TF at 60 ml/hr, scheduled to increase to 65 at 0600.  Plan: Advance feeds to goal. Continue POC and monitor for any acute changes.   For vital signs and complete assessments, please see documentation flowsheets.

## 2021-06-19 NOTE — PLAN OF CARE
Patient denies pain,requested Xanax for feelings of anxiety.Tolerated t-fdg 65ml/hour.water flushes.Ad shemar in room,gait steady.States appetite small,with current t-fdg.Voiding,no stool episodes.CRP level less then previous day.Continue to monitor

## 2021-06-20 LAB
ALBUMIN SERPL-MCNC: 2.2 G/DL (ref 3.4–5)
ALP SERPL-CCNC: 233 U/L (ref 40–150)
ALT SERPL W P-5'-P-CCNC: 38 U/L (ref 0–50)
ANION GAP SERPL CALCULATED.3IONS-SCNC: 4 MMOL/L (ref 3–14)
AST SERPL W P-5'-P-CCNC: 23 U/L (ref 0–45)
BACTERIA SPEC CULT: ABNORMAL
BACTERIA SPEC CULT: NO GROWTH
BACTERIA SPEC CULT: NO GROWTH
BASOPHILS # BLD AUTO: 0.1 10E9/L (ref 0–0.2)
BASOPHILS NFR BLD AUTO: 0.5 %
BILIRUB SERPL-MCNC: 0.6 MG/DL (ref 0.2–1.3)
BUN SERPL-MCNC: 13 MG/DL (ref 7–30)
CALCIUM SERPL-MCNC: 8.8 MG/DL (ref 8.5–10.1)
CHLORIDE SERPL-SCNC: 101 MMOL/L (ref 94–109)
CO2 SERPL-SCNC: 27 MMOL/L (ref 20–32)
CREAT SERPL-MCNC: 0.46 MG/DL (ref 0.52–1.04)
CRP SERPL-MCNC: 47 MG/L (ref 0–8)
DIFFERENTIAL METHOD BLD: ABNORMAL
EOSINOPHIL # BLD AUTO: 0.6 10E9/L (ref 0–0.7)
EOSINOPHIL NFR BLD AUTO: 6.9 %
ERYTHROCYTE [DISTWIDTH] IN BLOOD BY AUTOMATED COUNT: 16.2 % (ref 10–15)
GFR SERPL CREATININE-BSD FRML MDRD: >90 ML/MIN/{1.73_M2}
GLUCOSE SERPL-MCNC: 111 MG/DL (ref 70–99)
HCT VFR BLD AUTO: 28.2 % (ref 35–47)
HGB BLD-MCNC: 8.2 G/DL (ref 11.7–15.7)
IMM GRANULOCYTES # BLD: 0.1 10E9/L (ref 0–0.4)
IMM GRANULOCYTES NFR BLD: 0.9 %
LYMPHOCYTES # BLD AUTO: 1 10E9/L (ref 0.8–5.3)
LYMPHOCYTES NFR BLD AUTO: 10.8 %
MAGNESIUM SERPL-MCNC: 2.1 MG/DL (ref 1.6–2.3)
MCH RBC QN AUTO: 25.6 PG (ref 26.5–33)
MCHC RBC AUTO-ENTMCNC: 29.1 G/DL (ref 31.5–36.5)
MCV RBC AUTO: 88 FL (ref 78–100)
MONOCYTES # BLD AUTO: 0.5 10E9/L (ref 0–1.3)
MONOCYTES NFR BLD AUTO: 5.6 %
NEUTROPHILS # BLD AUTO: 7 10E9/L (ref 1.6–8.3)
NEUTROPHILS NFR BLD AUTO: 75.3 %
NRBC # BLD AUTO: 0 10*3/UL
NRBC BLD AUTO-RTO: 0 /100
PLATELET # BLD AUTO: 558 10E9/L (ref 150–450)
POTASSIUM SERPL-SCNC: 4.4 MMOL/L (ref 3.4–5.3)
PROT SERPL-MCNC: 8.4 G/DL (ref 6.8–8.8)
RBC # BLD AUTO: 3.2 10E12/L (ref 3.8–5.2)
SODIUM SERPL-SCNC: 133 MMOL/L (ref 133–144)
SPECIMEN SOURCE: ABNORMAL
SPECIMEN SOURCE: NORMAL
SPECIMEN SOURCE: NORMAL
WBC # BLD AUTO: 9.2 10E9/L (ref 4–11)

## 2021-06-20 PROCEDURE — 250N000011 HC RX IP 250 OP 636: Performed by: STUDENT IN AN ORGANIZED HEALTH CARE EDUCATION/TRAINING PROGRAM

## 2021-06-20 PROCEDURE — 86140 C-REACTIVE PROTEIN: CPT | Performed by: STUDENT IN AN ORGANIZED HEALTH CARE EDUCATION/TRAINING PROGRAM

## 2021-06-20 PROCEDURE — 99233 SBSQ HOSP IP/OBS HIGH 50: CPT | Performed by: INTERNAL MEDICINE

## 2021-06-20 PROCEDURE — 80053 COMPREHEN METABOLIC PANEL: CPT | Performed by: STUDENT IN AN ORGANIZED HEALTH CARE EDUCATION/TRAINING PROGRAM

## 2021-06-20 PROCEDURE — 36592 COLLECT BLOOD FROM PICC: CPT | Performed by: STUDENT IN AN ORGANIZED HEALTH CARE EDUCATION/TRAINING PROGRAM

## 2021-06-20 PROCEDURE — 250N000013 HC RX MED GY IP 250 OP 250 PS 637: Performed by: INTERNAL MEDICINE

## 2021-06-20 PROCEDURE — 83735 ASSAY OF MAGNESIUM: CPT | Performed by: INTERNAL MEDICINE

## 2021-06-20 PROCEDURE — 258N000003 HC RX IP 258 OP 636: Performed by: INTERNAL MEDICINE

## 2021-06-20 PROCEDURE — 250N000013 HC RX MED GY IP 250 OP 250 PS 637: Performed by: STUDENT IN AN ORGANIZED HEALTH CARE EDUCATION/TRAINING PROGRAM

## 2021-06-20 PROCEDURE — 250N000011 HC RX IP 250 OP 636: Performed by: INTERNAL MEDICINE

## 2021-06-20 PROCEDURE — 85025 COMPLETE CBC W/AUTO DIFF WBC: CPT | Performed by: STUDENT IN AN ORGANIZED HEALTH CARE EDUCATION/TRAINING PROGRAM

## 2021-06-20 PROCEDURE — 82656 EL-1 FECAL QUAL/SEMIQ: CPT | Performed by: STUDENT IN AN ORGANIZED HEALTH CARE EDUCATION/TRAINING PROGRAM

## 2021-06-20 PROCEDURE — 120N000002 HC R&B MED SURG/OB UMMC

## 2021-06-20 PROCEDURE — 36592 COLLECT BLOOD FROM PICC: CPT | Performed by: INTERNAL MEDICINE

## 2021-06-20 RX ORDER — FLUCONAZOLE 200 MG/1
400 TABLET ORAL DAILY
Status: DISCONTINUED | OUTPATIENT
Start: 2021-06-20 | End: 2021-06-23 | Stop reason: HOSPADM

## 2021-06-20 RX ORDER — ERTAPENEM 1 G/1
1 INJECTION, POWDER, LYOPHILIZED, FOR SOLUTION INTRAMUSCULAR; INTRAVENOUS EVERY 24 HOURS
Status: DISCONTINUED | OUTPATIENT
Start: 2021-06-20 | End: 2021-06-23 | Stop reason: HOSPADM

## 2021-06-20 RX ADMIN — HEPARIN SODIUM 5000 UNITS: 5000 INJECTION, SOLUTION INTRAVENOUS; SUBCUTANEOUS at 08:05

## 2021-06-20 RX ADMIN — ALPRAZOLAM 0.5 MG: 0.25 TABLET ORAL at 15:01

## 2021-06-20 RX ADMIN — TOBRAMYCIN 400 MG: 40 INJECTION INTRAMUSCULAR; INTRAVENOUS at 11:23

## 2021-06-20 RX ADMIN — FLUCONAZOLE 400 MG: 200 TABLET ORAL at 14:56

## 2021-06-20 RX ADMIN — Medication 2 PACKET: at 21:10

## 2021-06-20 RX ADMIN — MICAFUNGIN SODIUM 150 MG: 50 INJECTION, POWDER, LYOPHILIZED, FOR SOLUTION INTRAVENOUS at 10:00

## 2021-06-20 RX ADMIN — Medication 1 MG: at 21:07

## 2021-06-20 RX ADMIN — ONDANSETRON 4 MG: 2 INJECTION INTRAMUSCULAR; INTRAVENOUS at 20:57

## 2021-06-20 RX ADMIN — ALPRAZOLAM 0.5 MG: 0.25 TABLET ORAL at 21:07

## 2021-06-20 RX ADMIN — OXYCODONE HYDROCHLORIDE 10 MG: 5 SOLUTION ORAL at 21:08

## 2021-06-20 RX ADMIN — MEROPENEM 1 G: 1 INJECTION, POWDER, FOR SOLUTION INTRAVENOUS at 09:00

## 2021-06-20 RX ADMIN — ACETAMINOPHEN 650 MG: 325 TABLET, FILM COATED ORAL at 21:08

## 2021-06-20 RX ADMIN — ERTAPENEM SODIUM 1 G: 1 INJECTION, POWDER, LYOPHILIZED, FOR SOLUTION INTRAMUSCULAR; INTRAVENOUS at 15:48

## 2021-06-20 RX ADMIN — Medication 2 PACKET: at 08:11

## 2021-06-20 RX ADMIN — LOPERAMIDE HYDROCHLORIDE 2 MG: 2 CAPSULE ORAL at 21:08

## 2021-06-20 RX ADMIN — MEROPENEM 1 G: 1 INJECTION, POWDER, FOR SOLUTION INTRAVENOUS at 00:20

## 2021-06-20 ASSESSMENT — ACTIVITIES OF DAILY LIVING (ADL)
ADLS_ACUITY_SCORE: 18

## 2021-06-20 NOTE — PROGRESS NOTES
GENERAL ID SERVICE Progress Note     Patient:  Alexandra Nunez   Date of birth 1980, Medical record number [id number]  Date of Visit:  06/20/2021  Date of Admission: 6/13/2021  Consult Requester:Maia Long MD          Assessment and Recommendations:   Assessment:  1. polymicrobial abscess of enteric gayle (Strep anginosis, Eikenella, Enterobacter + two Candida species (both fluconazole sensitive)  2. necrotic pancreatic pseudocyst  3. Normal renal function    Recommendations:  1. Change meropenem to ertapenem 1g IV Q24Hr (ordered)  2. Start fluconazole 400mg PO QDay (ordered)  3. Discontinue micafungin after Tuesday (Fluconazole does take some time to get to steady state). (ordered)  4. Trend CRP over next 48-72 hours. If continues to decline then should do well on ertapenem + fluconazole.     Plan for 3 weeks of antibiotics, check CBC, Creatinine, CRP 2x per week as outpatient. ID follow up in 2-3 weeks before antibiotics finish.    [facility]  [id number]         History of Present Illness:       Alexandra Nunez is a 40-year-old woman with a hx of gallstone pancreatitis s/p cholecystectomy w/ERCP 4/2021 c/b 5 cm peripancreatic fluid collection abutting the stomach necessitating NJ tube placement for tube feeding, recently admitted to [facility] for persistent abdominal pain with new concern for infected peripancreatic fluid collection. She is transferred here for possible drainage of this fluid collection.     Drainage of right pericolic gutter recommended by [facility]. Gram-positive bacteremia growing strep angionosus.      Symptomatically, Alexandra endorses periumbilical abdominal pain at about 6/10. At the outside hospital, she was getting pain control with liquid oxycodone which helped with pain. Pain is band like across abdomen. She had her last bowel movement yesterday, recently constipated and got an enema for it. No blood in stool. Had been getting continuous tube feeding  tolerating decently. She was febrile up to 104 at [place] which prompted starting vancomycin and meropenem for suspected infected peripancreatic fluid collection given nonremitting fevers also noted prior up to 102. She was last febrile before transfer she states. Blood cultures done apparently growing Strep angionosus. No current subjective fevers or chills.      Most recent imaging from 6/7 demonstrated a 6.3 cm fluid collection in the pancreas.  [facility] provider indicates that this patient was discussed in multidisciplinary conference today that included Dr. Freeman of GI panc/bili who suggested transfer for endoscopic management, drainage of R paracolic gutter.           Review of Systems:   CONSTITUTIONAL:  No fevers or chills  EYES: negative for icterus  ENT:  negative for hearing loss, tinnitus and sore throat  RESPIRATORY:  negative for cough with sputum and dyspnea  CARDIOVASCULAR:  negative for chest pain, dyspnea  GASTROINTESTINAL:  negative for nausea, vomiting, diarrhea and constipation  GENITOURINARY:  negative for dysuria  HEME:  No easy bruising  INTEGUMENT:  negative for rash and pruritus  NEURO:  Negative for headache           Physical Exam:   Vitals were reviewed  Patient Vitals for the past 24 hrs:   BP Temp Temp src Pulse Resp SpO2 Weight   06/20/21 0905 130/82 98.2  F (36.8  C) Oral 96 18 96 % --   06/20/21 0645 124/80 98.4  F (36.9  C) Oral 95 18 97 % --   06/19/21 2240 126/81 98.4  F (36.9  C) Oral 95 17 96 % --   06/19/21 2000 134/84 96.1  F (35.6  C) Oral 103 18 97 % --   06/19/21 1712 -- 100.6  F (38.1  C) -- -- -- -- --   06/19/21 1644 132/85 100.6  F (38.1  C) Axillary 115 17 97 % 98.2 kg (216 lb 7.9 oz)       Physical Examination:  GENERAL:  well-developed, well-nourished, in bed in no acute distress.   HEENT:  Head is normocephalic, atraumatic   EYES:  Eyes have anicteric sclerae without conjunctival injection or stigmata of endocarditis.    ENT:  Oropharynx is moist without  exudates or ulcers. Tongue is midline  ABDOMEN:  Normal bowel sounds, soft, nontender. No appreciable hepatosplenomegaly. Drain in place, draining brownish liquid           Laboratory Data:     Inflammatory Markers    Recent Labs   Lab Test 06/20/21  0842 06/19/21  0904 06/18/21  0739 06/14/21  0249   CRP 47.0* 68.0* 110.0* 120.0*       Hematology Studies    Recent Labs   Lab Test 06/20/21  0842 06/19/21  0904 06/18/21  0739 06/17/21  0736 06/16/21  0728 06/15/21  0633   WBC 9.2 8.2 10.3 10.5 10.6 11.4*   ANEU 7.0 5.7 8.3 8.7* 8.0 8.9*   AEOS 0.6 0.7 0.5 0.4 0.7 0.5   HGB 8.2* 7.9* 7.6* 7.1* 7.3* 7.6*   MCV 88 89 88 87 86 86   * 536* 501* 431 417 345       Metabolic Studies     Recent Labs   Lab Test 06/20/21  0842 06/19/21  0904 06/18/21  0739 06/17/21  0736 06/16/21  0728    135 136 136 132*   POTASSIUM 4.4 4.6 4.2 3.9 3.7   CHLORIDE 101 104 103 102 98   CO2 27 28 27 28 28   BUN 13 12 9 8 8   CR 0.46* 0.43* 0.46* 0.53 0.49*   GFRESTIMATED >90 >90 >90 >90 >90       Hepatic Studies    Recent Labs   Lab Test 06/20/21  0842 06/19/21  0904 06/18/21  0739 06/17/21  0736 06/16/21  0728 06/15/21  0633   BILITOTAL 0.6 0.3 0.4 0.4 0.5 0.6   ALKPHOS 233* 292* 392* 92 66 59   ALBUMIN 2.2* 2.0* 2.0* 1.9* 1.9* 2.0*   AST 23 33 102* 22 15 18   ALT 38 50 81* 14 11 10       Microbiology:  Culture Micro   Date Value Ref Range Status   06/15/2021 (A)  Final    Heavy growth  beta hemolytic   Streptococcus anginosus  This organism is susceptible to ampicillin, penicillin, vancomycin and the cephalosporins.   If treatment is required AND your patient is allergic to penicillin, contact the   Microbiology Lab within 5 days to request susceptibility testing.     06/15/2021 Light growth  Enterobacter cloacae complex   (A)  Final   06/15/2021 Heavy growth  Eikenella corrodens   (A)  Final   06/15/2021 (A)  Final    Light growth  Peyton tropicalis  Susceptibility testing not routinely done     06/15/2021 (A)  Final    Light  growth  Peyton dubliniensis  Susceptibility testing not routinely done     06/15/2021 Heavy growth  Mixed aerobic and anaerobic gayle   (A)  Final   06/15/2021 Candida tropicalis  isolated   (A)  Preliminary   06/14/2021 No growth  Final   06/14/2021 No growth  Final

## 2021-06-20 NOTE — PLAN OF CARE
Patient denies pain,noted tearful at times,voicing missing home.Tolerated t-fdg at goal rate,reports little appetite,calorie counts per team continue.Ad shemar in room,showered. Voiding,no stool. IV Antibx's per orders. Continue to monitor

## 2021-06-20 NOTE — PROGRESS NOTES
Calorie Count  Intake recorded for: 6/19  Total Kcals:  687  Total Protein:  26g  Kcals from Hospital Food:  687   Protein:  26g  Kcals from Outside Food (average):0 Protein: 0g  # Meals Ordered from Kitchen: 3 meals ordered  # Meals Recorded: Intake recorded for  2 meals: (first - 100% fruit ice, 80% scrambled eggs, 50% muffin)  (second - 100% fruit ice, 50% oz apple juice, turkey sandwich on white bread)   # Supplements Recorded: 0    Addendum made 6/21/2021@ 8:22AM - meal tray ticket from 6/19 found when collecting calorie counts for 6/20

## 2021-06-20 NOTE — PROGRESS NOTES
[facility], [place]     Daily Progress Note - Brant Cervantes Service        Date of service: 06/14/21      Assessment & Plan  Alexandra Nunez is a 40-year-old woman with history of gallstone pancreatitis s/p cholecystectomy w/ ERCP 4/2021 c/b peripancreatic fluid collection abutting the stomach necessitating NJ tube placement, who was recently admitted to [facility] for persistent abdominal pain with new concern for infected peripancreatic fluid collection. She was transferred here for further management and is now s/p RP drain placement on 6/15.    Changes Today:  - ID following, appreciate recs   = Meropenem changed to Ertapenem 1g IV q24h (6/20)   = Start Fluconazole 400mg PO qday (6/20) -> can discontinue Micafungin at end of day Tuesday 6/22)    = Will complete 3 doses of Tobramycin 6/21   = Continue to trend CRP over next 48-72 hours  - Continues to fall short of goals on calorie counts -> pt feels this is due to being too full to eat from TFs  - Will trial HOLDING TFs starting at midnight 6/20 and allow patient to eat only by mouth tomorrow 6/21 to see if she is able to take in more calories when not filled up with TFs, patient care order placed  - Will discuss need for repeat imaging with panc/bili in am 6/21     Sepsis 2/2 acute necrotizing pancreatitis with infected walled off necrotic collections  S/p RP drain placement (6/15)  Hx gallstone pancreatitis s/p cholecystectomy & ERCP (4/2021)  Initially presented with gallstone pancreatitis in 4/2021, underwent lap tracey on 4/28 and ERCP on 4/29. Recovery c/b development of acute enrrique-pancreatic fluid collections and inability to tolerate PO intake and required NJT placement 5/18 with tube feeds at home. Admitted with increased abdominal pain and fevers now s/p RP drain placed into necrotic peripancreatic fluid collection.  - GI panc/bili following, appreciate recs  - 6/15 RP collection cx -> strep anginosus, enterobacter  cloacae, eikenella corrodens, madison tropicalis  - 6/17 was switched back from Zosyn -> Meropenem given enterobacter growth and c/f ampc inducible resistance  - ID consulted 6/18, appreciate recs    = Meropenem changed to Ertapenem 1g IV q24h (6/20)   = Start Fluconazole 400mg PO qday (6/20) -> can discontinue Micafungin at end of day Tuesday 6/22)    = Will complete 3 doses of Tobramycin 6/21   = Continue to trend CRP over next 48-72 hours  - Pain control: tylenol prn, oxy available though patient uses sparingly  - Anti-emetics prn  - CT abd/pelvis 6/17 -> improvement in fluid collection size, will discuss with panc/bili when next imaging needed    Severe malnutrition in the context of acute on chronic illness  Based on: weight loss, moderate/severe subcutaneous fat loss, moderate/severe muscle loss. NJ in place due to inability to tolerate PO.  - Nutrition consult  - TF per dietician orders  - Calorie counts ongoing  - Will need to make decision regarding NJ vs removal vs PEG/J based on intake   - Will trial HOLDING TFs starting at midnight 6/20 and allow patient to eat only by mouth tomorrow 6/21 to see if she is able to take in more calories when not filled up with TFs, patient care order placed  - Will discuss need for repeat imaging with panc/bili in am 6/21     Chronic problems:  2nd-degree AV block c/b syncopal episodes s/p pacemaker: Medtronic pacemaker placed when she was 14, has had known non-functioning atrial lead for years.   Anxiety: Was intermittently taking xanax at home. Monitor symptoms here.  Mild intermittent asthma: Albuterol prn.      Diet: TF, Regular diet  DVT Prophylaxis: SubQ heparin  Code Status: Full code    Disposition:  Expected discharge: 4 - 7 days, recommended to prior living arrangement once adequate pain management/ tolerating PO medications, antibiotic plan established and nutrition plan in place.    Maia Long MD  [facility] Hospitalist  Text  Page      _____________________________________________________________________    Interval History  Nursing notes reviewed. No acute events overnight. Overall fever curve is improved. Patient states that abdomen, nausea, and malaise all generally feel improved. She is mostly just frustrated because she is not eating as much as she wants to, and she just wants to go home. She feels that the TFs fill her up to the the point she is unable to eat anything becaues she is too full, and is convinced she could eat enough at home to be fine. Denies fevers, chills, pain, swelling, or any other symptoms.     4-point ROS negative unless mentioned above.    Physical Exam  /85 (BP Location: Left arm)   Pulse 109   Temp 99.7  F (37.6  C) (Oral)   Resp 18   Ht 1.829 m (6')   Wt 98.2 kg (216 lb 7.9 oz)   SpO2 96%   BMI 29.36 kg/m    216 lbs 7.87 oz    Constitutional: awake, alert, cooperative, no acute distress  Respiratory: breathing comfortably on room air  Cardiovascular: tachycardic, regular  GI: soft, non-distended, NJ in place, RP drain in place with red serosanguinous output  Skin: normal skin color, texture, turgor  Musculoskeletal: no lower extremity edema present  Neurologic: alert and oriented x3, CNs II-XII grossly intact, moving all extremities equally      Labs and Imaging  All labs and imaging reviewed.

## 2021-06-21 LAB
ALBUMIN SERPL-MCNC: 2.4 G/DL (ref 3.4–5)
ALP SERPL-CCNC: 208 U/L (ref 40–150)
ALT SERPL W P-5'-P-CCNC: 30 U/L (ref 0–50)
ANION GAP SERPL CALCULATED.3IONS-SCNC: 6 MMOL/L (ref 3–14)
AST SERPL W P-5'-P-CCNC: 19 U/L (ref 0–45)
BASOPHILS # BLD AUTO: 0.1 10E9/L (ref 0–0.2)
BASOPHILS NFR BLD AUTO: 0.6 %
BILIRUB SERPL-MCNC: 0.4 MG/DL (ref 0.2–1.3)
BUN SERPL-MCNC: 14 MG/DL (ref 7–30)
CALCIUM SERPL-MCNC: 9.4 MG/DL (ref 8.5–10.1)
CHLORIDE SERPL-SCNC: 100 MMOL/L (ref 94–109)
CO2 SERPL-SCNC: 29 MMOL/L (ref 20–32)
CREAT SERPL-MCNC: 0.49 MG/DL (ref 0.52–1.04)
CRP SERPL-MCNC: 38 MG/L (ref 0–8)
DIFFERENTIAL METHOD BLD: ABNORMAL
ELASTASE PANC STL-MCNT: >800 UG/G
EOSINOPHIL # BLD AUTO: 0.6 10E9/L (ref 0–0.7)
EOSINOPHIL NFR BLD AUTO: 6.9 %
ERYTHROCYTE [DISTWIDTH] IN BLOOD BY AUTOMATED COUNT: 16.9 % (ref 10–15)
GFR SERPL CREATININE-BSD FRML MDRD: >90 ML/MIN/{1.73_M2}
GLUCOSE BLDC GLUCOMTR-MCNC: 113 MG/DL (ref 70–99)
GLUCOSE SERPL-MCNC: 100 MG/DL (ref 70–99)
HCT VFR BLD AUTO: 28.7 % (ref 35–47)
HGB BLD-MCNC: 8.5 G/DL (ref 11.7–15.7)
IMM GRANULOCYTES # BLD: 0.1 10E9/L (ref 0–0.4)
IMM GRANULOCYTES NFR BLD: 1 %
LYMPHOCYTES # BLD AUTO: 1.3 10E9/L (ref 0.8–5.3)
LYMPHOCYTES NFR BLD AUTO: 14.4 %
MAGNESIUM SERPL-MCNC: 2.3 MG/DL (ref 1.6–2.3)
MCH RBC QN AUTO: 25.7 PG (ref 26.5–33)
MCHC RBC AUTO-ENTMCNC: 29.6 G/DL (ref 31.5–36.5)
MCV RBC AUTO: 87 FL (ref 78–100)
MONOCYTES # BLD AUTO: 0.6 10E9/L (ref 0–1.3)
MONOCYTES NFR BLD AUTO: 7 %
NEUTROPHILS # BLD AUTO: 6.1 10E9/L (ref 1.6–8.3)
NEUTROPHILS NFR BLD AUTO: 70.1 %
NRBC # BLD AUTO: 0 10*3/UL
NRBC BLD AUTO-RTO: 0 /100
PLATELET # BLD AUTO: 581 10E9/L (ref 150–450)
POTASSIUM SERPL-SCNC: 4.5 MMOL/L (ref 3.4–5.3)
PROT SERPL-MCNC: 8.7 G/DL (ref 6.8–8.8)
RBC # BLD AUTO: 3.31 10E12/L (ref 3.8–5.2)
SODIUM SERPL-SCNC: 134 MMOL/L (ref 133–144)
WBC # BLD AUTO: 8.7 10E9/L (ref 4–11)

## 2021-06-21 PROCEDURE — 99233 SBSQ HOSP IP/OBS HIGH 50: CPT | Mod: GC | Performed by: INTERNAL MEDICINE

## 2021-06-21 PROCEDURE — 85025 COMPLETE CBC W/AUTO DIFF WBC: CPT | Performed by: STUDENT IN AN ORGANIZED HEALTH CARE EDUCATION/TRAINING PROGRAM

## 2021-06-21 PROCEDURE — 80053 COMPREHEN METABOLIC PANEL: CPT | Performed by: STUDENT IN AN ORGANIZED HEALTH CARE EDUCATION/TRAINING PROGRAM

## 2021-06-21 PROCEDURE — 258N000003 HC RX IP 258 OP 636: Performed by: INTERNAL MEDICINE

## 2021-06-21 PROCEDURE — 250N000011 HC RX IP 250 OP 636: Performed by: INTERNAL MEDICINE

## 2021-06-21 PROCEDURE — 250N000013 HC RX MED GY IP 250 OP 250 PS 637: Performed by: INTERNAL MEDICINE

## 2021-06-21 PROCEDURE — 99232 SBSQ HOSP IP/OBS MODERATE 35: CPT | Mod: GC | Performed by: INTERNAL MEDICINE

## 2021-06-21 PROCEDURE — 36592 COLLECT BLOOD FROM PICC: CPT | Performed by: STUDENT IN AN ORGANIZED HEALTH CARE EDUCATION/TRAINING PROGRAM

## 2021-06-21 PROCEDURE — 99233 SBSQ HOSP IP/OBS HIGH 50: CPT | Performed by: PHYSICIAN ASSISTANT

## 2021-06-21 PROCEDURE — 86140 C-REACTIVE PROTEIN: CPT | Performed by: STUDENT IN AN ORGANIZED HEALTH CARE EDUCATION/TRAINING PROGRAM

## 2021-06-21 PROCEDURE — 999N001017 HC STATISTIC GLUCOSE BY METER IP

## 2021-06-21 PROCEDURE — 120N000002 HC R&B MED SURG/OB UMMC

## 2021-06-21 PROCEDURE — 250N000011 HC RX IP 250 OP 636: Performed by: STUDENT IN AN ORGANIZED HEALTH CARE EDUCATION/TRAINING PROGRAM

## 2021-06-21 RX ADMIN — ONDANSETRON 4 MG: 2 INJECTION INTRAMUSCULAR; INTRAVENOUS at 22:05

## 2021-06-21 RX ADMIN — ALPRAZOLAM 0.5 MG: 0.25 TABLET ORAL at 22:16

## 2021-06-21 RX ADMIN — FLUCONAZOLE 400 MG: 200 TABLET ORAL at 09:19

## 2021-06-21 RX ADMIN — TOBRAMYCIN 400 MG: 40 INJECTION INTRAMUSCULAR; INTRAVENOUS at 11:07

## 2021-06-21 RX ADMIN — MICAFUNGIN SODIUM 150 MG: 50 INJECTION, POWDER, LYOPHILIZED, FOR SOLUTION INTRAVENOUS at 11:08

## 2021-06-21 RX ADMIN — ERTAPENEM SODIUM 1 G: 1 INJECTION, POWDER, LYOPHILIZED, FOR SOLUTION INTRAMUSCULAR; INTRAVENOUS at 16:40

## 2021-06-21 ASSESSMENT — ACTIVITIES OF DAILY LIVING (ADL)
ADLS_ACUITY_SCORE: 16
ADLS_ACUITY_SCORE: 16
ADLS_ACUITY_SCORE: 18
ADLS_ACUITY_SCORE: 16

## 2021-06-21 ASSESSMENT — MIFFLIN-ST. JEOR: SCORE: 1700.97

## 2021-06-21 NOTE — PROGRESS NOTES
[facility], [place]     Daily Progress Note - Brant Cervantes Service        Date of service: 06/14/21      Assessment & Plan  Alexandra Nunez is a 40-year-old woman with history of gallstone pancreatitis s/p cholecystectomy w/ ERCP 4/2021 c/b peripancreatic fluid collection abutting the stomach necessitating NJ tube placement, who was recently admitted to [facility] for persistent abdominal pain with new concern for infected peripancreatic fluid collection. She was transferred here for further management and is now s/p RP drain placement on 6/15.    Changes Today:  - CRP trending down --> continue current antimicrobials (ertapenem, fluconazole, micafungin until Tuesday)  - Pull NJT --> stopping TF to stimulate appetite, if this fails will likely pursue PEG/J and patient feels the tube obstructs her eating  - Calorie counts, patient trying to reach goal calorie intake orally    Sepsis (resolved) 2/2 acute necrotizing pancreatitis with infected walled off necrotic collections  S/p RP drain placement (6/15)  Hx gallstone pancreatitis s/p cholecystectomy & ERCP (4/2021)  Initially presented with gallstone pancreatitis in 4/2021, underwent lap tracey on 4/28 and ERCP on 4/29. Recovery c/b development of acute enrrique-pancreatic fluid collections and inability to tolerate PO intake and required NJT placement 5/18 with tube feeds at home. Admitted with increased abdominal pain and fevers now s/p RP drain placed into necrotic peripancreatic fluid collection. Fluid culture growing strep anginosus, enterobacter cloacae, eikenella corrodens, candida tropicalis. CT abd/pelvis 6/17 shows improvement in fluid collection size.  - GI panc/bili following, appreciate recs  - ID consulted 6/18, appreciate recs    - Ertapenem 1g IV q24h (started 6/20)   - Fluconazole 400mg PO qday (started 6/20)   - Can discontinue Micafungin at end of day Tuesday 6/22 (this is ordered)   - Will complete 3 doses of tobramycin  6/21   - Planning for 3 weeks antibiotics   - Patient to follow up in ID clinic in 2-3 weeks, before antibiotics are finished  - Patient will need PICC placed, likely 6/22-6/23 pending nutrition planning  - Pain control: tylenol prn, oxy available though patient uses sparingly  - Anti-emetics prn    Severe malnutrition in the context of acute on chronic illness  Based on: weight loss, moderate/severe subcutaneous fat loss, moderate/severe muscle loss.  - Nutrition consult  - DISCONTINUE tube feeds today to attempt appetite stimulation  - Remove NJT to eliminate possible obstruction for eating  - Calorie counts ongoing, attempting to make goal orally  - If trial of PO only fails, will likely pursue PEG/J in next day or so    Chronic problems:  2nd-degree AV block c/b syncopal episodes s/p pacemaker: Medtronic pacemaker placed when she was 14, has had known non-functioning atrial lead for years.   Anxiety: Home xanax ordered prn.  Mild intermittent asthma: Albuterol prn.      Diet: Regular diet  DVT Prophylaxis: SubQ heparin  Code Status: Full code    Disposition:  Expected discharge: 2 - 3 days, recommended to prior living arrangement once antibiotic plan established and nutrition plan in place.    Patient was staffed with attending Dr. Long.    Amy Glaser MD  Internal Medicine, PGY-2    _____________________________________________________________________    Interval History  Nursing notes reviewed. No acute events overnight. Patient is feeling better. She is excited to try and eat more today with the tube feeds turned off. They have been turned off since midnight and she is feeling hungry this morning, still waiting for breakfast. She does note that a lot of foods are hard to eat because they get stuck around the tube, so discussed taking it out since we would likely pursue PEG/J in the event that TFs are still needed in the future and she is in agreement with this. Pain is well controlled. No  fevers.    4-point ROS negative unless mentioned above.    Physical Exam  /82 (BP Location: Left arm)   Pulse 111   Temp 95.7  F (35.4  C) (Oral)   Resp 16   Ht 1.829 m (6')   Wt 92.9 kg (204 lb 12.8 oz)   SpO2 95%   BMI 27.78 kg/m    204 lbs 12.8 oz    Constitutional: awake, alert, cooperative, no acute distress  Respiratory: breathing comfortably on room air  Cardiovascular: tachycardic, regular  GI: soft, non-distended, NJ in place, RP drain in place with serosanguinous output  Skin: normal skin color, texture, turgor  Musculoskeletal: no lower extremity edema present  Neurologic: alert and oriented x3, CNs II-XII grossly intact, moving all extremities equally      Labs and Imaging  All labs and imaging reviewed.

## 2021-06-21 NOTE — PROVIDER NOTIFICATION
"Provider notified \"7B 24 MARIA D Nunez Diet is NPO but all notes indicate she can eating today to trial being off tube feeds. can you  update diet order please? Thx Estella MARCH [id number]\"    Estella Mcclain RN on 6/21/2021 at 8:07 AM    "

## 2021-06-21 NOTE — PLAN OF CARE
Temp: 99.7  F (37.6  C) Temp src: Oral BP: 130/85 Pulse: 109   Resp: 18 SpO2: 96 % O2 Device: None (Room air)     DOA: 6/13/2021    Reason for admission: Pancreatitis    Activity:  standby assist    Pain:  Denies    Neuro: A/O x4    Cardiac: WNL, denies chest pain    Respiratory:  ON RA, not in distress    GI/:  voids without difficulty, no BM this shift    Diet: Regular, tolerated all feeds    Lines:  PICC triple lumen     Incisions/Drains/Skin:  NJ with tube feeding Osmolite running at 65ml/hr    Lab:  Reviewed     New changes this shift: NPO from 12MN and stop tube feeding for procedure tomorrow.

## 2021-06-21 NOTE — PLAN OF CARE
Vitals: Temp: 98.8  F (37.1  C) Temp src: Axillary BP: 118/85 Pulse: 108   Resp: 16 SpO2: 96 % O2 Device: None (Room air)        Time: 0172-6894  Reason for admission: Pancreatitis, fluid collection  Activity:  Up ad shemar  Pain:  Denies  Neuro: A&O x4, able to make needs known  Cardiac: ex, tachycardic  Respiratory:  WDL  GI/:  Voiding not saving. BM yesterday loose.   Diet: Reg, fair appetite, drinking boost shakes.   Lines:  R PICC TL infusing TKO for abx  Incisions/Drains/Skin:  Pale. Intact. R drain irrigated per MAR.   Lab:  CRP 38, Hgb 8.5, WBC 8.7, Creat 0.49  Electrolyte Replacements: NA    New changes this shift:  NGT removed, pt encouraged PO intake to avoid additional nutrition needs/PEG tube placement.

## 2021-06-21 NOTE — PROGRESS NOTES
"CLINICAL NUTRITION SERVICES - REASSESSMENT NOTE     Nutrition Prescription    RECOMMENDATIONS FOR MDs/PROVIDERS TO ORDER:  - Pt should be able to consume at least 65% of the low end of her estimated needs to avoid further TF (1573 kcal and 103 gm protein).     Malnutrition Status:    Severe malnutrition in the context of acute on chronic illness.     Recommendations already ordered by Registered Dietitian (RD):  - Adjust supplements: Ensure Enlive shakes 4x/d--AM, PM, Dinner and HS.  - Discontinue Ensure Clear.  - TF held today to  adequacy of po intake.  - Continue calorie counts.     Future/Additional Recommendations:  - Provide further education to pt re: tips to help her better meet her needs orally at home.   - Monitor po intake, GI function.      Pt states that she was told if she drank 4 Supplement shakes/day she wouldn't need to resume TF.  With ice cream added, she does like the ensure enlive (sent in lieu of Boost + with our updated formulary).    EVALUATION OF THE PROGRESS TOWARD GOALS   Diet: Regular + Ensure Clear BID, MD ordered \"Boost Plus\" (shakes) w/ meals this AM (pt dislikes milky supplements per past visits with pt)  Nutrition Support: discontinued today to see if this would help pt's po intake to determine whether pt needs a permanent FT placed. Previously receiving Osmolite 1.5 @ 65 ml/hr (goal).   Intake: TF volume received each day not complete in I/O so unable to .  However TF was held or running at a low rate 6/15 through 6/18 due to tests/prodecures and loose stools (likely    related to liquid tylenol).     NEW FINDINGS   -Wt: wt down at least ~7# in the past 6 days comparing standing weights., down ~10# in the past 7 days (4.5%). Wt down 33# (14%) in about the past 2 months.   06/21/21 0447 92.9 kg (204 lb 12.8 oz) standing   06/19/21 1644 98.2 kg (216 lb 7.9 oz) bed   06/18/21 2233 98.2 kg (216 lb 7.9 oz) bed   06/18/21 0912 94.5 kg (208 lb 6.4 oz) standing   06/17/21 1538 " 98.4 kg (216 lb 14.9 oz) bed   06/16/21 1101 97.3 kg (214 lb 6.4 oz) no source   06/15/21 1017 96 kg (211 lb 9.6 oz) Standing   06/14/21 0100 97.3 kg (214 lb 8.1 oz)   04/25/21       108 kg (238 lbs 1.55 oz)    Wt loss doesn't warrant adjusting estimated needs as the dose weight would only decrease by 1 kg and change kcal needs by < 100 kcal.    -Labs: CRP 47.0 (trending down). Fecal elastase > 800.0.  -GI: Stools continue to be loose but not severe as they were last week. Last BM 6/21. Suspect lingering loose stools associated with antibiotics.   -Skin: No open areas noted per RN charting. Noted Skater drain at R back.   -Meds: diflucan, IV antifungal, imodium, PRNs: oxycodone, zofran, compazine, simethicone.   -EN Access:  NJ FT-placed sometime in 4/2021.  -PMH/Nutrition Hx: gallstone pancreatitis s/p cholecystectomy w/ERCP 4/2021 c/b 5 cm peripancreatic fluid collection abutting the stomach necessitating NJ tube placement for tube feeding, recently admitted to [facility] for persistent abdominal pain with new concern for infected peripancreatic fluid collection. Found to have necrotizing pancreatitis.    MALNUTRITION  % Intake: < 75% for > 7 days (non-severe)  % Weight Loss: > 7.5% in 3 months (severe)  Subcutaneous Fat Loss: Facial region:  Severe-periorbital  Muscle Loss: Temporal:  moderate, Facial & jaw region:  moderate, Thoracic region (clavicle, acromium bone, deltoid, trapezius, pectoral):  mild, Dorsal hand:  severe and Posterior calf:  severe  Fluid Accumulation/Edema: None noted  Malnutrition Diagnosis: Severe malnutrition in the context of acute on chronic illness.     Previous Goals   Total avg nutritional intake to meet a minimum of 25 kcal/kg and 1.3 g PRO/kg daily (per dosing wt 78.8 kg).  Evaluation: Unable to evaluate--possible not due to tests and rate decrease for diarrhea over past week.     Previous Nutrition Diagnosis  Inadequate oral intake related to pancreatitis as evidenced by  need for enteral nutrition to meet 100% of needs  Evaluation: No longer applicable, nutrition diagnosis changed below    CURRENT NUTRITION DIAGNOSIS  Inadequate oral intake related to early satiety, dislike of hospital food and high needs as evidenced by continued need for TF.      INTERVENTIONS  Implementation  Education--discussed best options for protein/calories on menu with pt (highlighted).   Medical nutrition supplements-modified.    Goals  Total avg nutritional intake to meet a minimum of 30 kcal/kg and 2.0 g PRO/kg daily (per dosing wt 78.8 kg).    Monitoring/Evaluation  Progress toward goals will be monitored and evaluated per protocol.    Ambika Griffiths RD, LD   7B (M-F) Pager: [phone]  RD Weekend Pager: [phone]

## 2021-06-21 NOTE — PROGRESS NOTES
Calorie Count    Intake recorded for: 6/20/2021  Total Kcals: 622 Total Protein: 24g    Kcals from Hospital Food: 622   Protein: 24g    Kcals from Outside Food (average):0 Protein: 0g    # Meals Ordered from Kitchen: 3 meals ordered     # Meals Recorded: 3 recorded (first - 100% peaches, 25% sandwich on white bread w/ 2 peanut butter)  (second - 100% 8oz apple juice, 50% alejandrina food cake, green beans, less than 25% turkey burger on white bun)  (third - 100% fruit ice, 75% scrambled eggs)    # Supplements Recorded: no intake recorded

## 2021-06-21 NOTE — PROGRESS NOTES
GENERAL ID SERVICE [place] Team Progress Note: Final Sign Off Note     Patient:  Alexandra Nunez   Date of birth 1980, Medical record number [id number]  Date of Visit:  06/21/2021  Date of Admission: 6/13/2021  Consult Requester:Maia Long MD          Assessment and Recommendations:   Assessment:  1. polymicrobial abscess of enteric gayle - Strep anginosis, Eikenella, Enterobacter + two Candida species (both fluconazole sensitive)  2. necrotic pancreatic pseudocyst  3. Normal renal function    Final Recommendations:  1. Continue ertapenem 1g IV Q24Hr x 3 weeks in total (through 7/5/21)  2. Continue fluconazole 400mg PO QDay with discontinuation of micafungin this Tuesday.  3. Trend CRP for another 24 hours (trending down on current regimen)  4. Re-evaluate duration of the drain placement.  CRP rapidly normalizing.     Plan for 3 weeks of antibiotics, check CBC, Creatinine, CRP 2x per week as outpatient. ID follow up in 2-3 weeks before antibiotics finish would be ideal. However, if there is primary care or GI following her, that is fine as well.  Based on her response to date, we think she will do well in the near term.    ID will sign off and chart check tomorrow's CRP level to ensure it continues to trend down on ertapenem/fluconazole    Rajan Pabon MD MPH  PGY3      Plan discussed with Dr. Snider         History of Present Illness:   Alexandra Nunez is a 40-year-old woman with a hx of gallstone pancreatitis s/p cholecystectomy w/ERCP 4/2021 c/b 5 cm peripancreatic fluid collection abutting the stomach necessitating NJ tube placement for tube feeding, recently admitted to [facility] for persistent abdominal pain with new concern for infected peripancreatic fluid collection. She transferred here for drainage of this fluid collection.      Symptomatically, Alexandra endorses periumbilical abdominal pain at about 6/10. At the outside hospital, she was getting pain control with  liquid oxycodone which helped with pain. Pain is band like across abdomen. She had her last bowel movement yesterday, recently constipated and got an enema for it. No blood in stool. Had been getting continuous tube feeding tolerating decently. She was febrile up to 104 at [place] which prompted starting vancomycin and meropenem for suspected infected peripancreatic fluid collection given nonremitting fevers also noted prior up to 102. She was last febrile before transfer she states. Blood cultures done apparently growing Strep angionosus. No current subjective fevers or chills.      Most recent imaging from 6/7 demonstrated a 6.3 cm fluid collection in the pancreas.  [facility] provider indicates that this patient was discussed in multidisciplinary conference today that included Dr. Freeman of GI panc/bili who suggested transfer for endoscopic management, drainage of R paracolic gutter.           Review of Systems:   CONSTITUTIONAL:  No fevers or chills  EYES: negative for icterus  ENT:  negative for hearing loss, tinnitus and sore throat  RESPIRATORY:  negative for cough with sputum and dyspnea  CARDIOVASCULAR:  negative for chest pain, dyspnea  GASTROINTESTINAL:  negative for nausea, vomiting, diarrhea and constipation  GENITOURINARY:  negative for dysuria  HEME:  No easy bruising  INTEGUMENT:  negative for rash and pruritus  NEURO:  Negative for headache           Physical Exam:   Vitals were reviewed  Patient Vitals for the past 24 hrs:   BP Temp Temp src Pulse Resp SpO2 Weight   06/21/21 0746 119/82 95.7  F (35.4  C) Oral 111 16 95 % --   06/21/21 0447 121/84 96.3  F (35.7  C) Oral 94 18 95 % 92.9 kg (204 lb 12.8 oz)   06/20/21 2351 109/82 98.8  F (37.1  C) Oral 89 18 96 % --   06/20/21 2152 134/88 96.4  F (35.8  C) Oral 113 18 96 % --   06/20/21 1534 130/85 99.7  F (37.6  C) Oral 109 18 96 % --       Physical Examination:  GENERAL:  well-developed, well-nourished, in bed in no acute distress.   HEENT:   Head is normocephalic, atraumatic   EYES:  Eyes have anicteric sclerae without conjunctival injection or stigmata of endocarditis.    ENT:  Oropharynx is moist without exudates or ulcers. Tongue is midline  ABDOMEN:  Normal bowel sounds, soft, nontender. No appreciable hepatosplenomegaly. Drain in place, draining brownish liquid           Laboratory Data:     Inflammatory Markers    Recent Labs   Lab Test 06/21/21  0711 06/20/21  0842 06/19/21  0904 06/18/21  0739 06/14/21  0249   CRP 38.0* 47.0* 68.0* 110.0* 120.0*       Hematology Studies    Recent Labs   Lab Test 06/21/21  0711 06/20/21  0842 06/19/21  0904 06/18/21  0739 06/17/21  0736 06/16/21  0728   WBC 8.7 9.2 8.2 10.3 10.5 10.6   ANEU 6.1 7.0 5.7 8.3 8.7* 8.0   AEOS 0.6 0.6 0.7 0.5 0.4 0.7   HGB 8.5* 8.2* 7.9* 7.6* 7.1* 7.3*   MCV 87 88 89 88 87 86   * 558* 536* 501* 431 417       Metabolic Studies     Recent Labs   Lab Test 06/21/21  0711 06/20/21  0842 06/19/21  0904 06/18/21  0739 06/17/21  0736    133 135 136 136   POTASSIUM 4.5 4.4 4.6 4.2 3.9   CHLORIDE 100 101 104 103 102   CO2 29 27 28 27 28   BUN 14 13 12 9 8   CR 0.49* 0.46* 0.43* 0.46* 0.53   GFRESTIMATED >90 >90 >90 >90 >90       Hepatic Studies    Recent Labs   Lab Test 06/21/21  0711 06/20/21  0842 06/19/21  0904 06/18/21  0739 06/17/21  0736 06/16/21  0728   BILITOTAL 0.4 0.6 0.3 0.4 0.4 0.5   ALKPHOS 208* 233* 292* 392* 92 66   ALBUMIN 2.4* 2.2* 2.0* 2.0* 1.9* 1.9*   AST 19 23 33 102* 22 15   ALT 30 38 50 81* 14 11       Microbiology:  Culture Micro   Date Value Ref Range Status   06/15/2021 (A)  Final    Heavy growth  beta hemolytic   Streptococcus anginosus  This organism is susceptible to ampicillin, penicillin, vancomycin and the cephalosporins.   If treatment is required AND your patient is allergic to penicillin, contact the   Microbiology Lab within 5 days to request susceptibility testing.     06/15/2021 Light growth  Enterobacter cloacae complex   (A)  Final    06/15/2021 Heavy growth  Eikenella corrodens   (A)  Final   06/15/2021 (A)  Final    Light growth  Peyton tropicalis  Susceptibility testing not routinely done     06/15/2021 (A)  Final    Light growth  Peyton dubliniensis  Susceptibility testing not routinely done     06/15/2021 Heavy growth  Mixed aerobic and anaerobic gayle   (A)  Final   06/15/2021 Candida tropicalis  isolated   (A)  Preliminary   06/14/2021 No growth  Final   06/14/2021 No growth  Final

## 2021-06-21 NOTE — PROGRESS NOTES
GASTROENTEROLOGY PROGRESS NOTE    Date of Admission: 6/13/2021  Reason for Admission: abdominal pain, fever    Assessment:  40 year old female with a history of gallstone pancreatitis who underwent cholecystectomy (4/28) and ERCP (4/29) for choledocholithiasis at OSH who was transferred for concern of infected walled off necrotic collections.     #. Acute necrotizing pancreatitis with presumed infected walled off necrotic collections  #. Abdominal pain  #. Intermittent nausea and vomiting  Patient initially presented to OSH on 4/25 with acute abdominal pain, nausea and vomiting, lipase >5000, BISAP 1, underwent cholecystectomy as well as ERCP the following day for positive IOC. Uncomplicated hospital stay post cholecystectomy, but returned in May for nausea, vomiting and abdominal pain, found to have developing acute necrotic collections. NJT placed on 5/18 but patient unable to tolerate much due to N/V.     Now admitted with fevers and leukocytosis as well as walled off necrotic collections that are possibly source of infection. The largest collection is in the right abdomen but not in a good location for endoscopic transluminal drainage so IR placed a right sided retroperitoneal perc drain 6/15 with anmol pus - micro + for strep angionosus, enterobacter, eikenella corrodens & yeast like organisms.     Etiology: biliary  Date of onset: 4/25/2021  BISAP score on admission: BISAP 1 (SIRS)  Concurrent organ failure: none  Thromboses: none  Diabetes: no  Current nutrition access: NJT + oral diet  Last imaging: CT scan abd/pelv with IV contrast 6/17  Infection/antiinfectives: Meropenem + Micafungin (6/18 - )   Interventions:                    4/28: Cholecystectomy                    4/29: ERCP for choledocholithiasis seen on IOC         6/15: R RP Perc drain placement     Updates: Afebrile in last 24hrs, tube feeds stopped this morning to stimulate appetite    Recommendations:  -- Agree with holding tube feeds  today for appetite stimulation  -- Continue antibiotics per primary team/ID  -- If not meeting caloric goals would consider PEG-J placemen in the coming days  -- Supportive care - including analgesia and anti-emetics - per primary team    The patient was discussed and plan agreed upon with GI staff, Dr Velazquez.    Yoana Abbott PA-C  Advanced Endoscopy/Pancreaticobiliary GI Service  [facility]  Pager *[phone]  Text Page  _______________________________________________________________      Subjective: Nursing notes and 24hr events reviewed.   -Afebrile  -Pain controlled  -Working on diet advancement (likes the Boost shakes), isn't sure if she wants a PEG-J tube but also strongly wants the NJT out    ROS:   4 pt ROS negative unless noted in subjective.     Objective:  Blood pressure 119/82, pulse 111, temperature 95.7  F (35.4  C), temperature source Oral, resp. rate 16, height 1.829 m (6'), weight 92.9 kg (204 lb 12.8 oz), SpO2 95 %.  Gen: Sitting in bed. Appears comfortable. Temporal wasting  HEENT: Sclera anicteric. NJ tube in place  CV: Tachycardic. No Shikha edema  Resp: Breathing comfortably on RA  Abd: Soft, tender upper abdomen, no guarding or rebound  Skin:  no jaundice  Ext: warm, well perfused  Mental status/Psych: A&O. Asks/answers questions appropriately   Drains: orange/purulent output in gravity bag     Date 06/21/21 0700 - 06/22/21 0659   Shift 9263-2389 9850-0426 1803-1232 24 Hour Total   INTAKE   P.O. 250   250   Other 10   10   Shift Total(mL/kg) 260(2.8)   260(2.8)   OUTPUT   Drains 30   30   Shift Total(mL/kg) 30(0.32)   30(0.32)   Weight (kg) 92.9 92.9 92.9 92.9       LABS:  BMP  Recent Labs   Lab 06/21/21  0711 06/20/21  0842 06/19/21  0904 06/18/21  0739    133 135 136   POTASSIUM 4.5 4.4 4.6 4.2   CHLORIDE 100 101 104 103   MARTIN 9.4 8.8 8.6 8.4*   CO2 29 27 28 27   BUN 14 13 12 9   CR 0.49* 0.46* 0.43* 0.46*   * 111* 112* 116*     CBC  Recent Labs   Lab  06/21/21  0711 06/20/21  0842 06/19/21  0904 06/18/21  0739   WBC 8.7 9.2 8.2 10.3   RBC 3.31* 3.20* 3.05* 2.96*   HGB 8.5* 8.2* 7.9* 7.6*   HCT 28.7* 28.2* 27.0* 26.1*   MCV 87 88 89 88   MCH 25.7* 25.6* 25.9* 25.7*   MCHC 29.6* 29.1* 29.3* 29.1*   RDW 16.9* 16.2* 16.1* 15.9*   * 558* 536* 501*     LFTs  Recent Labs   Lab 06/21/21  0711 06/20/21  0842 06/19/21  0904 06/18/21  0739   ALKPHOS 208* 233* 292* 392*   AST 19 23 33 102*   ALT 30 38 50 81*   BILITOTAL 0.4 0.6 0.3 0.4   PROTTOTAL 8.7 8.4 8.0 7.6   ALBUMIN 2.4* 2.2* 2.0* 2.0*      IMAGING:  CT Abdomen Pelvis w/Contrast 6/17/21:  1. Multiple infected walled off necrotic collections as described  above, decreased in size from prior exam. Interval placement of drain  in the gastrohepatic collection. No new collections.  2. Pneumobilia consistent with patient's recent sphincterotomy.  3. Mild dilatation of the common bile duct measuring up to 1.1 cm,  likely reservoir effect given history of cholecystectomy.

## 2021-06-22 LAB
ALBUMIN SERPL-MCNC: 2.5 G/DL (ref 3.4–5)
ALP SERPL-CCNC: 182 U/L (ref 40–150)
ALT SERPL W P-5'-P-CCNC: 27 U/L (ref 0–50)
ANION GAP SERPL CALCULATED.3IONS-SCNC: 5 MMOL/L (ref 3–14)
AST SERPL W P-5'-P-CCNC: 20 U/L (ref 0–45)
BASOPHILS # BLD AUTO: 0.1 10E9/L (ref 0–0.2)
BASOPHILS NFR BLD AUTO: 0.9 %
BILIRUB SERPL-MCNC: 0.8 MG/DL (ref 0.2–1.3)
BUN SERPL-MCNC: 15 MG/DL (ref 7–30)
CALCIUM SERPL-MCNC: 9.4 MG/DL (ref 8.5–10.1)
CHLORIDE SERPL-SCNC: 97 MMOL/L (ref 94–109)
CO2 SERPL-SCNC: 29 MMOL/L (ref 20–32)
CREAT SERPL-MCNC: 0.52 MG/DL (ref 0.52–1.04)
CRP SERPL-MCNC: 35 MG/L (ref 0–8)
DIFFERENTIAL METHOD BLD: ABNORMAL
EOSINOPHIL # BLD AUTO: 0.5 10E9/L (ref 0–0.7)
EOSINOPHIL NFR BLD AUTO: 6.9 %
ERYTHROCYTE [DISTWIDTH] IN BLOOD BY AUTOMATED COUNT: 17.3 % (ref 10–15)
GFR SERPL CREATININE-BSD FRML MDRD: >90 ML/MIN/{1.73_M2}
GLUCOSE SERPL-MCNC: 99 MG/DL (ref 70–99)
HCT VFR BLD AUTO: 27.5 % (ref 35–47)
HGB BLD-MCNC: 8.3 G/DL (ref 11.7–15.7)
IMM GRANULOCYTES # BLD: 0 10E9/L (ref 0–0.4)
IMM GRANULOCYTES NFR BLD: 0.5 %
LYMPHOCYTES # BLD AUTO: 1.3 10E9/L (ref 0.8–5.3)
LYMPHOCYTES NFR BLD AUTO: 16.6 %
MAGNESIUM SERPL-MCNC: 2.3 MG/DL (ref 1.6–2.3)
MCH RBC QN AUTO: 25.8 PG (ref 26.5–33)
MCHC RBC AUTO-ENTMCNC: 30.2 G/DL (ref 31.5–36.5)
MCV RBC AUTO: 85 FL (ref 78–100)
MONOCYTES # BLD AUTO: 0.6 10E9/L (ref 0–1.3)
MONOCYTES NFR BLD AUTO: 8 %
NEUTROPHILS # BLD AUTO: 5.2 10E9/L (ref 1.6–8.3)
NEUTROPHILS NFR BLD AUTO: 67.1 %
NRBC # BLD AUTO: 0 10*3/UL
NRBC BLD AUTO-RTO: 0 /100
PLATELET # BLD AUTO: 566 10E9/L (ref 150–450)
POTASSIUM SERPL-SCNC: 4.6 MMOL/L (ref 3.4–5.3)
PROT SERPL-MCNC: 8.8 G/DL (ref 6.8–8.8)
RBC # BLD AUTO: 3.22 10E12/L (ref 3.8–5.2)
SODIUM SERPL-SCNC: 131 MMOL/L (ref 133–144)
WBC # BLD AUTO: 7.7 10E9/L (ref 4–11)

## 2021-06-22 PROCEDURE — 85025 COMPLETE CBC W/AUTO DIFF WBC: CPT | Performed by: STUDENT IN AN ORGANIZED HEALTH CARE EDUCATION/TRAINING PROGRAM

## 2021-06-22 PROCEDURE — 80053 COMPREHEN METABOLIC PANEL: CPT | Performed by: STUDENT IN AN ORGANIZED HEALTH CARE EDUCATION/TRAINING PROGRAM

## 2021-06-22 PROCEDURE — 250N000013 HC RX MED GY IP 250 OP 250 PS 637: Performed by: INTERNAL MEDICINE

## 2021-06-22 PROCEDURE — 258N000003 HC RX IP 258 OP 636: Performed by: INTERNAL MEDICINE

## 2021-06-22 PROCEDURE — 250N000011 HC RX IP 250 OP 636: Performed by: STUDENT IN AN ORGANIZED HEALTH CARE EDUCATION/TRAINING PROGRAM

## 2021-06-22 PROCEDURE — 120N000002 HC R&B MED SURG/OB UMMC

## 2021-06-22 PROCEDURE — 99207 PR CDG-MDM COMPONENT: MEETS MODERATE - DOWN CODED: CPT | Performed by: STUDENT IN AN ORGANIZED HEALTH CARE EDUCATION/TRAINING PROGRAM

## 2021-06-22 PROCEDURE — 250N000011 HC RX IP 250 OP 636: Performed by: INTERNAL MEDICINE

## 2021-06-22 PROCEDURE — 86140 C-REACTIVE PROTEIN: CPT | Performed by: STUDENT IN AN ORGANIZED HEALTH CARE EDUCATION/TRAINING PROGRAM

## 2021-06-22 PROCEDURE — 36592 COLLECT BLOOD FROM PICC: CPT | Performed by: STUDENT IN AN ORGANIZED HEALTH CARE EDUCATION/TRAINING PROGRAM

## 2021-06-22 PROCEDURE — 83735 ASSAY OF MAGNESIUM: CPT | Performed by: STUDENT IN AN ORGANIZED HEALTH CARE EDUCATION/TRAINING PROGRAM

## 2021-06-22 PROCEDURE — 99233 SBSQ HOSP IP/OBS HIGH 50: CPT | Performed by: PHYSICIAN ASSISTANT

## 2021-06-22 PROCEDURE — 99232 SBSQ HOSP IP/OBS MODERATE 35: CPT | Mod: GC | Performed by: STUDENT IN AN ORGANIZED HEALTH CARE EDUCATION/TRAINING PROGRAM

## 2021-06-22 RX ORDER — OXYCODONE HYDROCHLORIDE 5 MG/1
5-10 TABLET ORAL EVERY 4 HOURS PRN
Status: DISCONTINUED | OUTPATIENT
Start: 2021-06-22 | End: 2021-06-23 | Stop reason: HOSPADM

## 2021-06-22 RX ADMIN — ERTAPENEM SODIUM 1 G: 1 INJECTION, POWDER, LYOPHILIZED, FOR SOLUTION INTRAMUSCULAR; INTRAVENOUS at 16:08

## 2021-06-22 RX ADMIN — FLUCONAZOLE 400 MG: 200 TABLET ORAL at 08:42

## 2021-06-22 RX ADMIN — ALPRAZOLAM 0.5 MG: 0.25 TABLET ORAL at 20:36

## 2021-06-22 RX ADMIN — MICAFUNGIN SODIUM 150 MG: 50 INJECTION, POWDER, LYOPHILIZED, FOR SOLUTION INTRAVENOUS at 11:18

## 2021-06-22 RX ADMIN — ALTEPLASE 2 MG: 2.2 INJECTION, POWDER, LYOPHILIZED, FOR SOLUTION INTRAVENOUS at 12:37

## 2021-06-22 RX ADMIN — ONDANSETRON 4 MG: 2 INJECTION INTRAMUSCULAR; INTRAVENOUS at 20:36

## 2021-06-22 RX ADMIN — HEPARIN SODIUM 5000 UNITS: 5000 INJECTION, SOLUTION INTRAVENOUS; SUBCUTANEOUS at 20:36

## 2021-06-22 ASSESSMENT — ACTIVITIES OF DAILY LIVING (ADL)
ADLS_ACUITY_SCORE: 16

## 2021-06-22 ASSESSMENT — MIFFLIN-ST. JEOR: SCORE: 1701.42

## 2021-06-22 NOTE — PLAN OF CARE
Time: 9547-9790    Admission/Diagnosis:  Pancreatitis [K85.90]    /76 (BP Location: Left arm)   Pulse 103   Temp 97.9  F (36.6  C) (Oral)   Resp 20   Ht 1.829 m (6')   Wt 92.9 kg (204 lb 12.8 oz)   SpO2 96%   BMI 27.78 kg/m      Shift Updates: VSS on RA, slightly tachycardic with shallow respirations. Pain 5/10 at RP drain site, denies pain meds. Denies N/V. R PICC with occluded gray port and sluggish white port, plan for TPA. RP drain c/d/i, output is yellow and serous. Adequate UOP with 1 BM. Reg diet, on calorie counts. Up ad shemar, independently. CRP trending down.     Plan: Encourage meeting calorie goals. Goal for d/c Thursday if patient can maintain adequate nutrition or consider PEG tube. Continue antibiotics and management of pain, N/V.    Continue to monitor and with POC.

## 2021-06-22 NOTE — PROGRESS NOTES
"  CLINICAL NUTRITION SERVICES - BRIEF NOTE     Nutrition Prescription    RECOMMENDATIONS FOR MDs/PROVIDERS TO ORDER:  - Pt should be able to consume at least 65% of the low end of her estimated needs to avoid further TF (1573 kcal and 103 gm protein).    Recommendations already ordered by Registered Dietitian (RD):  -Continue Ensure Enlive shakes 4x/d (480 kcal, 22 gm protein each).  - Send trial of 1 vanilla Ensure Max for pt to try (she may use at home w/ ice cream)    Future/Additional Recommendations:  - Continue to follow po intake to determine whether pt could avoid FT placement.      EVALUATION OF THE PROGRESS TOWARD GOALS   Diet: regular Strawberry Ensure Shake 4x/d.  Nutrition Support: Stopped 6/21 and NJ removed to see if pt can eat adequately.  Intake: Calorie counts 6/21: 2 meals and 2 Ensure Shakes included but pt also had a 3rd Ensure Shake and some mac and cheese and applesauce.  Including the 3rd Ensure Shake and rounding up for the other food with dinner she ate about 2000 kcal and 90 gm protein.     Today she was disappointed that she didn't like the potatoes she had ordered with lunch.  \"Everything has a funky taste and then I can't get the taste out of my mouth afterwards.\"  She planned to keep working on the bowl of mac and cheese she ordered.     Ideally pt will be able to consume total of 2364 kcal (30 kcal/kg) and 158 gm protein (2.0 gm/kg) once she is at home.  Advised her that ~118 gm would be the very low end of protein goal (1.5 gm/kg).       NEW FINDINGS   GI: c/o early satiety. Last BM: loose 6/22.      INTERVENTIONS  Education--reviewed tips to increased calories/protein via 6 small feedings to meet protein goals. Gave the following handouts: High Calorie, High Protein Nutrition Therapy, Suggestions for increasing calories and protein, Underweight Nutrition Therapy.     Monitoring/Evaluation  Progress toward goals will be monitored and evaluated per protocol.     Ambika Griffiths RD, " LEATHA   7B (M-F) Pager: [phone]  RD Weekend Pager: [phone]

## 2021-06-22 NOTE — PROGRESS NOTES
GASTROENTEROLOGY PROGRESS NOTE    Date of Admission: 6/13/2021  Reason for Admission: abdominal pain, fever    Assessment:  40 year old female with a history of gallstone pancreatitis who underwent cholecystectomy (4/28) and ERCP (4/29) for choledocholithiasis at OSH who was transferred for concern of infected walled off necrotic collections.     #. Acute necrotizing pancreatitis with presumed infected walled off necrotic collections  #. Abdominal pain  #. Intermittent nausea and vomiting  Patient initially presented to OSH on 4/25 with acute abdominal pain, nausea and vomiting, lipase >5000, BISAP 1, underwent cholecystectomy as well as ERCP the following day for positive IOC. Uncomplicated hospital stay post cholecystectomy, but returned in May for nausea, vomiting and abdominal pain, found to have developing acute necrotic collections. NJT placed on 5/18 but patient unable to tolerate much due to N/V.     Now admitted with fevers and leukocytosis as well as walled off necrotic collections that are possibly source of infection. The largest collection is in the right abdomen but not in a good location for endoscopic transluminal drainage so IR placed a right sided retroperitoneal perc drain 6/15 with anmol pus - micro + for strep angionosus, enterobacter, eikenella corrodens & yeast like organisms.  ID consulted and finishing course of Micafungin and Tobra today, followed by completing course of Invanz IV.    Etiology: biliary  Date of onset: 4/25/2021  BISAP score on admission: BISAP 1 (SIRS)  Concurrent organ failure: none  Thromboses: none  Diabetes: no  Current nutrition access: NJT + oral diet  Last imaging: CT scan abd/pelv with IV contrast 6/17  Interventions:                    4/28: Cholecystectomy                    4/29: ERCP for choledocholithiasis seen on IOC         6/15: R RP Perc drain placement     Updates: >2000Kcal intake yesterday (only 1200 recorded however patient notes multiple items  missing from count). Would hold off on PEG-J for now and hopefully can discharge in the coming days with IV antibiotics.    Recommendations:  -- Continue diet advancement and recording intake   -- Hold off on feeding tube for now  -- Continue antibiotics per primary team/ID  -- Supportive care - including analgesia and anti-emetics - per primary team    The patient was discussed and plan agreed upon with GI staff, Dr Velazquez.    Yoana Abbott PA-C  Advanced Endoscopy/Pancreaticobiliary GI Service  [facility]  Pager *[phone]  Text Page  _______________________________________________________________      Subjective: Nursing notes and 24hr events reviewed.   -Afebrile  -Pain controlled  -Tolerated diet well after NJT removed. Calorie counts noted but patient reports that it wasn't accurate and an entire meal plus a shake were missing from the record.    ROS:   4 pt ROS negative unless noted in subjective.     Objective:  Blood pressure 115/76, pulse 103, temperature 97.9  F (36.6  C), temperature source Oral, resp. rate 20, height 1.829 m (6'), weight 92.9 kg (204 lb 12.8 oz), SpO2 96 %.  Gen: Sitting in bed. Appears comfortable.   HEENT: Sclera anicteric  CV: Tachycardic. No Shikha edema  Resp: Breathing comfortably on RA  Abd: Soft, non tender  Skin:  no jaundice  Ext: warm, well perfused  Mental status/Psych: A&O. Asks/answers questions appropriately   Drains: orange/purulent output in gravity bag     LABS:  BMP  Recent Labs   Lab 06/22/21  0724 06/21/21  0711 06/20/21  0842 06/19/21  0904   * 134 133 135   POTASSIUM 4.6 4.5 4.4 4.6   CHLORIDE 97 100 101 104   MARTIN 9.4 9.4 8.8 8.6   CO2 29 29 27 28   BUN 15 14 13 12   CR 0.52 0.49* 0.46* 0.43*   GLC 99 100* 111* 112*     CBC  Recent Labs   Lab 06/22/21  0724 06/21/21  0711 06/20/21  0842 06/19/21  0904   WBC 7.7 8.7 9.2 8.2   RBC 3.22* 3.31* 3.20* 3.05*   HGB 8.3* 8.5* 8.2* 7.9*   HCT 27.5* 28.7* 28.2* 27.0*   MCV 85 87 88 89   MCH  25.8* 25.7* 25.6* 25.9*   MCHC 30.2* 29.6* 29.1* 29.3*   RDW 17.3* 16.9* 16.2* 16.1*   * 581* 558* 536*     LFTs  Recent Labs   Lab 06/22/21  0724 06/21/21  0711 06/20/21  0842 06/19/21  0904   ALKPHOS 182* 208* 233* 292*   AST 20 19 23 33   ALT 27 30 38 50   BILITOTAL 0.8 0.4 0.6 0.3   PROTTOTAL 8.8 8.7 8.4 8.0   ALBUMIN 2.5* 2.4* 2.2* 2.0*      IMAGING:  CT Abdomen Pelvis w/Contrast 6/17/21:  1. Multiple infected walled off necrotic collections as described  above, decreased in size from prior exam. Interval placement of drain  in the gastrohepatic collection. No new collections.  2. Pneumobilia consistent with patient's recent sphincterotomy.  3. Mild dilatation of the common bile duct measuring up to 1.1 cm,  likely reservoir effect given history of cholecystectomy.

## 2021-06-22 NOTE — PROGRESS NOTES
Care Management Follow Up    Length of Stay (days): 8    Expected Discharge Date: 06/24/21     Concerns to be Addressed: discharge planning    Patient plan of care discussed at interdisciplinary rounds: Yes    Anticipated Discharge Disposition: Home, Home Infusion     Anticipated Discharge Services: Home with home infusion   Anticipated Discharge DME: NA    Patient/family educated on Medicare website which has current facility and service quality ratings:  NA  Education Provided on the Discharge Plan: Yes  Patient/Family in Agreement with the Plan: yes    Referrals Placed by CM/SW: Homecare  Private pay costs discussed: Not applicable    Additional Information:  Per MD team patient is anticipated to be medically ready for discharge to home on Thursday.  Patient will need to discharge on IV abx and plan is for no tube feedings.  Radha(Ph: [phone]), [facility] liaison, met with patient and went through IV abx and PICC teaching with the patient.  Met with patient at bedside to introduce RNCC role and discuss discharge planning.  Patient reports that teaching went well and she feels comfortable managing the IV abx at home with the support of home nursing.  [facility] will be providing the home nursing visits.  Patient will also be discharging with a drain, discussed with patients bedside RN to do education with the patient regarding this.  The drain will need to be irrigated, spoke with Radha from [facility] and they are able to provide the saline syringes for this if on the order.  Updated the home infusion order to include this.  Patient reports her friend will be providing transportation to home at time of discharge.  Patient had no further questions or concerns regarding discharge.  RNCC will continue to follow and assist with discharge planning.      [facility](IV abx)  Phone: [phone]  Fax: [phone]  Radha Ace, Ph: [phone]       [facility](RN  visits)  Phone: [phone]  Fax: [phone]  Sana unger RN: [phone]      [place] [facility] RNCC  Phone: [phone]  Pager: [phone]  To contact the weekend RNCC, Page: [phone]

## 2021-06-22 NOTE — PLAN OF CARE
Denies nausea and endorses fullness after eating today. States appetite has slightly improved. Rates pain 5/10 but denies any need for intervention. Pain is at insertion site of drain. Up independently to bathroom, voiding adequately. Not currently measuring. PICC to RUE with occluded gray port.     Problem: Pain Acute  Goal: Acceptable Pain Control and Functional Ability  Outcome: Improving

## 2021-06-22 NOTE — PLAN OF CARE
/87 (BP Location: Right arm)   Pulse 108   Temp 97.7  F (36.5  C) (Oral)   Resp 20   Ht 1.829 m (6')   Wt 92.9 kg (204 lb 14.4 oz)   SpO2 96%   BMI 27.79 kg/m      Neuro: A&O x4, calls appropriately  Cardiac: HR: tachycardia.   Respiratory:  sats mid 90s on RA.  GI/: +BS, +passing flatus, BM this morning. Voiding not saving.    Diet: Regular, tolerating well with supplements. On Sergei counts.   Pain: Denies pain.   IV Access: (R) TL PICC infusing TKO for abx. Caps changed today.  Drains: (R) skater drain irrigated per orders.  Activity:  Up ad shemar     Plan: Continue to encouraged PO intake to avoid additional nutrition needs/PEG tube placement.

## 2021-06-22 NOTE — PROGRESS NOTES
Calorie Count  Intake recorded for: 6/21  Total Kcals: 1250 Total Protein: 58g  Kcals from Hospital Food: 1250   Protein: 58g  Kcals from Outside Food (average):0 Protein: 0g  # Meals Ordered from Kitchen: 3  # Meals Recorded: 2 (First - 100% lemon ice, 10% hash browns)     (Second - 100% apple juice, cottage cheese, 15% black bean burger dez)  # Supplements Recorded: 100% 2 Ensure Shakes

## 2021-06-22 NOTE — PROGRESS NOTES
[facility], [place]     Daily Progress Note - [facility] Service        Date of service: 06/14/21      Assessment & Plan  Alexandra Nunez is a 40-year-old woman with history of gallstone pancreatitis s/p cholecystectomy w/ ERCP 4/2021 c/b peripancreatic fluid collection abutting the stomach necessitating NJ tube placement, who was recently admitted to [facility] for persistent abdominal pain with new concern for infected peripancreatic fluid collection. She was transferred here for further management and is now s/p RP drain placement on 6/15.    Changes Today:  - Patient remains reluctant to get PEG-J placed and would like to continue to try to eat --> discussed the importance of nutrition and the reason for recommendation  - Will plan to closely follow calorie counts today and discuss with patient and GI team this afternoon regarding feeding tube plan    Sepsis (resolved) 2/2 acute necrotizing pancreatitis with infected walled off necrotic collections  S/p RP drain placement (6/15)  Hx gallstone pancreatitis s/p cholecystectomy & ERCP (4/2021)  Initially presented with gallstone pancreatitis in 4/2021, underwent lap tracey on 4/28 and ERCP on 4/29. Recovery c/b development of acute enrrique-pancreatic fluid collections and inability to tolerate PO intake and required NJT placement 5/18 with tube feeds at home. Admitted with increased abdominal pain and fevers now s/p RP drain placed into necrotic peripancreatic fluid collection. Fluid culture growing strep anginosus, enterobacter cloacae, eikenella corrodens, candida tropicalis. CT abd/pelvis 6/17 shows improvement in fluid collection size.  - GI panc/bili following, appreciate recs  - ID consulted 6/18, appreciate recs    - Ertapenem 1g IV q24h (started 6/20)   - Fluconazole 400mg PO qday (started 6/20)   - Can discontinue Micafungin at end of day Tuesday 6/22 (this is ordered, last dose today)   - S/p tobramycin x3 doses for  synergy   - Planning for 3 weeks antibiotics   - Patient to follow up in ID clinic in 2-3 weeks, before antibiotics are finished  - Pain control: tylenol prn, oxy available though patient uses sparingly  - Anti-emetics prn    Severe malnutrition in the context of acute on chronic illness  Based on: weight loss, moderate/severe subcutaneous fat loss, moderate/severe muscle loss.  Had a discussion with the patient this morning about the importance of nutrition overall with her recovery.  She remains very reluctant to have a PEG tube placed and would like to go home with without tube feeds.  She notes that the recommendation is likely to have a feeding tube, but she wants taking try to eat enough again today.  We will plan to discuss with the GI team and follow-up this afternoon for final decision.  - Nutrition consult  - Close calorie counts today  - Discuss PEG recommendation with GI team  - Decision regarding PEG this afternoon, will plan on placing tomorrow if patient agreeable    Chronic problems:  2nd-degree AV block c/b syncopal episodes s/p pacemaker: [employer] pacemaker placed when she was 14, has had known non-functioning atrial lead for years.   Anxiety: Home xanax ordered prn.  Mild intermittent asthma: Albuterol prn.      Diet: Regular diet  DVT Prophylaxis: SubQ heparin  Code Status: Full code    Disposition:  Expected discharge: 6/24, recommended to prior living arrangement once antibiotic plan established and nutrition plan in place.    Patient was staffed with attending Dr. Gimenez.    Amy Glaser MD  Internal Medicine, PGY-2    _____________________________________________________________________    Interval History  Nursing notes reviewed. No acute events overnight.  Patient worked very hard to eat enough yesterday.  She thinks that the calorie counts are inaccurate because there was at least 1 meal slip that was not recorded and she had an additional shake was not recorded.  She is very opposed to  having a PEG tube placed and remains hopeful that she can eat enough to avoid this.  She is feeling a lot better than when she came in.  Pain is essentially resolved other than mild discomfort at the site of the RP drain associated with certain movements.    4-point ROS negative unless mentioned above.    Physical Exam  /76 (BP Location: Left arm)   Pulse 103   Temp 97.9  F (36.6  C) (Oral)   Resp 20   Ht 1.829 m (6')   Wt 92.9 kg (204 lb 12.8 oz)   SpO2 96%   BMI 27.78 kg/m    204 lbs 12.8 oz    Constitutional: awake, alert, cooperative, no acute distress  Respiratory: breathing comfortably on room air  Cardiovascular: tachycardic, regular  GI: soft, non-distended, non-tender, RP drain in place with serosanguinous output  Skin: normal skin color, texture, turgor  Musculoskeletal: no lower extremity edema present  Neurologic: alert and oriented x3, CNs II-XII grossly intact, moving all extremities equally      Labs and Imaging  All labs and imaging reviewed.

## 2021-06-23 VITALS
OXYGEN SATURATION: 98 % | HEART RATE: 104 BPM | BODY MASS INDEX: 27.75 KG/M2 | WEIGHT: 204.9 LBS | RESPIRATION RATE: 18 BRPM | HEIGHT: 72 IN | DIASTOLIC BLOOD PRESSURE: 98 MMHG | SYSTOLIC BLOOD PRESSURE: 123 MMHG | TEMPERATURE: 98.1 F

## 2021-06-23 LAB
ALBUMIN SERPL-MCNC: 2.6 G/DL (ref 3.4–5)
ALP SERPL-CCNC: 172 U/L (ref 40–150)
ALT SERPL W P-5'-P-CCNC: 27 U/L (ref 0–50)
ANION GAP SERPL CALCULATED.3IONS-SCNC: 6 MMOL/L (ref 3–14)
AST SERPL W P-5'-P-CCNC: 23 U/L (ref 0–45)
BASOPHILS # BLD AUTO: 0.1 10E9/L (ref 0–0.2)
BASOPHILS NFR BLD AUTO: 1 %
BILIRUB SERPL-MCNC: 0.5 MG/DL (ref 0.2–1.3)
BUN SERPL-MCNC: 17 MG/DL (ref 7–30)
CALCIUM SERPL-MCNC: 9.5 MG/DL (ref 8.5–10.1)
CHLORIDE SERPL-SCNC: 99 MMOL/L (ref 94–109)
CO2 SERPL-SCNC: 28 MMOL/L (ref 20–32)
CREAT SERPL-MCNC: 0.61 MG/DL (ref 0.52–1.04)
CRP SERPL-MCNC: 26 MG/L (ref 0–8)
DIFFERENTIAL METHOD BLD: ABNORMAL
EOSINOPHIL # BLD AUTO: 0.4 10E9/L (ref 0–0.7)
EOSINOPHIL NFR BLD AUTO: 6.7 %
ERYTHROCYTE [DISTWIDTH] IN BLOOD BY AUTOMATED COUNT: 17.7 % (ref 10–15)
GFR SERPL CREATININE-BSD FRML MDRD: >90 ML/MIN/{1.73_M2}
GLUCOSE SERPL-MCNC: 108 MG/DL (ref 70–99)
HCT VFR BLD AUTO: 28.1 % (ref 35–47)
HGB BLD-MCNC: 8.3 G/DL (ref 11.7–15.7)
IMM GRANULOCYTES # BLD: 0 10E9/L (ref 0–0.4)
IMM GRANULOCYTES NFR BLD: 0.5 %
LYMPHOCYTES # BLD AUTO: 1.2 10E9/L (ref 0.8–5.3)
LYMPHOCYTES NFR BLD AUTO: 18.9 %
MAGNESIUM SERPL-MCNC: 2.3 MG/DL (ref 1.6–2.3)
MCH RBC QN AUTO: 25.5 PG (ref 26.5–33)
MCHC RBC AUTO-ENTMCNC: 29.5 G/DL (ref 31.5–36.5)
MCV RBC AUTO: 87 FL (ref 78–100)
MONOCYTES # BLD AUTO: 0.5 10E9/L (ref 0–1.3)
MONOCYTES NFR BLD AUTO: 7.5 %
NEUTROPHILS # BLD AUTO: 4 10E9/L (ref 1.6–8.3)
NEUTROPHILS NFR BLD AUTO: 65.4 %
NRBC # BLD AUTO: 0 10*3/UL
NRBC BLD AUTO-RTO: 0 /100
PLATELET # BLD AUTO: 540 10E9/L (ref 150–450)
POTASSIUM SERPL-SCNC: 4.7 MMOL/L (ref 3.4–5.3)
PROT SERPL-MCNC: 8.9 G/DL (ref 6.8–8.8)
RBC # BLD AUTO: 3.25 10E12/L (ref 3.8–5.2)
SODIUM SERPL-SCNC: 133 MMOL/L (ref 133–144)
WBC # BLD AUTO: 6.1 10E9/L (ref 4–11)

## 2021-06-23 PROCEDURE — 85025 COMPLETE CBC W/AUTO DIFF WBC: CPT | Performed by: STUDENT IN AN ORGANIZED HEALTH CARE EDUCATION/TRAINING PROGRAM

## 2021-06-23 PROCEDURE — 250N000011 HC RX IP 250 OP 636: Performed by: INTERNAL MEDICINE

## 2021-06-23 PROCEDURE — 86140 C-REACTIVE PROTEIN: CPT | Performed by: STUDENT IN AN ORGANIZED HEALTH CARE EDUCATION/TRAINING PROGRAM

## 2021-06-23 PROCEDURE — 36592 COLLECT BLOOD FROM PICC: CPT | Performed by: STUDENT IN AN ORGANIZED HEALTH CARE EDUCATION/TRAINING PROGRAM

## 2021-06-23 PROCEDURE — 250N000013 HC RX MED GY IP 250 OP 250 PS 637: Performed by: INTERNAL MEDICINE

## 2021-06-23 PROCEDURE — 80053 COMPREHEN METABOLIC PANEL: CPT | Performed by: STUDENT IN AN ORGANIZED HEALTH CARE EDUCATION/TRAINING PROGRAM

## 2021-06-23 PROCEDURE — 99233 SBSQ HOSP IP/OBS HIGH 50: CPT | Performed by: PHYSICIAN ASSISTANT

## 2021-06-23 PROCEDURE — 83735 ASSAY OF MAGNESIUM: CPT | Performed by: STUDENT IN AN ORGANIZED HEALTH CARE EDUCATION/TRAINING PROGRAM

## 2021-06-23 PROCEDURE — 99239 HOSP IP/OBS DSCHRG MGMT >30: CPT | Mod: GC | Performed by: STUDENT IN AN ORGANIZED HEALTH CARE EDUCATION/TRAINING PROGRAM

## 2021-06-23 PROCEDURE — 250N000011 HC RX IP 250 OP 636: Performed by: STUDENT IN AN ORGANIZED HEALTH CARE EDUCATION/TRAINING PROGRAM

## 2021-06-23 PROCEDURE — 999N000044 HC STATISTIC CVC DRESSING CHANGE

## 2021-06-23 RX ORDER — OXYCODONE HYDROCHLORIDE 5 MG/1
5 TABLET ORAL EVERY 4 HOURS PRN
Qty: 6 TABLET | Refills: 0 | Status: SHIPPED | OUTPATIENT
Start: 2021-06-23 | End: 2021-10-22

## 2021-06-23 RX ORDER — ERTAPENEM 1 G/1
1 INJECTION, POWDER, LYOPHILIZED, FOR SOLUTION INTRAMUSCULAR; INTRAVENOUS EVERY 24 HOURS
Start: 2021-06-23 | End: 2021-07-05

## 2021-06-23 RX ORDER — FLUCONAZOLE 200 MG/1
400 TABLET ORAL DAILY
Qty: 22 TABLET | Refills: 0 | Status: SHIPPED | OUTPATIENT
Start: 2021-06-24 | End: 2021-07-05

## 2021-06-23 RX ORDER — OXYCODONE HYDROCHLORIDE 5 MG/1
5 TABLET ORAL EVERY 6 HOURS PRN
Qty: 12 TABLET | Refills: 0 | Status: SHIPPED | OUTPATIENT
Start: 2021-06-23 | End: 2021-06-26

## 2021-06-23 RX ADMIN — HEPARIN SODIUM 5000 UNITS: 5000 INJECTION, SOLUTION INTRAVENOUS; SUBCUTANEOUS at 08:30

## 2021-06-23 RX ADMIN — FLUCONAZOLE 400 MG: 200 TABLET ORAL at 08:30

## 2021-06-23 RX ADMIN — ERTAPENEM SODIUM 1 G: 1 INJECTION, POWDER, LYOPHILIZED, FOR SOLUTION INTRAMUSCULAR; INTRAVENOUS at 12:23

## 2021-06-23 RX ADMIN — ACETAMINOPHEN 650 MG: 325 TABLET, FILM COATED ORAL at 04:56

## 2021-06-23 RX ADMIN — PROCHLORPERAZINE EDISYLATE 10 MG: 5 INJECTION INTRAMUSCULAR; INTRAVENOUS at 01:34

## 2021-06-23 RX ADMIN — ALPRAZOLAM 0.5 MG: 0.25 TABLET ORAL at 06:36

## 2021-06-23 ASSESSMENT — ACTIVITIES OF DAILY LIVING (ADL)
ADLS_ACUITY_SCORE: 16

## 2021-06-23 NOTE — PLAN OF CARE
"1930-2330    /87 (BP Location: Right arm)   Pulse 108   Temp 97.7  F (36.5  C) (Oral)   Resp 20   Ht 1.829 m (6')   Wt 92.9 kg (204 lb 14.4 oz)   SpO2 96%   BMI 27.79 kg/m        Activity: up ad shemar in room   Neuros: alert and orientated x 4, calm and cooperative   Cardiac: tachycardic at times, permanent pacemaker, otherwise WNL    Respiratory: O2 sats 96% on RA, unlabored    GI/: abdomen soft/tender, last BM 6/22, voiding spont (not saving)   Diet: regular diet, calorie counts   Lines: PICC   Incisions/Drains: right abdominal drain with cloudy yellow output, irrigate q 8hrs    Labs: reviewed   Pain/nausea: denied need for pain meds, zofran x 1 for nausea with \"relief\", no emesis    New changes this shift: none    Plan: continue POC      "

## 2021-06-23 NOTE — PLAN OF CARE
Vital signs:  Temp: 99.8  F (37.7  C) Temp src: Oral BP: (!) 136/93 Pulse: 109   Resp: 18 SpO2: 98 % O2 Device: None (Room air) Oxygen Delivery: 1 LPM Height: 182.9 cm (6') Weight: 92.9 kg (204 lb 14.4 oz)    Activity: up ad shemar  Neuros: WDL  Cardiac: WDL ex tachycardic. Pacemaker in place  Respiratory: WDL, sating well on RA, denies SOB, LS clear.  GI/: LBM 6/22. Voiding spontaneously, not saving. Intermittent abd pain and nausea.   Diet: Regular and danilo counts. Tolerating well.   Lines: TL PICC SL  Incisions/Drains: R abdominal drain irrigated per orders, milky yellow output.    Pain/nausea: Compazine given x1 tylenol given x1  Plan: Continue POC.

## 2021-06-23 NOTE — DISCHARGE INSTRUCTIONS
Regarding your drain: Please record the output from your drain and when it drops below 10-20ml per day for three days in a row Call [phone] (interventional radiology team)

## 2021-06-23 NOTE — PROGRESS NOTES
GASTROENTEROLOGY PROGRESS NOTE    Date of Admission: 6/13/2021  Reason for Admission: abdominal pain, fever    Assessment:  40 year old female with a history of gallstone pancreatitis who underwent cholecystectomy (4/28) and ERCP (4/29) for choledocholithiasis at OSH who was transferred for concern of infected walled off necrotic collections.     #. Acute necrotizing pancreatitis with presumed infected walled off necrotic collections  #. Abdominal pain  #. Intermittent nausea and vomiting  Patient initially presented to OSH on 4/25 with acute abdominal pain, nausea and vomiting, lipase >5000, BISAP 1, underwent cholecystectomy as well as ERCP the following day for positive IOC. Uncomplicated hospital stay post cholecystectomy, but returned in May for nausea, vomiting and abdominal pain, found to have developing acute necrotic collections. NJT placed on 5/18 (since removed).    Patient transferred to [facility] with fevers and leukocytosis as well as walled off necrotic collections that are possibly source of infection. The largest collection is in the right abdomen but not in a good location for endoscopic transluminal drainage so IR placed a right sided retroperitoneal perc drain 6/15 with anmol pus - micro + for strep angionosus, enterobacter, eikenella corrodens & yeast like organisms.  ID consulted and finished course of Micafungin and Tobra 6/22, followed by completing course of Invanz IV as outpatient.    Etiology: biliary  Date of onset: 4/25/2021  BISAP score on admission: BISAP 1 (SIRS)  Concurrent organ failure: none  Thromboses: none  Diabetes: no  Current nutrition access: oral diet (NJT removed 6/21)  Last imaging: CT scan abd/pelv with IV contrast 6/17  Interventions:                    4/28: Cholecystectomy                    4/29: ERCP for choledocholithiasis seen on IOC         6/15: R RP Perc drain placement     Updates: no new recs, okay with discharge today/tomorrow from GI  standpoint    Recommendations:  -- Continue low fat diet  -- Hold off on feeding tube for now  -- Continue antibiotics per primary team/ID  -- Supportive care - including analgesia and anti-emetics - per primary team    GI follow up: Clinic appt with Dr. Aguiar with repeat imaging in 3-4 weeks   IR drain follow up - instructed patient to call IR when drain output drops below 10-20cc per day for three days in a row Call [phone])    The patient was discussed and plan agreed upon with GI staff, Dr Velazquez.    Yoana Abbott PA-C  Advanced Endoscopy/Pancreaticobiliary GI Service  [facility]  Pager *[phone]  Text Page  _______________________________________________________________      Subjective: Nursing notes and 24hr events reviewed.   -almost 3000kcal yesterday and felt nauseated in the evening (no vomiting)  -minimal abdominal pain (only taking tylenol, no opiates since 6/20)  -afebrile    ROS:   4 pt ROS negative unless noted in subjective.     Objective:  Blood pressure (!) 123/98, pulse 104, temperature 98.1  F (36.7  C), temperature source Oral, resp. rate 18, height 1.829 m (6'), weight 92.9 kg (204 lb 14.4 oz), SpO2 98 %.  Gen: Sitting in bed. Appears comfortable.   HEENT: Sclera anicteric  CV: Tachycardic. No Shikha edema  Resp: Breathing comfortably on RA  Abd: Soft, non tender  Skin:  no jaundice  Ext: warm, well perfused  Mental status/Psych: A&O. Asks/answers questions appropriately   Drains: yellow/cloudy output in gravity bag     LABS:  BMP  Recent Labs   Lab 06/23/21  0744 06/22/21  0724 06/21/21  0711 06/20/21  0842    131* 134 133   POTASSIUM 4.7 4.6 4.5 4.4   CHLORIDE 99 97 100 101   MARTIN 9.5 9.4 9.4 8.8   CO2 28 29 29 27   BUN 17 15 14 13   CR 0.61 0.52 0.49* 0.46*   * 99 100* 111*     CBC  Recent Labs   Lab 06/23/21  0744 06/22/21  0724 06/21/21  0711 06/20/21  0842   WBC 6.1 7.7 8.7 9.2   RBC 3.25* 3.22* 3.31* 3.20*   HGB 8.3* 8.3* 8.5* 8.2*   HCT 28.1*  27.5* 28.7* 28.2*   MCV 87 85 87 88   MCH 25.5* 25.8* 25.7* 25.6*   MCHC 29.5* 30.2* 29.6* 29.1*   RDW 17.7* 17.3* 16.9* 16.2*   * 566* 581* 558*     LFTs  Recent Labs   Lab 06/23/21  0744 06/22/21  0724 06/21/21  0711 06/20/21  0842   ALKPHOS 172* 182* 208* 233*   AST 23 20 19 23   ALT 27 27 30 38   BILITOTAL 0.5 0.8 0.4 0.6   PROTTOTAL 8.9* 8.8 8.7 8.4   ALBUMIN 2.6* 2.5* 2.4* 2.2*      IMAGING:  CT Abdomen Pelvis w/Contrast 6/17/21:  1. Multiple infected walled off necrotic collections as described  above, decreased in size from prior exam. Interval placement of drain  in the gastrohepatic collection. No new collections.  2. Pneumobilia consistent with patient's recent sphincterotomy.  3. Mild dilatation of the common bile duct measuring up to 1.1 cm,  likely reservoir effect given history of cholecystectomy.

## 2021-06-23 NOTE — PHARMACY
[facility], [facility]  Parenteral ANtibiotic Review at Departure from Acute Care [facility]     Antimicrobial Stewardship Program - A joint venture between [facility] and  Physicians to optimize antibiotic management.  NOT a formal consult - Restricted Antimicrobial Review     Patient: Alexandra Nunez  MRN: [id number]  Allergies: Nkda [no known drug allergies]    Brief Summary: Alexandra Nunez is a 40 year old female with PMHx of gallstone pancreatitis s/p cholecystectomy w/ ERCP (4/2021) c/b peripancreatic fluid collection abutting the stomach necessitation NJ placement (5/18/2021) for tube feeds who was recently admitted to [facility] for persistent abdominal pain with concerns for infected peripancreatic fluid collection, transferred to [facility] on 6/13/2021 for fluid collection management/drainage. Patient underwent IR-guided drain placement on 6/15 (drain remains in place). Edson pus noted during procedure. Cultures with Strep anginosus, Enterobacter cloacae complex, Eikenella corrodens, and several Candida species (tropicalis, glabrata, and albicans/dubliniensis). Patient was ultimately transitioned to ertapenem and fluconazole with plans to continue as outpatient.     Antimicrobial Dose/Route/Frequency Duration/Indication Start Date End Date   Ertapenem 1 g/IV/every 24 hours Tentative 3 weeks/abscess 6/15/2021 Tentative  7/5/2021   Fluconazole 400 mg/PO/every 24 hours Tentative 3 weeks/abscess 6/15/2021 Tentative   7/5/2021     Laboratory Tests: CBC with Diff, BMP, CRP   Lab Monitoring Frequency: 2x weekly   Therapeutic Drug Monitoring: none     Therapeutic Drug Monitoring Frequency: none     Miscellaneous Drug Monitoring: none      Line Type: PICC(Triple lumen, placed 6/13/2021)    Reassess Line/Pull Line Date: PICC pull line date TBD as outpatient, pending parenteral antibiotic course     First Dose Received in Controlled Setting:  Yes    Designated Provider: Dr. Johny Aguiar (GI), for specific ID-related questions, please send to [facility] (phone: [phone], fax: [phone])    Follow-up: Patient does not have ID follow-up currently - ID referral made on discharge.      Angela Woodard, PharmD, BCIDP  Pager: [phone]    Vital Signs/Clinical Features:  Vitals         06/21 0700  -  06/22 0659 06/22 0700  -  06/23 0659 06/23 0700  -  06/23 1408   Most Recent    Temp ( F) 95.7 -  99.1    97.7 -  99.8      98.1     98.1 (36.7)    Pulse 106 -  111    103 -  109      104     104    Resp 15 -  16    18 -  20      18     18    /85 -  126/83    115/76 -  146/95      123/98     123/98    SpO2 (%) 95 -  96    96 -  98      98     98            Labs  Estimated Creatinine Clearance: 153.6 mL/min (based on SCr of 0.61 mg/dL).  Recent Labs   Lab Test 06/18/21  0739 06/19/21  0904 06/20/21  0842 06/21/21  0711 06/22/21  0724 06/23/21  0744   CR 0.46* 0.43* 0.46* 0.49* 0.52 0.61       Recent Labs   Lab Test 06/18/21  0739 06/19/21  0904 06/20/21  0842 06/21/21  0711 06/22/21  0724 06/23/21  0744   WBC 10.3 8.2 9.2 8.7 7.7 6.1   ANEU 8.3 5.7 7.0 6.1 5.2 4.0   ALYM 0.8 1.1 1.0 1.3 1.3 1.2   ADRIENNE 0.5 0.6 0.5 0.6 0.6 0.5   AEOS 0.5 0.7 0.6 0.6 0.5 0.4   HGB 7.6* 7.9* 8.2* 8.5* 8.3* 8.3*   HCT 26.1* 27.0* 28.2* 28.7* 27.5* 28.1*   MCV 88 89 88 87 85 87   * 536* 558* 581* 566* 540*       Recent Labs   Lab Test 06/18/21  0739 06/19/21  0904 06/20/21  0842 06/21/21  0711 06/22/21  0724 06/23/21  0744   BILITOTAL 0.4 0.3 0.6 0.4 0.8 0.5   ALKPHOS 392* 292* 233* 208* 182* 172*   ALBUMIN 2.0* 2.0* 2.2* 2.4* 2.5* 2.6*   * 33 23 19 20 23   ALT 81* 50 38 30 27 27       Recent Labs   Lab Test 06/14/21  0647 06/18/21  0739 06/19/21  0904 06/20/21  0842 06/21/21  0711 06/22/21  0724 06/23/21  0744   PCAL 0.47  --   --   --   --   --   --    CRP  --  110.0* 68.0* 47.0* 38.0* 35.0* 26.0*             Culture Results:  7-Day Micro Results        Procedure Component Value Units Date/Time    Clostridium difficile toxin B PCR [[id number]] Collected: 06/16/21 1955    Order Status: Completed Lab Status: Final result Updated: 06/16/21 2226    Specimen: Feces      Specimen Description Feces     C Diff Toxin B PCR Negative     Comment: Negative: C. difficile target DNA sequences NOT detected, presumed negative   for C.difficile toxin B or the number of bacteria present may be below the   limit of detection for the test.  FDA approved assay performed using "[employer] GeneXpert real-time PCR.  A negative result does not exclude actual disease due to C. difficile and may   be due to improper collection, handling and storage of the specimen or the   number of organisms in the specimen is below the detection limit of the assay.                                   Recent Labs   Lab Test 06/16/21 1955   CDBPCT Negative       Imaging: Ct Abdomen Pelvis W Contrast    Result Date: 6/17/2021  EXAMINATION: CT ABDOMEN PELVIS W CONTRAST  6/17/2021 12:32 PM  CLINICAL HISTORY: Abdominal pain, fever; necrotizing pancreatitis s/p RP drain, assessing for other fluid collections COMPARISON: CT: 6/15/2021 PROCEDURE COMMENTS: CT of the abdomen was performed with iopamidol ([id number]-370) solution 131 mL intravenous and oral contrast. Coronal and sagittal reformatted images were obtained. FINDINGS: Support devices: NJ tube with tip in the small bowel distal to the ligament of Treitz, similar to prior. Additionally, there is a loop of coil tubing in the stomach. Partial visualization of right PICC with tip in the right atrium. Lower thorax: Bilateral pleural effusions with compressive atelectasis. Abdomen and pelvis: Right posterior drain with tip in the heterogenous fluid collection containing foci of gas anterior to the right kidney in the gastrohepatic region measuring approximately 5.2 x 4.1 x 4.0 cm, slightly decreased when compared to procedural CT dated 6/15/2021 at which time collection  measured 7.3 cm in greatest dimension. There is an additional fluid collection anterior to the left kidney tracking inferiorly along the left paracolic gutter measuring approximately 1.9 x 3.4 cm x 13.3 (series 2, image 54, series 4, images 54-64), decreased in size from CT dated 6/7/2021. Additionally, there are no longer foci of gas associated with this collection. Normal enhancement of the pancreas without ductal dilatation. Multiple hypodensities in the liver, compatible with hepatic cysts, the largest in segment 6 (series 2, image 22) measuring 1.1 cm in greatest dimension. Pneumobilia, new from prior. Cholecystectomy with mild dilatation of the common bile duct measuring up to 1.1 cm, likely reservoir effect. Normal spleen, kidneys and bladder. Intrauterine device. No suspicious pelvic mass. The appendix is difficult to visualize, however, no secondary signs of appendicitis. No evidence of small or large bowel obstruction. The aorta and its major branches are unremarkable. Multiple prominent but not enlarged abdominal and pelvic lymph nodes. Soft tissues: Injection granuloma seen in the anterior abdominal fat. Bones: Mild degenerative changes, most pronounced at L5-S1.     Impression: 1. Multiple infected walled off necrotic collections as described above, decreased in size from prior exam. Interval placement of drain in the gastrohepatic collection. No new collections. 2. Pneumobilia consistent with patient's recent sphincterotomy. 3. Mild dilatation of the common bile duct measuring up to 1.1 cm, likely reservoir effect given history of cholecystectomy. I have personally reviewed the examination and initial interpretation and I agree with the findings. XAVI STERN MD

## 2021-06-23 NOTE — PROGRESS NOTES
Calorie Count  Intake recorded for: 6/22  Total Kcals: 2905 Total Protein: 128g  Kcals from Hospital Food: 2905  Protein: 128g  Kcals from Outside Food (average):0 Protein: 0g  # Meals Ordered from Kitchen: 3 meals   # Meals Recorded: 3 meals (First - 100% cottage cheese, peaches, apple juice)      (Second - 100% mac & cheese, apple juice, 50% fried potatoes)      (Third - 100% peaches, 50% cheese quesadilla)  # Supplements Recorded: 100% 4 Ensure Enlive Shakes

## 2021-06-24 ENCOUNTER — TELEPHONE (OUTPATIENT)
Dept: INFECTIOUS DISEASES | Facility: CLINIC | Age: 41
End: 2021-06-24

## 2021-06-24 ENCOUNTER — PATIENT OUTREACH (OUTPATIENT)
Dept: CARE COORDINATION | Facility: CLINIC | Age: 41
End: 2021-06-24

## 2021-06-24 DIAGNOSIS — Z71.89 OTHER SPECIFIED COUNSELING: ICD-10-CM

## 2021-06-24 NOTE — PROGRESS NOTES
Clinic Care Coordination Contact      Patient has a follow up appointment with a provider within 24-48 hours of hospital discharge. Will cancel/close post-hospital call rom [facility] at this time.      Arline Yang MA  [facility]

## 2021-06-24 NOTE — DISCHARGE SUMMARY
[facility]   Discharge Summary - Medicine & Pediatrics       Date of Admission:  6/13/2021   Date of Discharge:  6/23/2021  2:11 PM  Discharging Provider: Noah Gimenez  Discharge Service: Brant Cervantes    Discharge Diagnoses   Sepsis (resolved) 2/2 acute necrotizing pancreatitis with infected walled off necrotic collections  S/p RP drain placement (6/15)  Hx gallstone pancreatitis s/p cholecystectomy & ERCP (4/2021)  Severe malnutrition in the context of acute on chronic illness    Follow-ups Needed After Discharge   Follow-up Appointments     Adult [facility] Follow-up and recommended labs and tests      - GI follow up: their clinic will call you to schedule appointment with   Dr. Aguiar with repeat imaging  - IR follow up: number provided in instructions, call when drain out   decreases to <20 ml/day  - ID follow up: referral placed, should follow up prior to the end of   antibiotic course (7/5)    Appointments on [place] and/or [facility] (with [facility] or [facility]   provider or service). Call [phone] if you haven't heard regarding   these appointments within 7 days of discharge.            Unresulted Labs Ordered in the Past 30 Days of this Admission     Date and Time Order Name Status Description    6/14/2021 1402 Fungus Culture, non-blood Preliminary       These results will be followed up by GI, ID teams in follow up.    Discharge Disposition   Discharged to home  Condition at discharge: Stable    Hospital Course   Alexandra Nunez was admitted for acute necrotizing pancreatitis.  Please see H&P from 6/13/2021 for full details of presentation. The following problems were addressed during hospitalization:    Sepsis (resolved) 2/2 acute necrotizing pancreatitis with infected walled off necrotic collections  S/p RP drain placement (6/15)  Hx gallstone pancreatitis s/p cholecystectomy & ERCP (4/2021)  Initially presented with gallstone pancreatitis in 4/2021, underwent  lap tracey on 4/28 and ERCP on 4/29. Recovery c/b development of acute enrrique-pancreatic fluid collections and inability to tolerate PO intake and required NJT placement 5/18 with tube feeds at home. Admitted with increased abdominal pain and fevers now s/p RP drain placed into necrotic peripancreatic fluid collection. Fluid culture growing strep anginosus, enterobacter cloacae, eikenella corrodens, candida tropicalis. CT abd/pelvis 6/17 shows improvement in fluid collection size.  ID was consulted here to assist with antibiotic planning.  Patient to complete 3-week course of IV ertapenem.  Also received 3 doses of tobramycin while admitted for synergy.  Oral fluconazole added for Candida growing in fluid culture.  Patient to follow-up with ID prior to the end of antibiotic course.  GI to arrange follow-up in their clinic for ongoing pancreatitis management.  Patient will discharge with therapy drain in place, she is to return to IR when the output reduces to less than 20 cc per day to arrange for follow-up visit.     Severe malnutrition in the context of acute on chronic illness  Based on: weight loss, moderate/severe subcutaneous fat loss, moderate/severe muscle loss.  Patient was on tube feeds via NJ tube at the time of admission.  Tube feeds were stopped and NGT was removed on 6/21 that he did not obtain stimulation to see if patient could maintain adequate calorie intake orally.  Able to consume between 6952-5853 danilo over the next 2 days and decision was made to discharge without tube feeding.      Consultations This Hospital Stay   GI PANCREATICOBILIARY ADULT IP CONSULT  PHARMACY TO DOSE VANCO  VASCULAR ACCESS CARE ADULT IP CONSULT  NUTRITION SERVICES ADULT IP CONSULT  PHARMACY IP CONSULT  INTERVENTIONAL RADIOLOGY ADULT/PEDS IP CONSULT  CARE MANAGEMENT / SOCIAL WORK IP CONSULT  VASCULAR ACCESS CARE ADULT IP CONSULT  INFECTIOUS DISEASE GENERAL ADULT IP CONSULT  PHARMACY TO DOSE TOBRAMYCIN    Code Status   Prior        The patient was discussed with Dr. Gimenez.    Amy Glaser MD  Internal Medicine, PGY-2  [address]  [address] [facility] [address]  Phone: [phone]  ______________________________________________________________________    Physical Exam   Vital Signs: BP (!) 123/98 (BP Location: Left arm)   Pulse 104   Temp 98.1  F (36.7  C) (Oral)   Resp 18   Ht 1.829 m (6')   Wt 92.9 kg (204 lb 14.4 oz)   SpO2 98%   BMI 27.79 kg/m    Weight: 204 lbs 14.4 oz  Constitutional: awake, alert, cooperative, no acute distress  Respiratory: breathing comfortably on room air  Cardiovascular: tachycardic, regular  GI: soft, non-distended, NJ in place, RP drain in place with serosanguinous output  Skin: normal skin color, texture, turgor  Musculoskeletal: no lower extremity edema present  Neurologic: alert and oriented x3, CNs II-XII grossly intact, moving all extremities equally      Primary Care Physician   [facility]    Discharge Orders      Home care nursing referral      Home infusion referral      Infectious Disease Referral      Reason for your hospital stay    You were hospitalized with necrotizing pancreatitis.     Adult [facility] Follow-up and recommended labs and tests    - GI follow up: their clinic will call you to schedule appointment with Dr. Aguiar with repeat imaging  - IR follow up: number provided in instructions, call when drain out decreases to <20 ml/day  - ID follow up: referral placed, should follow up prior to the end of antibiotic course (7/5)    Appointments on [place] and/or [facility] (with [facility] or [facility] provider or service). Call [phone] if you haven't heard regarding these appointments within 7 days of discharge.     Activity    Your activity upon discharge: activity as tolerated     Tubes and drains    You are going home with the following tubes or drains: retroperitoneal.  Flush the drain 3 times a day with normal saline.     IV access     **Ordering Provider MUST call/page Care Coordinator/ to discuss arranging this service**    You are going home with the following vascular access device: PICC.     Discharge Instructions    Continue to maintain eating at least 2000 calories a day. Use plenty of shakes to supplement food to keep this up.     Diet    Follow this diet upon discharge: Orders Placed This Encounter      Snacks/Supplements Adult: Other; strawberry Ensure Enlive shakes; Between Meals      Snacks/Supplements Adult: Ensure Max Protein (bariatric); With Meals      Regular Diet Adult       Significant Results and Procedures   Most Recent 3 CBC's:  Recent Labs   Lab Test 06/23/21  0744 06/22/21  0724 06/21/21  0711   WBC 6.1 7.7 8.7   HGB 8.3* 8.3* 8.5*   MCV 87 85 87   * 566* 581*     Most Recent 3 BMP's:  Recent Labs   Lab Test 06/23/21  0744 06/22/21  0724 06/21/21  0711    131* 134   POTASSIUM 4.7 4.6 4.5   CHLORIDE 99 97 100   CO2 28 29 29   BUN 17 15 14   CR 0.61 0.52 0.49*   ANIONGAP 6 5 6   MARTIN 9.5 9.4 9.4   * 99 100*   ,   Results for orders placed or performed during the hospital encounter of 06/13/21   XR Abdomen Port 1 View    Narrative    EXAMINATION:  XR ABDOMEN PORT 1 VIEWS 6/14/2021 1:38 AM     COMPARISON: Same-day radiograph of the chest    HISTORY: NJ verification    TECHNIQUE: Portable supine radiograph of the abdomen    FINDINGS:  Feeding tube tip projects over the proximal jejunum. Cholecystectomy  surgical clips. No dilated loops of bowel. No pneumatosis.      Impression    IMPRESSION:  Feeding tube tip within the proximal jejunum. Nonobstructive bowel gas  pattern.    I have personally reviewed the examination and initial interpretation  and I agree with the findings.    XAVI STERN MD   XR Chest Port 1 View    Narrative    EXAM: XR CHEST PORT 1 VIEW  6/14/2021 1:37 AM      HISTORY: picc verification    COMPARISON: Same-day radiograph of the abdomen    TECHNIQUE: AP chest  radiograph    FINDINGS:   Feeding tube passes inferiorly field-of-view. Left chest pacemaker  with leads projecting over the heart. Right chest PICC with tip  projecting over the mid right atrium.     Cardiac silhouette is within normal limits. Small left pleural  effusion with overlying, retrocardiac opacities. Silhouetting of the  left hemidiaphragm. Hazy right lung base. No pneumothorax.      Impression    IMPRESSION:  1. PICC tip projects over the mid right atrium.  2. Left lower lobe atelectasis with small effusion.  3. Likely small right pleural effusion with associated atelectasis.    I have personally reviewed the examination and initial interpretation  and I agree with the findings.    XAVI STERN MD   XR Chest Port 1 View    Narrative    EXAM: XR CHEST PORT 1 VW  6/14/2021 12:07 PM      HISTORY: Picc placement .    COMPARISON: Same day radiograph    FINDINGS: RPO radiograph of the chest. Right upper extremity PICC tip  projects over the right atrium. Partially visualized feeding tube  coursing into the duodenum and out of the field-of-view. Left chest  ICD with leads projecting over the right atrium and right ventricle  (RA lead fractured) . Right upper quadrant surgical clips.    Trachea is clear. Cardiac mediastinal silhouette is within normal  limits. Pulmonary vasculature is distinct. No appreciable  pneumothorax. Blunting of the bilateral costophrenic angles. Bibasilar  atelectasis. The upper abdomen is unremarkable.      Impression    IMPRESSION:   Right upper extremity PICC tip projects over the right atrium. Right  atrial pacer lead is fractured, unchanged.       I have personally reviewed the examination and initial interpretation  and I agree with the findings.    TOMASA ADAIR MD   CT Abdomen Peritonium Abscess Drainage    Narrative    Procedures: CT-guided abdominal fluid collection drain placement    Clinical indication: Necrotizing pancreatitis with necrotic  fluid  collections    Comparison studies: Outside CT 6/7/2021    PROCEDURE:        Staff Radiologist: Joseph Salazar MD    Fellow: Mynor Sweeney MD    Dose: 1098 mGycm    Consent: verbal and written informed consent obtained prior to  procedure.    Procedure details: Patient placed in prone position. Right flank/back  prepped and draped in standard sterile fashion. Using CT guidance, a  Freedman needle was advanced into the collection from a right  posterolateral retroperitoneal approach. The blunt stylette was  utilized after passage into the retroperitoneum. This was exchanged  over a Bentson wire for a 14 Khmer Lory dilator. This is exchanged  for a 14 Khmer Skater locking pigtail drainage catheter. 30 cc of  purulent/necrotic material was aspirated and sent for laboratory  analysis and culture. The tube was secured in place with a 2-0 Ethilon  retention suture. A sterile dressing was applied. The patient was  transferred in stable condition, having tolerated the procedure  without immediate complication.      Medications: Fentanyl 50 mcg IV, midazolam one mg IV, 1% lidocaine  (buffered with 8.4% sodium bicarbonate) for local anesthesia.     Monitoring: The patient was placed on continuous monitoring. Vital  signs and sedation monitored by nursing staff under the supervision of  the attending physician. The patient remained stable throughout the  procedure.    Sedation time: 10 minutes face-to-face    Complications: None.      Impression    IMPRESSION:     CT-guided placement of 14 Khmer drain as above.    PLAN:    Flush the drain prior orders. Follow outputs.  If output stops before then, obtain CT abdomen without contrast to  assess for residual fluid collection. If there is a residual fluid  collection, the drain may need to be replaced and/or upsized. If there  is no residual fluid collection, sinogram would be appropriate to  evaluate for drain capping/removal.    Procedure performed by   Patel under my supervision.  I, Dr. Nik Salazar, was present for the critical portion of the  procedure and was immediately available..    I have personally reviewed the examination and initial interpretation  and I agree with the findings.    NIK SALAZAR MD   XR Chest Port 1 View    Narrative    Exam: XR CHEST PORT 1 VIEW, 6/14/2021 1:37 PM    Indication: RPO view to verify PICC placement after it was pulled back    Comparison: Earlier today    Findings:   Pacemaker again seen on the left chest. The atrial lead is fractured.  Ventricular lead intact. Feeding tube seen coursing through the  mediastinum. Tip projects off the film. PICC tip in the low superior  vena cava, retracted from prior.    Heart is within normal limits. Small bilateral pleural effusions with  associated atelectasis. Pulmonary vasculature within normal limits.      Impression    Impression:   1. PICC tip now in the low superior vena cava.  2. Bilateral pleural effusions with associated atelectasis are stable.  3. Again noted fractured pacemaker lead.    XAVI STERN MD   CT Abdomen Pelvis w Contrast    Narrative    EXAMINATION: CT ABDOMEN PELVIS W CONTRAST  6/17/2021 12:32 PM      CLINICAL HISTORY: Abdominal pain, fever; necrotizing pancreatitis s/p  RP drain, assessing for other fluid collections    COMPARISON: CT: 6/15/2021    PROCEDURE COMMENTS: CT of the abdomen was performed with iopamidol  (ISOVUE-370) solution 131 mL intravenous and oral contrast. Coronal  and sagittal reformatted images were obtained.    FINDINGS:  Support devices: NJ tube with tip in the small bowel distal to the  ligament of Treitz, similar to prior. Additionally, there is a loop of  coil tubing in the stomach. Partial visualization of right PICC with  tip in the right atrium.    Lower thorax:   Bilateral pleural effusions with compressive atelectasis.    Abdomen and pelvis:  Right posterior drain with tip in the heterogenous fluid  collection  containing foci of gas anterior to the right kidney in the  gastrohepatic region measuring approximately 5.2 x 4.1 x 4.0 cm,  slightly decreased when compared to procedural CT dated 6/15/2021 at  which time collection measured 7.3 cm in greatest dimension. There is  an additional fluid collection anterior to the left kidney tracking  inferiorly along the left paracolic gutter measuring approximately 1.9  x 3.4 cm x 13.3 (series 2, image 54, series 4, images 54-64),  decreased in size from CT dated 6/7/2021. Additionally, there are no  longer foci of gas associated with this collection. Normal enhancement  of the pancreas without ductal dilatation.    Multiple hypodensities in the liver, compatible with hepatic cysts,  the largest in segment 6 (series 2, image 22) measuring 1.1 cm in  greatest dimension. Pneumobilia, new from prior. Cholecystectomy with  mild dilatation of the common bile duct measuring up to 1.1 cm, likely  reservoir effect. Normal spleen, kidneys and bladder. Intrauterine  device. No suspicious pelvic mass. The appendix is difficult to  visualize, however, no secondary signs of appendicitis. No evidence of  small or large bowel obstruction. The aorta and its major branches are  unremarkable. Multiple prominent but not enlarged abdominal and pelvic  lymph nodes.    Soft tissues: Injection granuloma seen in the anterior abdominal fat.    Bones: Mild degenerative changes, most pronounced at L5-S1.      Impression    Impression:    1. Multiple infected walled off necrotic collections as described  above, decreased in size from prior exam. Interval placement of drain  in the gastrohepatic collection. No new collections.  2. Pneumobilia consistent with patient's recent sphincterotomy.  3. Mild dilatation of the common bile duct measuring up to 1.1 cm,  likely reservoir effect given history of cholecystectomy.    I have personally reviewed the examination and initial interpretation  and I  agree with the findings.    XAVI STERN MD       Discharge Medications   Discharge Medication List as of 6/23/2021  1:33 PM      START taking these medications    Details   ertapenem (INVANZ) 1 GM vial Inject 1 g into the vein every 24 hours for 12 days, No Print Out      fluconazole (DIFLUCAN) 200 MG tablet Take 2 tablets (400 mg) by mouth daily for 11 days, Disp-22 tablet, R-0, E-Prescribe      !! oxyCODONE (ROXICODONE) 5 MG tablet Take 1 tablet (5 mg) by mouth every 4 hours as needed for moderate to severe pain, Disp-6 tablet, R-0, Local Print      !! oxyCODONE (ROXICODONE) 5 MG tablet Take 1 tablet (5 mg) by mouth every 6 hours as needed for pain, Disp-12 tablet, R-0, Local Print      sodium chloride, PF, (SALINE FLUSH) 0.9% PF flush 3 mLs by Intracatheter route every 8 hours, No Print Out       !! - Potential duplicate medications found. Please discuss with provider.      CONTINUE these medications which have NOT CHANGED    Details   albuterol (PROAIR HFA/PROVENTIL HFA/VENTOLIN HFA) 108 (90 Base) MCG/ACT inhaler Inhale 1-2 puffs into the lungs every 4 hours as needed for shortness of breath / dyspnea or wheezing, HistoricalPharmacy may dispense brand covered by insurance (Proair, or proventil or ventolin or generic albuterol inhaler)      albuterol (PROVENTIL) (2.5 MG/3ML) 0.083% neb solution Take 2.5 mg by nebulization every 4 hours as needed for shortness of breath / dyspnea or wheezing, Historical      ALPRAZolam (XANAX) 0.5 MG tablet Take 0.5 mg by mouth 2 times daily as needed for anxiety, Historical      fluticasone (FLOVENT HFA) 110 MCG/ACT inhaler Inhale 2 puffs into the lungs 2 times daily, Historical      ibuprofen (ADVIL/MOTRIN) 100 MG/5ML suspension Take 600 mg by mouth every 6 hours as needed for fever or moderate pain, Historical      levonorgestrel (MIRENA) 20 MCG/24HR IUD 1 each by Intrauterine route onceHistorical      ondansetron (ZOFRAN) 4 MG tablet Take by mouth every 6 hours as needed  for nausea, Historical      Vitamin D3 (CHOLECALCIFEROL) 25 mcg (1000 units) tablet Take 1 tablet by mouth daily, Historical         STOP taking these medications       oxyCODONE (ROXICODONE) 5 MG/5ML solution Comments:   Reason for Stopping:                Allergies      Allergies   Allergen Reactions     Nkda [No Known Drug Allergies]

## 2021-06-30 ENCOUNTER — TELEPHONE (OUTPATIENT)
Dept: INFECTIOUS DISEASES | Facility: CLINIC | Age: 41
End: 2021-06-30

## 2021-06-30 NOTE — TELEPHONE ENCOUNTER
[facility] Call Center    Phone Message    May a detailed message be left on voicemail: yes     Reason for Call: Order(s): Other:     Reason for requested: Per Simran is wanting to get a call back ASAP in regards to getting an order to Pull Patients Pic Line when Patient is done with Therapy. Simran states a referral was sent over for Pic Management. Simran is wanting to get a verbal or a written order to have the Pic Line Pulled. Please advise    Fax: [phone]    Date needed: asap    Provider name: N/A      Action Taken: Message routed to:  [facility] ID    Travel Screening: Not Applicable

## 2021-07-01 NOTE — TELEPHONE ENCOUNTER
RECORDS RECEIVED FROM: Internal   DATE RECEIVED: 07.07.2021   NOTES (Gather within 2 years) STATUS DETAILS   OFFICE NOTE from referring provider   Internal 06.13.2021 Amy Glaser MD   OFFICE NOTE from other specialist N/A    DISCHARGE SUMMARY from hospital Internal 06.13.2021   DISCHARGE REPORT from the ER N/A    LABS (any labs) Internal / CE    MEDICATION LIST Internal / CE    IMAGING  (NEED IMAGES AND REPORTS)     Osteomyelitis: Foot imaging  N/A    Liver Abscess: Abdominal imaging N/A    Other (anything related to diagnoses N/A

## 2021-07-04 ENCOUNTER — HEALTH MAINTENANCE LETTER (OUTPATIENT)
Age: 41
End: 2021-07-04

## 2021-07-05 ENCOUNTER — TELEPHONE (OUTPATIENT)
Dept: VASCULAR SURGERY | Facility: CLINIC | Age: 41
End: 2021-07-05

## 2021-07-05 DIAGNOSIS — Z11.59 ENCOUNTER FOR SCREENING FOR OTHER VIRAL DISEASES: ICD-10-CM

## 2021-07-05 DIAGNOSIS — K85.12 ACUTE BILIARY PANCREATITIS WITH INFECTED NECROSIS: Primary | ICD-10-CM

## 2021-07-05 NOTE — TELEPHONE ENCOUNTER
[facility] Call Center    Phone Message    May a detailed message be left on voicemail: yes     Reason for Call: Other: Simran following up on earlier request for orders to remove picc line. Not heard back yet, but Pt is scheduled for an in person appt in 7/7/21. Pt is due to end her antibiotics tomorrow, and they will need orders to remove her picc line tomorrow. Please review and call back or fax orders to remove picc line, or call back to confirm that picc line should be left in until 7/7/21 appt. Phone: [phone] and Fax: [phone]     Action Taken: Message routed to:  [facility] ID    Travel Screening: Not Applicable

## 2021-07-05 NOTE — TELEPHONE ENCOUNTER
I called and left a voicemail including my call back number.  I called to find out more about pt's drain.    SUE Whatley RN, BSN  Interventional Radiology Nurse Coordinator   Phone:  [phone]

## 2021-07-07 ENCOUNTER — VIRTUAL VISIT (OUTPATIENT)
Dept: INFECTIOUS DISEASES | Facility: CLINIC | Age: 41
End: 2021-07-07
Attending: INTERNAL MEDICINE
Payer: COMMERCIAL

## 2021-07-07 ENCOUNTER — PRE VISIT (OUTPATIENT)
Dept: INFECTIOUS DISEASES | Facility: CLINIC | Age: 41
End: 2021-07-07

## 2021-07-07 DIAGNOSIS — A49.9 POLYMICROBIAL BACTERIAL INFECTION: ICD-10-CM

## 2021-07-07 DIAGNOSIS — K85.91 ACUTE NECROTIZING PANCREATITIS: ICD-10-CM

## 2021-07-07 DIAGNOSIS — Z79.2 RECEIVING INTRAVENOUS ANTIBIOTIC TREATMENT AT HOME: ICD-10-CM

## 2021-07-07 DIAGNOSIS — K85.90 PANCREATIC ABSCESS: Primary | ICD-10-CM

## 2021-07-07 PROCEDURE — 99214 OFFICE O/P EST MOD 30 MIN: CPT | Mod: 95 | Performed by: INTERNAL MEDICINE

## 2021-07-07 SDOH — HEALTH STABILITY: MENTAL HEALTH: HOW OFTEN DO YOU HAVE 6 OR MORE DRINKS ON ONE OCCASION?: NOT ASKED

## 2021-07-07 SDOH — HEALTH STABILITY: MENTAL HEALTH: HOW MANY STANDARD DRINKS CONTAINING ALCOHOL DO YOU HAVE ON A TYPICAL DAY?: NOT ASKED

## 2021-07-07 SDOH — HEALTH STABILITY: MENTAL HEALTH: HOW OFTEN DO YOU HAVE A DRINK CONTAINING ALCOHOL?: NOT ASKED

## 2021-07-07 NOTE — PROGRESS NOTES
Alexandra Nunez is a 41 year old woman who is being evaluated via a billable video visit.      How would you like to obtain your AVS? MyChart    Will anyone else be joining your video visit? No    Video Start Time: 7:24  Video-Visit Details    Type of service:  Video Visit    Video End Time:7:37 PM    Originating Location (pt. Location): Home    Distant Location (provider location):  [facility] INFECTIOUS DISEASE CLINIC [place]     Platform used for Video Visit: [facility]     Division of Infectious Diseases and [facility] Medicine  Department of Medicine        INFECTIOUS DISEASE CLINIC OUTPATIENT VIDEO VISIT NOTE [place] [address]  [address] [place] [address]  Office: [phone]  Fax:  [phone]     HISTORY OF PRESENT ILLNESS:  Ms. Alexandra Nunez is a 41-year-old woman who is seen today in Infectious Disease Clinic via a Virtual Video Visit for post-hospitalization follow-up after a recent 6/14 - 23/21 [facility] Medicine service hospitalization for sepsis due to acute necrotizing pancreatitis with infected, walled-off necrotic pancreatic fluid collections.  Preceding that, she had a history of gallstone pancreatitis s/p cholecystectomy with an ERCP in 4/2021 which was complicated by a 5 cm peripancreatic fluid collection abutting her stomach necessitating placement of an NJ tube on 5/18/21 for tube feeding.  Subsequently, she was admitted on 6/5/21 to [facility] with persistent abdominal pain and a new peripancreatic fluid collection with suspicion that collection was infected.  She ws transferred to [facility] on 6/14/21 to undergo drainage of that fluid collection in the right parapancreatic gutter, into which a drain was placed on 6/15/21.  That drain remains in place now.  At the time of transfer and during her hospitalizations, she had substantial periumbilical abdominal pain requiring narcotics for relief and initially had persisting fevers of 102 - 104 degrees F at the [facility], although had finally defervesced prior to her transfer to [facility].  Abdominal imaging on 6/7/21 6/7 demonstrated a 6.3 cm fluid collection in the pancreas.  Blood cultures in [place] grew Strep anginosus.  She was treated with IV empiric vancomycin plus meropenem in [place].  The pancreatic drainage cultures from 6/15/21 at [facility] isolated polymicrobial growth, including Strep anginosus, pan-susceptible Enterobacter cloacae, pan-susceptible Eikenella corrodens, Candida tropicalis, Candida dubliniensis, Candida albicans, Candida glabrata, and  mixed anaerobic gayle.  Following her ERCP and drainage at [facility] on 6/15/21, she improved steadily.  The feeding tube was removed during the hospitalization.  She was treated with ertapenem and micafungin during her [facility] stay and was discharged on ertapenem 1 gram daily IV to complete a three week course through 7/6/21 along with oral fluconazole 400 mg PO daily (also until 7/6/21).  She reports today that she tolerated the IV ertapenem infusions and the oral fluconazole without perceived side effects or other difficulty.  She received the final dose of IV ertapenem yesterday, as originally planned (although we had communicated that it was to be extended until after this appointment) and also took the final fluconazole dose yesterday, as well.  She says that since her [facility] discharge on 6/23/21, she has been \"doing pretty good\" and feeling better steadily.  She has not had any fever, chills, or night sweats since before discharge.  She has increasing energy levels.  She has no abdominal pain any more most of the time, although still feels some mild epigastric pain occasionally when she eats something that \"does not agree with my stomach\".. For that reason, she continues to try to eat mostly bland foods.  She still lacks an appetite and has to \"force\" herself to eat.  She has had no emesis since discharge and very little nausea anymore -- only \"slight\" nausea " occasionally depending on what and how much she eats.  She lacks diarrhea.  Her drain remains in place but has had almost no drainage in recent days.  She has an appointment in [facility] Interventional Radiology for a sinogram and possible drain removal on 7/12/21.  She was to have serial serum CRP values and other monitoring labs drawn, but I do not presently see those available in her chart.  She has had weekly home nurse visits and the note dated yesterday confirms that she has been doing well of late.  Beyond this issue, she otherwise feels stable of late and lacks additional concerns or complaints today, including no recent febrile or EENT symptoms, cough, dyspnea, chest pain, GI or  symptoms, skin concerns, or neurological symptoms.    PAST MEDICAL HISTORY:  Necrotizing pancreatitis.    ALLERGIES:  Allergies   Allergen Reactions     Nkda [No Known Drug Allergies]      MEDICATIONS:  Current Outpatient Medications   Medication Sig Dispense Refill     albuterol (PROAIR HFA/PROVENTIL HFA/VENTOLIN HFA) 108 (90 Base) MCG/ACT inhaler Inhale 1-2 puffs into the lungs every 4 hours as needed for shortness of breath / dyspnea or wheezing       albuterol (PROVENTIL) (2.5 MG/3ML) 0.083% neb solution Take 2.5 mg by nebulization every 4 hours as needed for shortness of breath / dyspnea or wheezing       ALPRAZolam (XANAX) 0.5 MG tablet Take 0.5 mg by mouth 2 times daily as needed for anxiety       fluticasone (FLOVENT HFA) 110 MCG/ACT inhaler Inhale 2 puffs into the lungs 2 times daily       ibuprofen (ADVIL/MOTRIN) 100 MG/5ML suspension Take 600 mg by mouth every 6 hours as needed for fever or moderate pain       levonorgestrel (MIRENA) 20 MCG/24HR IUD 1 each by Intrauterine route once       ondansetron (ZOFRAN) 4 MG tablet Take by mouth every 6 hours as needed for nausea       oxyCODONE (ROXICODONE) 5 MG tablet Take 1 tablet (5 mg) by mouth every 4 hours as needed for moderate to severe pain 6 tablet 0     sodium chloride,  PF, (SALINE FLUSH) 0.9% PF flush 3 mLs by Intracatheter route every 8 hours       Vitamin D3 (CHOLECALCIFEROL) 25 mcg (1000 units) tablet Take 1 tablet by mouth daily       SOCIAL HISTORY:  Social History     Socioeconomic History     Marital status: Single     Spouse name: Not on file     Number of children: Not on file     Years of education: Not on file     Highest education level: Not on file   Occupational History     Not on file   Social Needs     Financial resource strain: Not on file     Food insecurity     Worry: Not on file     Inability: Not on file     Transportation needs     Medical: Not on file     Non-medical: Not on file   Tobacco Use     Smoking status: Never Smoker     Smokeless tobacco: Never Used   Substance and Sexual Activity     Alcohol use: Not Currently     Drug use: Not Currently     Sexual activity: Not on file   Lifestyle     Physical activity     Days per week: Not on file     Minutes per session: Not on file     Stress: Not on file   Relationships     Social connections     Talks on phone: Not on file     Gets together: Not on file     Attends [employer] service: Not on file     Active member of club or organization: Not on file     Attends meetings of clubs or organizations: Not on file     Relationship status: Not on file     Intimate partner violence     Fear of current or ex partner: Not on file     Emotionally abused: Not on file     Physically abused: Not on file     Forced sexual activity: Not on file   Other Topics Concern     Not on file   Social History Narrative     Not on file   Lives in [place], [place], with her mother and brother.    REVIEW OF SYSTEMS:  As per HPI.    PHYSICAL EXAMINATION:   Vital signs:  Respirations appear normal at ~ 16.  There were no other vitals taken for this video visit.  Some aspects of the Physical Exam were unobtainable in the context of this Video Visit, but the following was ascertained:  GENERAL:  Pleasant, conversant, calm, comfortable-sounding  41 year old woman in NAD.  HEAD:  NCAT.  EYES:  EOMI, sclerae anicteric, conjunctivae pink without visible injection.  ENT:  External auditory canals appear patent without discharge.  No evident hearing deficit.  Normal tone of voice and volume.  No visible external oral lesion.  NECK:  Supple, no visible goiter.  RESP: No cough, no audible wheezing.  Able to talk in full sentences.  SKIN:  No visible rash or lesion on video.  NEURO:  Alert, oriented x 3, memory / cognition normal, moves arms normally x 2.  PSYCH:  Normal affect, coherent speech, able to articulate logical thoughts and reason, no tangential thoughts, no hallucinations.    LABORATORY STUDIES (Reviewed with the patient during her appointment today):    Outpatient laboratory studies since her 6/23/21 discharge are not available to me.     WBC 6.1 06/23/2021      RBC 3.25 06/23/2021      HGB 8.3 06/23/2021      HCT 28.1 06/23/2021       06/23/2021 06/23/21  0744 06/22/21  0724   CRP 26.0* 35.0*      06/23/21  0744 06/22/21  0724    131*   POTASSIUM 4.7 4.6   CHLORIDE 99 97   CO2 28 29   ANIONGAP 6 5   * 99   BUN 17 15   CR 0.61 0.52   MRATIN 9.5 9.4     IMPRESSION/PLAN:  A 41-year-old woman who is seen for post-hospitalization follow-up after 6/5 - 14/21 [facility] and 6/14 - 23/21 [facility] hospitalizations for sepsis due to acute necrotizing pancreatitis with infected, walled-off necrotic pancreatic fluid collections.  She was discharged on home IV ertapenem and oral fluconazole, both of which she received to complete a three week course through 7/6/21.  She has continued to improve and overall has done well since discharge, although still has some slight, occasional, residual periumbilical abdominal discomfort and nausea with ongoing low appetite and an incompletely reconstituted energy level.  Her percutaneous drain has mostly stopped draining and will be re-evaluation with a sinogram for possible removal by [facility]  Interventional Radiology next week on 7/12/21.  At this point, discontinuing the antimicrobial therapy seems appropriate.  I will concur with stopping the ertapenem in a note to [facility] and will request removal of her PICC line as soon as feasible.  She likely will not need further follow-up appointments in ID Clinic, but I reviewed with her signs to watch for (such as fever, night sweats, increased abdominal pain or nausea, a sudden persisting drop in energy level, etc.) which might connote a recrudescence of her pancreatitis or pancreatic infection.  If those occur, she should contact her primary care provider or the ID Clinic forthwith.

## 2021-07-07 NOTE — LETTER
7/7/2021      RE: Alexandra Nunez  [address] [place] [address]       Alexandra Nunez is a 41 year old woman who is being evaluated via a billable video visit.      How would you like to obtain your AVS? MyChart    Will anyone else be joining your video visit? No    Video Start Time: 7:24  Video-Visit Details    Type of service:  Video Visit    Video End Time:7:37 PM    Originating Location (pt. Location): Home    Distant Location (provider location):  [facility] INFECTIOUS DISEASE CLINIC [place]     Platform used for Video Visit: [facility]     Division of Infectious Diseases and [facility] Medicine  Department of Medicine        INFECTIOUS DISEASE CLINIC OUTPATIENT VIDEO VISIT NOTE [place] Mail [address]  [address] [place] [address]  Office: [phone]  Fax:  [phone]     HISTORY OF PRESENT ILLNESS:  Ms. Alexandra Nunez is a 41-year-old woman who is seen today in Infectious Disease Clinic via a Virtual Video Visit for post-hospitalization follow-up after a recent 6/14 - 23/21 [facility] Medicine service hospitalization for sepsis due to acute necrotizing pancreatitis with infected, walled-off necrotic pancreatic fluid collections.  Preceding that, she had a history of gallstone pancreatitis s/p cholecystectomy with an ERCP in 4/2021 which was complicated by a 5 cm peripancreatic fluid collection abutting her stomach necessitating placement of an NJ tube on 5/18/21 for tube feeding.  Subsequently, she was admitted on 6/5/21 to [facility] with persistent abdominal pain and a new peripancreatic fluid collection with suspicion that collection was infected.  She ws transferred to [facility] on 6/14/21 to undergo drainage of that fluid collection in the right parapancreatic gutter, into which a drain was placed on 6/15/21.  That drain remains in place now.  At the time of transfer and during her hospitalizations, she had substantial periumbilical abdominal pain requiring narcotics for  "relief and initially had persisting fevers of 102 - 104 degrees F at the [facility], although had finally defervesced prior to her transfer to [facility].  Abdominal imaging on 6/7/21 6/7 demonstrated a 6.3 cm fluid collection in the pancreas.  Blood cultures in [place] grew Strep anginosus.  She was treated with IV empiric vancomycin plus meropenem in [place].  The pancreatic drainage cultures from 6/15/21 at [facility] isolated polymicrobial growth, including Strep anginosus, pan-susceptible Enterobacter cloacae, pan-susceptible Eikenella corrodens, Candida tropicalis, Candida dubliniensis, Candida albicans, Candida glabrata, and  mixed anaerobic gayle.  Following her ERCP and drainage at [facility] on 6/15/21, she improved steadily.  The feeding tube was removed during the hospitalization.  She was treated with ertapenem and micafungin during her [facility] stay and was discharged on ertapenem 1 gram daily IV to complete a three week course through 7/6/21 along with oral fluconazole 400 mg PO daily (also until 7/6/21).  She reports today that she tolerated the IV ertapenem infusions and the oral fluconazole without perceived side effects or other difficulty.  She received the final dose of IV ertapenem yesterday, as originally planned (although we had communicated that it was to be extended until after this appointment) and also took the final fluconazole dose yesterday, as well.  She says that since her [facility] discharge on 6/23/21, she has been \"doing pretty good\" and feeling better steadily.  She has not had any fever, chills, or night sweats since before discharge.  She has increasing energy levels.  She has no abdominal pain any more most of the time, although still feels some mild epigastric pain occasionally when she eats something that \"does not agree with my stomach\".. For that reason, she continues to try to eat mostly bland foods.  She still lacks an appetite and has to \"force\" herself to eat.  She has had no emesis " "since discharge and very little nausea anymore -- only \"slight\" nausea occasionally depending on what and how much she eats.  She lacks diarrhea.  Her drain remains in place but has had almost no drainage in recent days.  She has an appointment in [facility] Interventional Radiology for a sinogram and possible drain removal on 7/12/21.  She was to have serial serum CRP values and other monitoring labs drawn, but I do not presently see those available in her chart.  She has had weekly home nurse visits and the note dated yesterday confirms that she has been doing well of late.  Beyond this issue, she otherwise feels stable of late and lacks additional concerns or complaints today, including no recent febrile or EENT symptoms, cough, dyspnea, chest pain, GI or  symptoms, skin concerns, or neurological symptoms.    PAST MEDICAL HISTORY:  Necrotizing pancreatitis.    ALLERGIES:  Allergies   Allergen Reactions     Nkda [No Known Drug Allergies]      MEDICATIONS:  Current Outpatient Medications   Medication Sig Dispense Refill     albuterol (PROAIR HFA/PROVENTIL HFA/VENTOLIN HFA) 108 (90 Base) MCG/ACT inhaler Inhale 1-2 puffs into the lungs every 4 hours as needed for shortness of breath / dyspnea or wheezing       albuterol (PROVENTIL) (2.5 MG/3ML) 0.083% neb solution Take 2.5 mg by nebulization every 4 hours as needed for shortness of breath / dyspnea or wheezing       ALPRAZolam (XANAX) 0.5 MG tablet Take 0.5 mg by mouth 2 times daily as needed for anxiety       fluticasone (FLOVENT HFA) 110 MCG/ACT inhaler Inhale 2 puffs into the lungs 2 times daily       ibuprofen (ADVIL/MOTRIN) 100 MG/5ML suspension Take 600 mg by mouth every 6 hours as needed for fever or moderate pain       levonorgestrel (MIRENA) 20 MCG/24HR IUD 1 each by Intrauterine route once       ondansetron (ZOFRAN) 4 MG tablet Take by mouth every 6 hours as needed for nausea       oxyCODONE (ROXICODONE) 5 MG tablet Take 1 tablet (5 mg) by mouth every 4 hours " as needed for moderate to severe pain 6 tablet 0     sodium chloride, PF, (SALINE FLUSH) 0.9% PF flush 3 mLs by Intracatheter route every 8 hours       Vitamin D3 (CHOLECALCIFEROL) 25 mcg (1000 units) tablet Take 1 tablet by mouth daily       SOCIAL HISTORY:  Social History     Socioeconomic History     Marital status: Single     Spouse name: Not on file     Number of children: Not on file     Years of education: Not on file     Highest education level: Not on file   Occupational History     Not on file   Social Needs     Financial resource strain: Not on file     Food insecurity     Worry: Not on file     Inability: Not on file     Transportation needs     Medical: Not on file     Non-medical: Not on file   Tobacco Use     Smoking status: Never Smoker     Smokeless tobacco: Never Used   Substance and Sexual Activity     Alcohol use: Not Currently     Drug use: Not Currently     Sexual activity: Not on file   Lifestyle     Physical activity     Days per week: Not on file     Minutes per session: Not on file     Stress: Not on file   Relationships     Social connections     Talks on phone: Not on file     Gets together: Not on file     Attends Mu-ism service: Not on file     Active member of club or organization: Not on file     Attends meetings of clubs or organizations: Not on file     Relationship status: Not on file     Intimate partner violence     Fear of current or ex partner: Not on file     Emotionally abused: Not on file     Physically abused: Not on file     Forced sexual activity: Not on file   Other Topics Concern     Not on file   Social History Narrative     Not on file   Lives in [place], [place], with her mother and brother.    REVIEW OF SYSTEMS:  As per HPI.    PHYSICAL EXAMINATION:   Vital signs:  Respirations appear normal at ~ 16.  There were no other vitals taken for this video visit.  Some aspects of the Physical Exam were unobtainable in the context of this Video Visit, but the following was  ascertained:  GENERAL:  Pleasant, conversant, calm, comfortable-sounding 41 year old woman in NAD.  HEAD:  NCAT.  EYES:  EOMI, sclerae anicteric, conjunctivae pink without visible injection.  ENT:  External auditory canals appear patent without discharge.  No evident hearing deficit.  Normal tone of voice and volume.  No visible external oral lesion.  NECK:  Supple, no visible goiter.  RESP: No cough, no audible wheezing.  Able to talk in full sentences.  SKIN:  No visible rash or lesion on video.  NEURO:  Alert, oriented x 3, memory / cognition normal, moves arms normally x 2.  PSYCH:  Normal affect, coherent speech, able to articulate logical thoughts and reason, no tangential thoughts, no hallucinations.    LABORATORY STUDIES (Reviewed with the patient during her appointment today):    Outpatient laboratory studies since her 6/23/21 discharge are not available to me.     WBC 6.1 06/23/2021      RBC 3.25 06/23/2021      HGB 8.3 06/23/2021      HCT 28.1 06/23/2021       06/23/2021 06/23/21  0744 06/22/21  0724   CRP 26.0* 35.0*      06/23/21  0744 06/22/21  0724    131*   POTASSIUM 4.7 4.6   CHLORIDE 99 97   CO2 28 29   ANIONGAP 6 5   * 99   BUN 17 15   CR 0.61 0.52   MARTIN 9.5 9.4     IMPRESSION/PLAN:  A 41-year-old woman who is seen for post-hospitalization follow-up after 6/5 - 14/21 [facility] and 6/14 - 23/21 [facility] hospitalizations for sepsis due to acute necrotizing pancreatitis with infected, walled-off necrotic pancreatic fluid collections.  She was discharged on home IV ertapenem and oral fluconazole, both of which she received to complete a three week course through 7/6/21.  She has continued to improve and overall has done well since discharge, although still has some slight, occasional, residual periumbilical abdominal discomfort and nausea with ongoing low appetite and an incompletely reconstituted energy level.  Her percutaneous drain has mostly stopped draining and will  be re-evaluation with a sinogram for possible removal by [facility] Interventional Radiology next week on 7/12/21.  At this point, discontinuing the antimicrobial therapy seems appropriate.  I will concur with stopping the ertapenem in a note to [facility] and will request removal of her PICC line as soon as feasible.  She likely will not need further follow-up appointments in ID Clinic, but I reviewed with her signs to watch for (such as fever, night sweats, increased abdominal pain or nausea, a sudden persisting drop in energy level, etc.) which might connote a recrudescence of her pancreatitis or pancreatic infection.  If those occur, she should contact her primary care provider or the ID Clinic forthwith.      Abdulkadir Vazquez MD

## 2021-07-08 NOTE — TELEPHONE ENCOUNTER
Called Simran to let her know Dr Vazquez recommends stopping pt's IV Abx and pulling pt's PICC. Sirman verbalized understanding of the plan.  Mary Vale RN

## 2021-07-08 NOTE — TELEPHONE ENCOUNTER
[facility] Call Center    Phone Message    May a detailed message be left on voicemail: yes     Reason for Call: Appointment Intake    Referring Provider Name: Dr Amy Howell at [facility]  Diagnosis and/or Symptoms: Per hospital discharge instructions, needs follow up prior to 7/5 (end of antibiotic round) to follow up necrotizing pancreatitis.     Action Taken: Message routed to:  [facility] Infectious Disease    Travel Screening: Not Applicable                                                                        
Pt saw Dr Vazquez on 7/7.  Mary Vale RN    
impairments found/functional limitations in following categories

## 2021-07-08 NOTE — PROGRESS NOTES
Infectious Disease Clinic MD Telephone Order Note:    It turns out that Ms. Nunez's home ertapenem therapy was provided through [facility] and not [facility] (see my clinic note from yesterday).  The most recent home health RN to see Ms. Nunez was (according to [facility] note) MECHELLE Correia RN, on 7/6/21.  So I phoned the [facility] main number ([phone]) this afternoon and left a voice mail message (since there is no direct answer) with an order for removal of Ms. Nunez's PICC line.

## 2021-07-12 ENCOUNTER — TELEPHONE (OUTPATIENT)
Dept: GASTROENTEROLOGY | Facility: CLINIC | Age: 41
End: 2021-07-12

## 2021-07-12 ENCOUNTER — ANCILLARY PROCEDURE (OUTPATIENT)
Dept: CT IMAGING | Facility: CLINIC | Age: 41
End: 2021-07-12
Attending: RADIOLOGY
Payer: COMMERCIAL

## 2021-07-12 ENCOUNTER — ANCILLARY PROCEDURE (OUTPATIENT)
Dept: GENERAL RADIOLOGY | Facility: CLINIC | Age: 41
End: 2021-07-12
Attending: RADIOLOGY
Payer: COMMERCIAL

## 2021-07-12 DIAGNOSIS — K85.12 ACUTE BILIARY PANCREATITIS WITH INFECTED NECROSIS: ICD-10-CM

## 2021-07-12 PROCEDURE — 74177 CT ABD & PELVIS W/CONTRAST: CPT | Mod: GC | Performed by: STUDENT IN AN ORGANIZED HEALTH CARE EDUCATION/TRAINING PROGRAM

## 2021-07-12 PROCEDURE — 76080 X-RAY EXAM OF FISTULA: CPT | Performed by: PHYSICIAN ASSISTANT

## 2021-07-12 PROCEDURE — 49424 ASSESS CYST CONTRAST INJECT: CPT | Performed by: PHYSICIAN ASSISTANT

## 2021-07-12 RX ORDER — IOPAMIDOL 755 MG/ML
126 INJECTION, SOLUTION INTRAVASCULAR ONCE
Status: COMPLETED | OUTPATIENT
Start: 2021-07-12 | End: 2021-07-12

## 2021-07-12 RX ADMIN — IOPAMIDOL 126 ML: 755 INJECTION, SOLUTION INTRAVASCULAR at 13:37

## 2021-07-12 NOTE — TELEPHONE ENCOUNTER
Called Pt to make sure Pt is aware of her upcoming appt with Dr. Aguiar on 7/19/21. Pt is aware and will be at virtual appointment. Pt has CT scheduled today at 1:30 pm.    Geoff Cook LPN

## 2021-07-13 DIAGNOSIS — Z11.59 ENCOUNTER FOR SCREENING FOR OTHER VIRAL DISEASES: ICD-10-CM

## 2021-07-13 LAB
FUNGUS SPEC CULT: ABNORMAL
SPECIMEN SOURCE: ABNORMAL

## 2021-07-15 ENCOUNTER — TELEPHONE (OUTPATIENT)
Dept: GASTROENTEROLOGY | Facility: CLINIC | Age: 41
End: 2021-07-15

## 2021-07-15 NOTE — TELEPHONE ENCOUNTER
[facility] Call Center    Phone Message    May a detailed message be left on voicemail: yes     Reason for Call: Other: Per Rep. stated that they have sent orders to  4 time since 06/24 about pt orders for 2 different kind of medication. Per Rep. would like if  can sign and send them back to fax # [phone]. Writer has confirm the fax number that rep. was sending to and it is correct. Please call Rep. back at  for any questions. Thank you.      Action Taken: Message routed to:  [facility] Radha and Chris    Travel Screening: Not Applicable

## 2021-07-15 NOTE — TELEPHONE ENCOUNTER
Returned call. We have not seen patient in clinic, scheduled 7/19. Explained we cannot sign orders for patients we have not seen, was discharged on 6/23, likely follow up from inpatient stay.    ML

## 2021-07-19 ENCOUNTER — TELEPHONE (OUTPATIENT)
Dept: INTERVENTIONAL RADIOLOGY/VASCULAR | Facility: CLINIC | Age: 41
End: 2021-07-19

## 2021-07-19 ENCOUNTER — VIRTUAL VISIT (OUTPATIENT)
Dept: GASTROENTEROLOGY | Facility: CLINIC | Age: 41
End: 2021-07-19
Payer: COMMERCIAL

## 2021-07-19 VITALS — BODY MASS INDEX: 27.09 KG/M2 | HEIGHT: 72 IN | WEIGHT: 200 LBS

## 2021-07-19 DIAGNOSIS — K85.12 ACUTE BILIARY PANCREATITIS WITH INFECTED NECROSIS: Primary | ICD-10-CM

## 2021-07-19 PROCEDURE — 99202 OFFICE O/P NEW SF 15 MIN: CPT | Mod: 95 | Performed by: INTERNAL MEDICINE

## 2021-07-19 ASSESSMENT — MIFFLIN-ST. JEOR: SCORE: 1674.19

## 2021-07-19 NOTE — LETTER
7/19/2021         RE: Alexandra Nunez  [address] [place] [address]        Dear Colleague,    Thank you for referring your patient, Alexandra Nunez, to the [facility] [place]. Please see a copy of my visit note below.     Alexandra is very pleasant 40 yo following up after 10 day hospitalization for infected necrotizing pancreatitis. Post cholecystectomy and ERCP at presentation, ref to U when she developed sepsis due to wallled off necrosis. Treated w R retropeitoineal drain. Now in about a month out, had capped her drain as told by IR> However, in last week, has had midepigastric pain, on and off, without fever. Worling on getting energy back    We spent 20 minutes reviewing her sinogram and CT done this week, which are below    IMP: Pain may or may not be related to capping tube, residual small collections.  REC:  1) Open drain to bag, flush w 10cc bid as before, see what comes out and if pain improved  2) Follow-up with scheduled sinogram and CT 7/22  3) Brief follow-up w me next week in virtual clinic        Narrative & Impression   DIAGNOSIS: Necrotizing pancreatitis with necrotic fluid collection     PROCEDURE: Sinogram                                                                   IMPRESSION: Small bowel fistula.       PLAN:   1. Drainage catheter has been capped.  2.  Extra collection bag given to the patient. The patient was  instructed to reattach the collection bag if she develops abdominal  pain fevers or chills.  3.  Repeat CT scan of the abdomen/pelvis and diagnostic sinogram in 7  days at the [facility]. The patient understands she may need a red  rubber montenegro catheter over a series of visits.   ----------     CLINICAL HISTORY: 40-year-old female with a history of gallstone  pancreatitis status post cholecystectomy on April 28, 2021 and ERCP on  April 29, 2021 for choledocholithiasis at outside hospital was  admitted on June 13, 2021 to June 23,  2021 for acute necrotizing  pancreatitis with infected walled off necrotic collection.  Pt is status post IR drainage and placement of 14 Cook Islander catheter on  Adry 15, 2021 (fluid cultures showing beta hemolytic streptococcus  anginsosus, Enterobacter Cloacae complex, Candida tropicalis, candida  dubliniensis; fluid lipase 69) presents for CT scan and sinogram  today.      The patient reports that there has been no output for over a week. The  patient flushes 10 mL three times a day and has had NET zero output  for at least a week. She denies any fevers or chills, leaking around  the catheter, trauma to the catheter.      She recently saw Infectious Disease on 7/7/2021 and has had IV  antibiotics stopped with her PICC line removed.     CT scan reviewed and demonstrated near complete resolution of fluid  collection around drainage catheter with catheter in good position and  in close proximity to small bowel.  There is also decreased size of  intra-abdominal collections.  See CT today for formal report.       PERFORMED BY: Danica Licea PA-C     FINDINGS:     Abdomen and pelvis: Sequela of necrotizing pancreatitis with multiple  complex walled off collections with placement of right posterior  lateral approach percutaneous drain in the right hemiabdomen anterior  pararenal space, adjacent to the pancreatic head. Decrease in size of  fluid collection adjacent to the distal pigtail catheter, measuring  about 3.2 x 1.2 cm compared to 4.7 x 1.6 cm.. This collection was  shown to fistulization with the small bowel on same-day performed  sinogram. Fluid collection anterior to the left kidney extending along  the anterior pararenal space (series 3, image 221 and series 4 image  58-60) measures approximately 1.9 x 2.8 x 12 cm, previously 1.9 x 3.7  x 15 cm. Fluid collection superior to the celiac trunk abutting the  caudate lobe of the liver measures 1.5 x 2.7 x 2.7 cm, previously 1.6  x 3.2 x 2.6 cm. No new fluid collection.  Prominent mesenteric and  retroperitoneal lymph nodes are decreased since 6/17/2021.     Focal fatty infiltration along the falciform ligament. Multiple  hepatic cysts are unchanged. Cholecystectomy. No abnormal enhancement  of the pancreatic parenchyma. Spleen, adrenal glands, kidneys,  ureters, and urinary bladder are unremarkable. Intrauterine device  adequately positioned. No dilated bowel. Formed stool in the rectum.  Appendix is normal. No free air. Major abdominal pelvic vessels are  normal.     Lung bases: Clear. Partial visualized pacemaker leads.     Bones and soft tissues: No acute or suspicious osseous lesion.                                                                      IMPRESSION:  1. Mild decrease in size of complex intra-abdominal fluid  collections, as described. No new fluid collection.  2. Complex fluid collection adjacent to the drainage catheter,  anteroinferior to pancreatic head; this collection was shown to have  fistulization with the small bowel on  same day performed sinogram.  3. Normal enhancement of the pancreatic parenchyma.    .     Amy Glaser MD   Resident   Medicine   Discharge Summary      Attested   Date of Service:  6/23/2021  2:11 PM   Creation Time:  6/23/2021  7:56 PM            Attestation signed by Noah Gimenez MD at 6/23/2021 9:04 PM   Physician Attestation   I, Noah Gimenez, saw and evaluated this patient prior to discharge.  I discussed the patient with the resident/fellow and agree with plan of care as documented in the note.       I personally reviewed vital signs, medications, labs, and imaging.     I personally spent 35 minutes on discharge activities.     Noah Gimenez MD  Date of Service (when I saw the patient): 06/23/21           []Hide copied text    []Sophia for details  [facility]   Discharge Summary - Medicine & Pediatrics       Date of Admission:  6/13/2021   Date of Discharge:  6/23/2021  2:11  PM  Discharging Provider: Noah Gimenez  Discharge Service: [facility] 5     Discharge Diagnoses   Sepsis (resolved) 2/2 acute necrotizing pancreatitis with infected walled off necrotic collections  S/p RP drain placement (6/15)  Hx gallstone pancreatitis s/p cholecystectomy & ERCP (4/2021)  Severe malnutrition in the context of acute on chronic illness     Follow-ups Needed After Discharge   Follow-up Appointments     Adult [facility] Follow-up and recommended labs and tests      - GI follow up: their clinic will call you to schedule appointment with   Dr. Aguiar with repeat imaging  - IR follow up: number provided in instructions, call when drain out   decreases to <20 ml/day  - ID follow up: referral placed, should follow up prior to the end of   antibiotic course (7/5)     Appointments on [place] and/or [facility] (with [facility] or [facility]   provider or service). Call [phone] if you haven't heard regarding   these appointments within 7 days of discharge.                    Unresulted Labs Ordered in the Past 30 Days of this Admission      Date and Time Order Name Status Description     6/14/2021 1402 Fungus Culture, non-blood Preliminary         These results will be followed up by GI, ID teams in follow up.     Discharge Disposition   Discharged to home  Condition at discharge: Stable     Hospital Course   Alexandra Nunez was admitted for acute necrotizing pancreatitis.  Please see H&P from 6/13/2021 for full details of presentation. The following problems were addressed during hospitalization:     Sepsis (resolved) 2/2 acute necrotizing pancreatitis with infected walled off necrotic collections  S/p RP drain placement (6/15)  Hx gallstone pancreatitis s/p cholecystectomy & ERCP (4/2021)  Initially presented with gallstone pancreatitis in 4/2021, underwent lap tracey on 4/28 and ERCP on 4/29. Recovery c/b development of acute enrrique-pancreatic fluid collections and inability to tolerate PO intake and required NJT  placement 5/18 with tube feeds at home. Admitted with increased abdominal pain and fevers now s/p RP drain placed into necrotic peripancreatic fluid collection. Fluid culture growing strep anginosus, enterobacter cloacae, eikenella corrodens, candida tropicalis. CT abd/pelvis 6/17 shows improvement in fluid collection size.  ID was consulted here to assist with antibiotic planning.  Patient to complete 3-week course of IV ertapenem.  Also received 3 doses of tobramycin while admitted for synergy.  Oral fluconazole added for Candida growing in fluid culture.  Patient to follow-up with ID prior to the end of antibiotic course.  GI to arrange follow-up in their clinic for ongoing pancreatitis management.  Patient will discharge with therapy drain in place, she is to return to IR when the output reduces to less than 20 cc per day to arrange for follow-up visit.     Severe malnutrition in the context of acute on chronic illness  Based on: weight loss, moderate/severe subcutaneous fat loss, moderate/severe muscle loss.  Patient was on tube feeds via NJ tube at the time of admission.  Tube feeds were stopped and NGT was removed on 6/21 that he did not obtain stimulation to see if patient could maintain adequate calorie intake orally.  Able to consume between 3013-7361 danilo over the next 2 days and decision was made to discharge without tube feeding.        Consultations This Hospital Stay   GI PANCREATICOBILIARY ADULT IP CONSULT  PHARMACY TO DOSE VANCO  VASCULAR ACCESS CARE ADULT IP CONSULT  NUTRITION SERVICES ADULT IP CONSULT  PHARMACY IP CONSULT  INTERVENTIONAL RADIOLOGY ADULT/PEDS IP CONSULT  CARE MANAGEMENT / SOCIAL WORK IP CONSULT  VASCULAR ACCESS CARE ADULT IP CONSULT  INFECTIOUS DISEASE GENERAL ADULT IP CONSULT  PHARMACY TO DOSE TOBRAMYCIN     Code Status   Prior        The patient was discussed with Dr. Gimenez.     Amy Glaser MD  Internal Medicine, PGY-2  [address]  [address]  [place] [address]  Phone: [phone]  ______________________________________________________________________     Physical Exam   Vital Signs: BP (!) 123/98 (BP Location: Left arm)   Pulse 104   Temp 98.1  F (36.7  C) (Oral)   Resp 18   Ht 1.829 m (6')   Wt 92.9 kg (204 lb 14.4 oz)   SpO2 98%   BMI 27.79 kg/m    Weight: 204 lbs 14.4 oz  Constitutional: awake, alert, cooperative, no acute distress  Respiratory: breathing comfortably on room air  Cardiovascular: tachycardic, regular  GI: soft, non-distended, NJ in place, RP drain in place with serosanguinous output  Skin: normal skin color, texture, turgor  Musculoskeletal: no lower extremity edema present  Neurologic: alert and oriented x3, CNs II-XII grossly intact, moving all extremities equally      Primary Care Physician   [facility]     Discharge Orders           Home care nursing referral       Home infusion referral       Infectious Disease Referral       Reason for your hospital stay     You were hospitalized with necrotizing pancreatitis.          Adult [facility] Follow-up and recommended labs and tests     - GI follow up: their clinic will call you to schedule appointment with Dr. Aguiar with repeat imaging  - IR follow up: number provided in instructions, call when drain out decreases to <20 ml/day  - ID follow up: referral placed, should follow up prior to the end of antibiotic course (7/5)     Appointments on [place] and/or [facility] (with [facility] or [facility] provider or service). Call [phone] if you haven't heard regarding these appointments within 7 days of discharge.          Activity     Your activity upon discharge: activity as tolerated          Tubes and drains     You are going home with the following tubes or drains: retroperitoneal.  Flush the drain 3 times a day with normal saline.          IV access     **Ordering Provider MUST call/page Care Coordinator/ to discuss arranging this service**     You are going  home with the following vascular access device: PICC.          Discharge Instructions     Continue to maintain eating at least 2000 calories a day. Use plenty of shakes to supplement food to keep this up.          Diet     Follow this diet upon discharge: Orders Placed This Encounter      Snacks/Supplements Adult: Other; strawberry Ensure Enlive shakes; Between Meals      Snacks/Supplements Adult: Ensure Max Protein (bariatric); With Meals      Regular Diet Adult         Significant Results and Procedures   Most Recent 3 CBC's:  Recent Labs   Lab Test 06/23/21  0744 06/22/21  0724 06/21/21  0711   WBC 6.1 7.7 8.7   HGB 8.3* 8.3* 8.5*   MCV 87 85 87   * 566* 581*      Most Recent 3 BMP's:        Recent Labs   Lab Test 06/23/21 0744 06/22/21 0724 06/21/21  0711    131* 134   POTASSIUM 4.7 4.6 4.5   CHLORIDE 99 97 100   CO2 28 29 29   BUN 17 15 14   CR 0.61 0.52 0.49*   ANIONGAP 6 5 6   MARTIN 9.5 9.4 9.4   * 99 100*   ,       Results for orders placed or performed during the hospital encounter of 06/13/21   XR Abdomen Port 1 View     Narrative     EXAMINATION:  XR ABDOMEN PORT 1 VIEWS 6/14/2021 1:38 AM      COMPARISON: Same-day radiograph of the chest     HISTORY: NJ verification     TECHNIQUE: Portable supine radiograph of the abdomen     FINDINGS:  Feeding tube tip projects over the proximal jejunum. Cholecystectomy  surgical clips. No dilated loops of bowel. No pneumatosis.        Impression     IMPRESSION:  Feeding tube tip within the proximal jejunum. Nonobstructive bowel gas  pattern.     I have personally reviewed the examination and initial interpretation  and I agree with the findings.     XAVI STERN MD   XR Chest Port 1 View     Narrative     EXAM: XR CHEST PORT 1 VIEW  6/14/2021 1:37 AM       HISTORY: picc verification     COMPARISON: Same-day radiograph of the abdomen     TECHNIQUE: AP chest radiograph     FINDINGS:   Feeding tube passes inferiorly field-of-view. Left chest  pacemaker  with leads projecting over the heart. Right chest PICC with tip  projecting over the mid right atrium.      Cardiac silhouette is within normal limits. Small left pleural  effusion with overlying, retrocardiac opacities. Silhouetting of the  left hemidiaphragm. Hazy right lung base. No pneumothorax.        Impression     IMPRESSION:  1. PICC tip projects over the mid right atrium.  2. Left lower lobe atelectasis with small effusion.  3. Likely small right pleural effusion with associated atelectasis.     I have personally reviewed the examination and initial interpretation  and I agree with the findings.     XAVI STERN MD   XR Chest Port 1 View     Narrative     EXAM: XR CHEST PORT 1 VW  6/14/2021 12:07 PM       HISTORY: Picc placement .     COMPARISON: Same day radiograph     FINDINGS: RPO radiograph of the chest. Right upper extremity PICC tip  projects over the right atrium. Partially visualized feeding tube  coursing into the duodenum and out of the field-of-view. Left chest  ICD with leads projecting over the right atrium and right ventricle  (RA lead fractured) . Right upper quadrant surgical clips.     Trachea is clear. Cardiac mediastinal silhouette is within normal  limits. Pulmonary vasculature is distinct. No appreciable  pneumothorax. Blunting of the bilateral costophrenic angles. Bibasilar  atelectasis. The upper abdomen is unremarkable.        Impression     IMPRESSION:   Right upper extremity PICC tip projects over the right atrium. Right  atrial pacer lead is fractured, unchanged.        I have personally reviewed the examination and initial interpretation  and I agree with the findings.     TOMASA ADAIR MD   CT Abdomen Peritonium Abscess Drainage     Narrative     Procedures: CT-guided abdominal fluid collection drain placement     Clinical indication: Necrotizing pancreatitis with necrotic fluid  collections     Comparison studies: Outside CT 6/7/2021     PROCEDURE:         Staff  Radiologist: Joseph Salazar MD     Fellow: Mynor Sweeney MD     Dose: 1098 mGycm     Consent: verbal and written informed consent obtained prior to  procedure.     Procedure details: Patient placed in prone position. Right flank/back  prepped and draped in standard sterile fashion. Using CT guidance, a  Freedman needle was advanced into the collection from a right  posterolateral retroperitoneal approach. The blunt stylette was  utilized after passage into the retroperitoneum. This was exchanged  over a Bentson wire for a 14 Ethiopian Lory dilator. This is exchanged  for a 14 Ethiopian Skater locking pigtail drainage catheter. 30 cc of  purulent/necrotic material was aspirated and sent for laboratory  analysis and culture. The tube was secured in place with a 2-0 Ethilon  retention suture. A sterile dressing was applied. The patient was  transferred in stable condition, having tolerated the procedure  without immediate complication.      Medications: Fentanyl 50 mcg IV, midazolam one mg IV, 1% lidocaine  (buffered with 8.4% sodium bicarbonate) for local anesthesia.      Monitoring: The patient was placed on continuous monitoring. Vital  signs and sedation monitored by nursing staff under the supervision of  the attending physician. The patient remained stable throughout the  procedure.     Sedation time: 10 minutes face-to-face     Complications: None.        Impression     IMPRESSION:      CT-guided placement of 14 Ethiopian drain as above.     PLAN:     Flush the drain prior orders. Follow outputs.  If output stops before then, obtain CT abdomen without contrast to  assess for residual fluid collection. If there is a residual fluid  collection, the drain may need to be replaced and/or upsized. If there  is no residual fluid collection, sinogram would be appropriate to  evaluate for drain capping/removal.     Procedure performed by Dr. Sweeney under my supervision.  I, Dr. Joseph Salazar, was present for the  critical portion of the  procedure and was immediately available..     I have personally reviewed the examination and initial interpretation  and I agree with the findings.     NIK ABBOTT MD   XR Chest Port 1 View     Narrative     Exam: XR CHEST PORT 1 VIEW, 6/14/2021 1:37 PM     Indication: RPO view to verify PICC placement after it was pulled back     Comparison: Earlier today     Findings:   Pacemaker again seen on the left chest. The atrial lead is fractured.  Ventricular lead intact. Feeding tube seen coursing through the  mediastinum. Tip projects off the film. PICC tip in the low superior  vena cava, retracted from prior.     Heart is within normal limits. Small bilateral pleural effusions with  associated atelectasis. Pulmonary vasculature within normal limits.        Impression     Impression:   1. PICC tip now in the low superior vena cava.  2. Bilateral pleural effusions with associated atelectasis are stable.  3. Again noted fractured pacemaker lead.     XAVI STERN MD   CT Abdomen Pelvis w Contrast     Narrative     EXAMINATION: CT ABDOMEN PELVIS W CONTRAST  6/17/2021 12:32 PM       CLINICAL HISTORY: Abdominal pain, fever; necrotizing pancreatitis s/p  RP drain, assessing for other fluid collections     COMPARISON: CT: 6/15/2021     PROCEDURE COMMENTS: CT of the abdomen was performed with iopamidol  (ISOVUE-370) solution 131 mL intravenous and oral contrast. Coronal  and sagittal reformatted images were obtained.     FINDINGS:  Support devices: NJ tube with tip in the small bowel distal to the  ligament of Treitz, similar to prior. Additionally, there is a loop of  coil tubing in the stomach. Partial visualization of right PICC with  tip in the right atrium.     Lower thorax:   Bilateral pleural effusions with compressive atelectasis.     Abdomen and pelvis:  Right posterior drain with tip in the heterogenous fluid collection  containing foci of gas anterior to the right kidney in  the  gastrohepatic region measuring approximately 5.2 x 4.1 x 4.0 cm,  slightly decreased when compared to procedural CT dated 6/15/2021 at  which time collection measured 7.3 cm in greatest dimension. There is  an additional fluid collection anterior to the left kidney tracking  inferiorly along the left paracolic gutter measuring approximately 1.9  x 3.4 cm x 13.3 (series 2, image 54, series 4, images 54-64),  decreased in size from CT dated 6/7/2021. Additionally, there are no  longer foci of gas associated with this collection. Normal enhancement  of the pancreas without ductal dilatation.     Multiple hypodensities in the liver, compatible with hepatic cysts,  the largest in segment 6 (series 2, image 22) measuring 1.1 cm in  greatest dimension. Pneumobilia, new from prior. Cholecystectomy with  mild dilatation of the common bile duct measuring up to 1.1 cm, likely  reservoir effect. Normal spleen, kidneys and bladder. Intrauterine  device. No suspicious pelvic mass. The appendix is difficult to  visualize, however, no secondary signs of appendicitis. No evidence of  small or large bowel obstruction. The aorta and its major branches are  unremarkable. Multiple prominent but not enlarged abdominal and pelvic  lymph nodes.     Soft tissues: Injection granuloma seen in the anterior abdominal fat.     Bones: Mild degenerative changes, most pronounced at L5-S1.        Impression     Impression:     1. Multiple infected walled off necrotic collections as described  above, decreased in size from prior exam. Interval placement of drain  in the gastrohepatic collection. No new collections.  2. Pneumobilia consistent with patient's recent sphincterotomy.  3. Mild dilatation of the common bile duct measuring up to 1.1 cm,  likely reservoir effect given history of cholecystectomy.     I have personally reviewed the examination and initial interpretation  and I agree with the findings.     XAVI STERN MD          Discharge Medications         Discharge Medication List as of 6/23/2021  1:33 PM             START taking these medications     Details   ertapenem (INVANZ) 1 GM vial Inject 1 g into the vein every 24 hours for 12 days, No Print Out       fluconazole (DIFLUCAN) 200 MG tablet Take 2 tablets (400 mg) by mouth daily for 11 days, Disp-22 tablet, R-0, E-Prescribe       !! oxyCODONE (ROXICODONE) 5 MG tablet Take 1 tablet (5 mg) by mouth every 4 hours as needed for moderate to severe pain, Disp-6 tablet, R-0, Local Print       !! oxyCODONE (ROXICODONE) 5 MG tablet Take 1 tablet (5 mg) by mouth every 6 hours as needed for pain, Disp-12 tablet, R-0, Local Print       sodium chloride, PF, (SALINE FLUSH) 0.9% PF flush 3 mLs by Intracatheter route every 8 hours, No Print Out        !! - Potential duplicate medications found. Please discuss with provider.             CONTINUE these medications which have NOT CHANGED     Details   albuterol (PROAIR HFA/PROVENTIL HFA/VENTOLIN HFA) 108 (90 Base) MCG/ACT inhaler Inhale 1-2 puffs into the lungs every 4 hours as needed for shortness of breath / dyspnea or wheezing, HistoricalPharmacy may dispense brand covered by insurance (Proair, or proventil or ventolin or generic albuterol inhaler)       albuterol (PROVENTIL) (2.5 MG/3ML) 0.083% neb solution Take 2.5 mg by nebulization every 4 hours as needed for shortness of breath / dyspnea or wheezing, Historical       ALPRAZolam (XANAX) 0.5 MG tablet Take 0.5 mg by mouth 2 times daily as needed for anxiety, Historical       fluticasone (FLOVENT HFA) 110 MCG/ACT inhaler Inhale 2 puffs into the lungs 2 times daily, Historical       ibuprofen (ADVIL/MOTRIN) 100 MG/5ML suspension Take 600 mg by mouth every 6 hours as needed for fever or moderate pain, Historical       levonorgestrel (MIRENA) 20 MCG/24HR IUD 1 each by Intrauterine route onceHistorical       ondansetron (ZOFRAN) 4 MG tablet Take by mouth every 6 hours as needed for nausea,  Historical       Vitamin D3 (CHOLECALCIFEROL) 25 mcg (1000 units) tablet Take 1 tablet by mouth daily, Historical                STOP taking these medications         oxyCODONE (ROXICODONE) 5 MG/5ML solution Comments:   Reason for Stopping:                    Allergies           Allergies   Allergen Reactions     Nkda [No Known Drug Allergies]               Cosigned by: Noah Gimenez MD at 6/23/2021  9:04 PM    Admission (Discharged) on 6/13/2021     Admission (Discharged) on 6/13/2021        Revision & Routing History        Detailed Report      Note shared with patient      Again, thank you for allowing me to participate in the care of your patient.        Sincerely,    Johny Aguiar MD

## 2021-07-19 NOTE — PROGRESS NOTES
Alexandra is a 41 year old who is being evaluated via a billable video visit.      How would you like to obtain your AVS? [employer]  If the video visit is dropped, the invitation should be resent by: Text to cell phone: [phone]  Will anyone else be joining your video visit? No      Video Start Time: 10:25 AM  Video-Visit Details    Type of service:  Video Visit    Video End Time:10:45 AM    Originating Location (pt. Location): Home    Distant Location (provider location):  [facility] [place]     Platform used for Video Visit: Shaneka Morris is very pleasant 40 yo following up after 10 day hospitalization for infected necrotizing pancreatitis. Post cholecystectomy and ERCP at presentation, ref to U when she developed sepsis due to wallled off necrosis. Treated w R retropeitoineal drain. Now in about a month out, had capped her drain as told by IR> However, in last week, has had midepigastric pain, on and off, without fever. Worling on getting energy back    We spent 20 minutes reviewing her sinogram and CT done this week, which are below    IMP: Pain may or may not be related to capping tube, residual small collections.  REC:  1) Open drain to bag, flush w 10cc bid as before, see what comes out and if pain improved  2) Follow-up with scheduled sinogram and CT 7/22  3) Brief follow-up w me next week in virtual clinic        Narrative & Impression   DIAGNOSIS: Necrotizing pancreatitis with necrotic fluid collection     PROCEDURE: Sinogram                                                                   IMPRESSION: Small bowel fistula.       PLAN:   1. Drainage catheter has been capped.  2.  Extra collection bag given to the patient. The patient was  instructed to reattach the collection bag if she develops abdominal  pain fevers or chills.  3.  Repeat CT scan of the abdomen/pelvis and diagnostic sinogram in 7  days at the [facility]. The patient understands she may need a  red  rubber montenegro catheter over a series of visits.   ----------     CLINICAL HISTORY: 40-year-old female with a history of gallstone  pancreatitis status post cholecystectomy on April 28, 2021 and ERCP on  April 29, 2021 for choledocholithiasis at outside hospital was  admitted on June 13, 2021 to June 23, 2021 for acute necrotizing  pancreatitis with infected walled off necrotic collection.  Pt is status post IR drainage and placement of 14 Lithuanian catheter on  Adry 15, 2021 (fluid cultures showing beta hemolytic streptococcus  anginsosus, Enterobacter Cloacae complex, Candida tropicalis, candida  dubliniensis; fluid lipase 69) presents for CT scan and sinogram  today.      The patient reports that there has been no output for over a week. The  patient flushes 10 mL three times a day and has had NET zero output  for at least a week. She denies any fevers or chills, leaking around  the catheter, trauma to the catheter.      She recently saw Infectious Disease on 7/7/2021 and has had IV  antibiotics stopped with her PICC line removed.     CT scan reviewed and demonstrated near complete resolution of fluid  collection around drainage catheter with catheter in good position and  in close proximity to small bowel.  There is also decreased size of  intra-abdominal collections.  See CT today for formal report.       PERFORMED BY: Danica Licea PA-C     FINDINGS:     Abdomen and pelvis: Sequela of necrotizing pancreatitis with multiple  complex walled off collections with placement of right posterior  lateral approach percutaneous drain in the right hemiabdomen anterior  pararenal space, adjacent to the pancreatic head. Decrease in size of  fluid collection adjacent to the distal pigtail catheter, measuring  about 3.2 x 1.2 cm compared to 4.7 x 1.6 cm.. This collection was  shown to fistulization with the small bowel on same-day performed  sinogram. Fluid collection anterior to the left kidney extending along  the anterior  pararenal space (series 3, image 221 and series 4 image  58-60) measures approximately 1.9 x 2.8 x 12 cm, previously 1.9 x 3.7  x 15 cm. Fluid collection superior to the celiac trunk abutting the  caudate lobe of the liver measures 1.5 x 2.7 x 2.7 cm, previously 1.6  x 3.2 x 2.6 cm. No new fluid collection. Prominent mesenteric and  retroperitoneal lymph nodes are decreased since 6/17/2021.     Focal fatty infiltration along the falciform ligament. Multiple  hepatic cysts are unchanged. Cholecystectomy. No abnormal enhancement  of the pancreatic parenchyma. Spleen, adrenal glands, kidneys,  ureters, and urinary bladder are unremarkable. Intrauterine device  adequately positioned. No dilated bowel. Formed stool in the rectum.  Appendix is normal. No free air. Major abdominal pelvic vessels are  normal.     Lung bases: Clear. Partial visualized pacemaker leads.     Bones and soft tissues: No acute or suspicious osseous lesion.                                                                      IMPRESSION:  1. Mild decrease in size of complex intra-abdominal fluid  collections, as described. No new fluid collection.  2. Complex fluid collection adjacent to the drainage catheter,  anteroinferior to pancreatic head; this collection was shown to have  fistulization with the small bowel on  same day performed sinogram.  3. Normal enhancement of the pancreatic parenchyma.    .     Amy Glaser MD   Resident   Medicine   Discharge Summary      Attested   Date of Service:  6/23/2021  2:11 PM   Creation Time:  6/23/2021  7:56 PM            Attestation signed by Noah Gimenez MD at 6/23/2021 9:04 PM   Physician Attestation   I, Noah Gimenez, saw and evaluated this patient prior to discharge.  I discussed the patient with the resident/fellow and agree with plan of care as documented in the note.       I personally reviewed vital signs, medications, labs, and imaging.     I personally spent 35 minutes on discharge  activities.     Noah Gimenez MD  Date of Service (when I saw the patient): 06/23/21           []Hide copied text    []Sophia for details  [facility]   Discharge Summary - Medicine & Pediatrics       Date of Admission:  6/13/2021   Date of Discharge:  6/23/2021  2:11 PM  Discharging Provider: Noah Gimenez  Discharge Service: [place] [id number]     Discharge Diagnoses   Sepsis (resolved) 2/2 acute necrotizing pancreatitis with infected walled off necrotic collections  S/p RP drain placement (6/15)  Hx gallstone pancreatitis s/p cholecystectomy & ERCP (4/2021)  Severe malnutrition in the context of acute on chronic illness     Follow-ups Needed After Discharge   Follow-up Appointments     Adult [facility] Follow-up and recommended labs and tests      - GI follow up: their clinic will call you to schedule appointment with   Dr. Aguiar with repeat imaging  - IR follow up: number provided in instructions, call when drain out   decreases to <20 ml/day  - ID follow up: referral placed, should follow up prior to the end of   antibiotic course (7/5)     Appointments on [place] and/or [facility] (with [facility] or [facility]   provider or service). Call [phone] if you haven't heard regarding   these appointments within 7 days of discharge.                    Unresulted Labs Ordered in the Past 30 Days of this Admission      Date and Time Order Name Status Description     6/14/2021 1402 Fungus Culture, non-blood Preliminary         These results will be followed up by GI, ID teams in follow up.     Discharge Disposition   Discharged to home  Condition at discharge: Stable     Hospital Course   Alexandra Nunez was admitted for acute necrotizing pancreatitis.  Please see H&P from 6/13/2021 for full details of presentation. The following problems were addressed during hospitalization:     Sepsis (resolved) 2/2 acute necrotizing pancreatitis with infected walled off necrotic collections  S/p RP  drain placement (6/15)  Hx gallstone pancreatitis s/p cholecystectomy & ERCP (4/2021)  Initially presented with gallstone pancreatitis in 4/2021, underwent lap tracey on 4/28 and ERCP on 4/29. Recovery c/b development of acute enrrique-pancreatic fluid collections and inability to tolerate PO intake and required NJT placement 5/18 with tube feeds at home. Admitted with increased abdominal pain and fevers now s/p RP drain placed into necrotic peripancreatic fluid collection. Fluid culture growing strep anginosus, enterobacter cloacae, eikenella corrodens, candida tropicalis. CT abd/pelvis 6/17 shows improvement in fluid collection size.  ID was consulted here to assist with antibiotic planning.  Patient to complete 3-week course of IV ertapenem.  Also received 3 doses of tobramycin while admitted for synergy.  Oral fluconazole added for Candida growing in fluid culture.  Patient to follow-up with ID prior to the end of antibiotic course.  GI to arrange follow-up in their clinic for ongoing pancreatitis management.  Patient will discharge with therapy drain in place, she is to return to IR when the output reduces to less than 20 cc per day to arrange for follow-up visit.     Severe malnutrition in the context of acute on chronic illness  Based on: weight loss, moderate/severe subcutaneous fat loss, moderate/severe muscle loss.  Patient was on tube feeds via NJ tube at the time of admission.  Tube feeds were stopped and NGT was removed on 6/21 that he did not obtain stimulation to see if patient could maintain adequate calorie intake orally.  Able to consume between 4867-5250 danilo over the next 2 days and decision was made to discharge without tube feeding.        Consultations This Hospital Stay   GI PANCREATICOBILIARY ADULT IP CONSULT  PHARMACY TO DOSE VANCO  VASCULAR ACCESS CARE ADULT IP CONSULT  NUTRITION SERVICES ADULT IP CONSULT  PHARMACY IP CONSULT  INTERVENTIONAL RADIOLOGY ADULT/PEDS IP CONSULT  CARE MANAGEMENT /  SOCIAL WORK IP CONSULT  VASCULAR ACCESS CARE ADULT IP CONSULT  INFECTIOUS DISEASE GENERAL ADULT IP CONSULT  PHARMACY TO DOSE TOBRAMYCIN     Code Status   Prior        The patient was discussed with Dr. Gimenez.     Amy Glaser MD  Internal Medicine, PGY-2  [address]  [address] [facility] [place] [address]  Phone: [phone]  ______________________________________________________________________     Physical Exam   Vital Signs: BP (!) 123/98 (BP Location: Left arm)   Pulse 104   Temp 98.1  F (36.7  C) (Oral)   Resp 18   Ht 1.829 m (6')   Wt 92.9 kg (204 lb 14.4 oz)   SpO2 98%   BMI 27.79 kg/m    Weight: 204 lbs 14.4 oz  Constitutional: awake, alert, cooperative, no acute distress  Respiratory: breathing comfortably on room air  Cardiovascular: tachycardic, regular  GI: soft, non-distended, NJ in place, RP drain in place with serosanguinous output  Skin: normal skin color, texture, turgor  Musculoskeletal: no lower extremity edema present  Neurologic: alert and oriented x3, CNs II-XII grossly intact, moving all extremities equally      Primary Care Physician   [facility]     Discharge Orders           Home care nursing referral       Home infusion referral       Infectious Disease Referral       Reason for your hospital stay     You were hospitalized with necrotizing pancreatitis.          Adult [facility] Follow-up and recommended labs and tests     - GI follow up: their clinic will call you to schedule appointment with Dr. Aguiar with repeat imaging  - IR follow up: number provided in instructions, call when drain out decreases to <20 ml/day  - ID follow up: referral placed, should follow up prior to the end of antibiotic course (7/5)     Appointments on [place] and/or [facility] (with [facility] or [facility] provider or service). Call [phone] if you haven't heard regarding these appointments within 7 days of discharge.          Activity     Your activity upon discharge:  activity as tolerated          Tubes and drains     You are going home with the following tubes or drains: retroperitoneal.  Flush the drain 3 times a day with normal saline.          IV access     **Ordering Provider MUST call/page Care Coordinator/ to discuss arranging this service**     You are going home with the following vascular access device: PICC.          Discharge Instructions     Continue to maintain eating at least 2000 calories a day. Use plenty of shakes to supplement food to keep this up.          Diet     Follow this diet upon discharge: Orders Placed This Encounter      Snacks/Supplements Adult: Other; strawberry Ensure Enlive shakes; Between Meals      Snacks/Supplements Adult: Ensure Max Protein (bariatric); With Meals      Regular Diet Adult         Significant Results and Procedures   Most Recent 3 CBC's:  Recent Labs   Lab Test 06/23/21  0744 06/22/21  0724 06/21/21  0711   WBC 6.1 7.7 8.7   HGB 8.3* 8.3* 8.5*   MCV 87 85 87   * 566* 581*      Most Recent 3 BMP's:  Recent Labs   Lab Test 06/23/21  0744 06/22/21  0724 06/21/21  0711    131* 134   POTASSIUM 4.7 4.6 4.5   CHLORIDE 99 97 100   CO2 28 29 29   BUN 17 15 14   CR 0.61 0.52 0.49*   ANIONGAP 6 5 6   MARTIN 9.5 9.4 9.4   * 99 100*   ,       Results for orders placed or performed during the hospital encounter of 06/13/21   XR Abdomen Port 1 View     Narrative     EXAMINATION:  XR ABDOMEN PORT 1 VIEWS 6/14/2021 1:38 AM      COMPARISON: Same-day radiograph of the chest     HISTORY: NJ verification     TECHNIQUE: Portable supine radiograph of the abdomen     FINDINGS:  Feeding tube tip projects over the proximal jejunum. Cholecystectomy  surgical clips. No dilated loops of bowel. No pneumatosis.        Impression     IMPRESSION:  Feeding tube tip within the proximal jejunum. Nonobstructive bowel gas  pattern.     I have personally reviewed the examination and initial interpretation  and I agree with the  findings.     XAVI STERN MD   XR Chest Port 1 View     Narrative     EXAM: XR CHEST PORT 1 VIEW  6/14/2021 1:37 AM       HISTORY: picc verification     COMPARISON: Same-day radiograph of the abdomen     TECHNIQUE: AP chest radiograph     FINDINGS:   Feeding tube passes inferiorly field-of-view. Left chest pacemaker  with leads projecting over the heart. Right chest PICC with tip  projecting over the mid right atrium.      Cardiac silhouette is within normal limits. Small left pleural  effusion with overlying, retrocardiac opacities. Silhouetting of the  left hemidiaphragm. Hazy right lung base. No pneumothorax.        Impression     IMPRESSION:  1. PICC tip projects over the mid right atrium.  2. Left lower lobe atelectasis with small effusion.  3. Likely small right pleural effusion with associated atelectasis.     I have personally reviewed the examination and initial interpretation  and I agree with the findings.     XAVI STERN MD   XR Chest Port 1 View     Narrative     EXAM: XR CHEST PORT 1 VW  6/14/2021 12:07 PM       HISTORY: Picc placement .     COMPARISON: Same day radiograph     FINDINGS: RPO radiograph of the chest. Right upper extremity PICC tip  projects over the right atrium. Partially visualized feeding tube  coursing into the duodenum and out of the field-of-view. Left chest  ICD with leads projecting over the right atrium and right ventricle  (RA lead fractured) . Right upper quadrant surgical clips.     Trachea is clear. Cardiac mediastinal silhouette is within normal  limits. Pulmonary vasculature is distinct. No appreciable  pneumothorax. Blunting of the bilateral costophrenic angles. Bibasilar  atelectasis. The upper abdomen is unremarkable.        Impression     IMPRESSION:   Right upper extremity PICC tip projects over the right atrium. Right  atrial pacer lead is fractured, unchanged.        I have personally reviewed the examination and initial interpretation  and I agree with the  findings.     TOMASA ADAIR MD   CT Abdomen Peritonium Abscess Drainage     Narrative     Procedures: CT-guided abdominal fluid collection drain placement     Clinical indication: Necrotizing pancreatitis with necrotic fluid  collections     Comparison studies: Outside CT 6/7/2021     PROCEDURE:         Staff Radiologist: Joseph Salazar MD     Fellow: Mynor Sweeney MD     Dose: 1098 mGycm     Consent: verbal and written informed consent obtained prior to  procedure.     Procedure details: Patient placed in prone position. Right flank/back  prepped and draped in standard sterile fashion. Using CT guidance, a  Freedman needle was advanced into the collection from a right  posterolateral retroperitoneal approach. The blunt stylette was  utilized after passage into the retroperitoneum. This was exchanged  over a [employer] wire for a 14 Djiboutian Lory dilator. This is exchanged  for a 14 Djiboutian Skater locking pigtail drainage catheter. 30 cc of  purulent/necrotic material was aspirated and sent for laboratory  analysis and culture. The tube was secured in place with a 2-0 Ethilon  retention suture. A sterile dressing was applied. The patient was  transferred in stable condition, having tolerated the procedure  without immediate complication.      Medications: Fentanyl 50 mcg IV, midazolam one mg IV, 1% lidocaine  (buffered with 8.4% sodium bicarbonate) for local anesthesia.      Monitoring: The patient was placed on continuous monitoring. Vital  signs and sedation monitored by nursing staff under the supervision of  the attending physician. The patient remained stable throughout the  procedure.     Sedation time: 10 minutes face-to-face     Complications: None.        Impression     IMPRESSION:      CT-guided placement of 14 Djiboutian drain as above.     PLAN:     Flush the drain prior orders. Follow outputs.  If output stops before then, obtain CT abdomen without contrast to  assess for residual fluid collection. If  there is a residual fluid  collection, the drain may need to be replaced and/or upsized. If there  is no residual fluid collection, sinogram would be appropriate to  evaluate for drain capping/removal.     Procedure performed by Dr. Sweeney under my supervision.  I, Dr. Nik Salazar, was present for the critical portion of the  procedure and was immediately available..     I have personally reviewed the examination and initial interpretation  and I agree with the findings.     NIK SALAZAR MD   XR Chest Port 1 View     Narrative     Exam: XR CHEST PORT 1 VIEW, 6/14/2021 1:37 PM     Indication: RPO view to verify PICC placement after it was pulled back     Comparison: Earlier today     Findings:   Pacemaker again seen on the left chest. The atrial lead is fractured.  Ventricular lead intact. Feeding tube seen coursing through the  mediastinum. Tip projects off the film. PICC tip in the low superior  vena cava, retracted from prior.     Heart is within normal limits. Small bilateral pleural effusions with  associated atelectasis. Pulmonary vasculature within normal limits.        Impression     Impression:   1. PICC tip now in the low superior vena cava.  2. Bilateral pleural effusions with associated atelectasis are stable.  3. Again noted fractured pacemaker lead.     XAVI STERN MD   CT Abdomen Pelvis w Contrast     Narrative     EXAMINATION: CT ABDOMEN PELVIS W CONTRAST  6/17/2021 12:32 PM       CLINICAL HISTORY: Abdominal pain, fever; necrotizing pancreatitis s/p  RP drain, assessing for other fluid collections     COMPARISON: CT: 6/15/2021     PROCEDURE COMMENTS: CT of the abdomen was performed with iopamidol  (ISOVUE-370) solution 131 mL intravenous and oral contrast. Coronal  and sagittal reformatted images were obtained.     FINDINGS:  Support devices: NJ tube with tip in the small bowel distal to the  ligament of Treitz, similar to prior. Additionally, there is a loop of  coil tubing in  the stomach. Partial visualization of right PICC with  tip in the right atrium.     Lower thorax:   Bilateral pleural effusions with compressive atelectasis.     Abdomen and pelvis:  Right posterior drain with tip in the heterogenous fluid collection  containing foci of gas anterior to the right kidney in the  gastrohepatic region measuring approximately 5.2 x 4.1 x 4.0 cm,  slightly decreased when compared to procedural CT dated 6/15/2021 at  which time collection measured 7.3 cm in greatest dimension. There is  an additional fluid collection anterior to the left kidney tracking  inferiorly along the left paracolic gutter measuring approximately 1.9  x 3.4 cm x 13.3 (series 2, image 54, series 4, images 54-64),  decreased in size from CT dated 6/7/2021. Additionally, there are no  longer foci of gas associated with this collection. Normal enhancement  of the pancreas without ductal dilatation.     Multiple hypodensities in the liver, compatible with hepatic cysts,  the largest in segment 6 (series 2, image 22) measuring 1.1 cm in  greatest dimension. Pneumobilia, new from prior. Cholecystectomy with  mild dilatation of the common bile duct measuring up to 1.1 cm, likely  reservoir effect. Normal spleen, kidneys and bladder. Intrauterine  device. No suspicious pelvic mass. The appendix is difficult to  visualize, however, no secondary signs of appendicitis. No evidence of  small or large bowel obstruction. The aorta and its major branches are  unremarkable. Multiple prominent but not enlarged abdominal and pelvic  lymph nodes.     Soft tissues: Injection granuloma seen in the anterior abdominal fat.     Bones: Mild degenerative changes, most pronounced at L5-S1.        Impression     Impression:     1. Multiple infected walled off necrotic collections as described  above, decreased in size from prior exam. Interval placement of drain  in the gastrohepatic collection. No new collections.  2. Pneumobilia consistent  with patient's recent sphincterotomy.  3. Mild dilatation of the common bile duct measuring up to 1.1 cm,  likely reservoir effect given history of cholecystectomy.     I have personally reviewed the examination and initial interpretation  and I agree with the findings.     XAVI STERN MD         Discharge Medications         Discharge Medication List as of 6/23/2021  1:33 PM       START taking these medications     Details   ertapenem (INVANZ) 1 GM vial Inject 1 g into the vein every 24 hours for 12 days, No Print Out       fluconazole (DIFLUCAN) 200 MG tablet Take 2 tablets (400 mg) by mouth daily for 11 days, Disp-22 tablet, R-0, E-Prescribe       !! oxyCODONE (ROXICODONE) 5 MG tablet Take 1 tablet (5 mg) by mouth every 4 hours as needed for moderate to severe pain, Disp-6 tablet, R-0, Local Print       !! oxyCODONE (ROXICODONE) 5 MG tablet Take 1 tablet (5 mg) by mouth every 6 hours as needed for pain, Disp-12 tablet, R-0, Local Print       sodium chloride, PF, (SALINE FLUSH) 0.9% PF flush 3 mLs by Intracatheter route every 8 hours, No Print Out        !! - Potential duplicate medications found. Please discuss with provider.             CONTINUE these medications which have NOT CHANGED     Details   albuterol (PROAIR HFA/PROVENTIL HFA/VENTOLIN HFA) 108 (90 Base) MCG/ACT inhaler Inhale 1-2 puffs into the lungs every 4 hours as needed for shortness of breath / dyspnea or wheezing, HistoricalPharmacy may dispense brand covered by insurance (Proair, or proventil or ventolin or generic albuterol inhaler)       albuterol (PROVENTIL) (2.5 MG/3ML) 0.083% neb solution Take 2.5 mg by nebulization every 4 hours as needed for shortness of breath / dyspnea or wheezing, Historical       ALPRAZolam (XANAX) 0.5 MG tablet Take 0.5 mg by mouth 2 times daily as needed for anxiety, Historical       fluticasone (FLOVENT HFA) 110 MCG/ACT inhaler Inhale 2 puffs into the lungs 2 times daily, Historical       ibuprofen (ADVIL/MOTRIN)  100 MG/5ML suspension Take 600 mg by mouth every 6 hours as needed for fever or moderate pain, Historical       levonorgestrel (MIRENA) 20 MCG/24HR IUD 1 each by Intrauterine route onceHistorical       ondansetron (ZOFRAN) 4 MG tablet Take by mouth every 6 hours as needed for nausea, Historical       Vitamin D3 (CHOLECALCIFEROL) 25 mcg (1000 units) tablet Take 1 tablet by mouth daily, Historical                STOP taking these medications         oxyCODONE (ROXICODONE) 5 MG/5ML solution Comments:   Reason for Stopping:                    Allergies           Allergies   Allergen Reactions     Nkda [No Known Drug Allergies]               Cosigned by: Noah Gimenez MD at 6/23/2021  9:04 PM    Admission (Discharged) on 6/13/2021     Admission (Discharged) on 6/13/2021        Revision & Routing History        Detailed Report      Note shared with patient

## 2021-07-19 NOTE — PATIENT INSTRUCTIONS
Follow up:    Dr. Aguiar has outlined the following steps after your recent clinic visit:    1) Open drain to bag, flush w 10cc bid as before, see what comes out and if pain improved  2) Follow-up with scheduled sinogram and CT 7/22  3) Brief follow-up with Dr. Aguiar next week.     You are scheduled for a virtual follow up appointment on 7/26/21 at 10:30 AM. Please let us know if that will not work for you and we can reschedule.       Please call with any questions or concerns regarding your clinic visit today.    It is a pleasure being involved in your health care.    Contacts post-consultation depending on your need:    Schedule Clinic Appointments            [phone] # 1   M-F 7:30 - 5 pm    Jessica Damon RN Care Coordinator  [phone]    eGoff Cook LPN    [phone]     OR Procedure Scheduling                             [phone]    My Chart is available 24 hours a day and is a secure way to access your records and communicate with your care team.  I strongly recommend signing up if you haven't already done so, if you are comfortable with computers.  If you would like to inquire about this or are having problems with My Chart access, you may call [phone] or go online at [email or web address].  Please allow at least 24 hours for a response and extra time on weekends and Holidays.

## 2021-07-22 ENCOUNTER — HOSPITAL ENCOUNTER (OUTPATIENT)
Facility: CLINIC | Age: 41
Discharge: HOME | End: 2021-07-22
Attending: INTERNAL MEDICINE | Admitting: INTERNAL MEDICINE
Payer: COMMERCIAL

## 2021-07-22 ENCOUNTER — HOSPITAL ENCOUNTER (OUTPATIENT)
Dept: CT IMAGING | Facility: CLINIC | Age: 41
End: 2021-07-22
Attending: PHYSICIAN ASSISTANT
Payer: COMMERCIAL

## 2021-07-22 ENCOUNTER — APPOINTMENT (OUTPATIENT)
Dept: INTERVENTIONAL RADIOLOGY/VASCULAR | Facility: CLINIC | Age: 41
End: 2021-07-22
Attending: PHYSICIAN ASSISTANT
Payer: COMMERCIAL

## 2021-07-22 ENCOUNTER — APPOINTMENT (OUTPATIENT)
Dept: MEDSURG UNIT | Facility: CLINIC | Age: 41
End: 2021-07-22
Attending: PHYSICIAN ASSISTANT
Payer: COMMERCIAL

## 2021-07-22 VITALS
RESPIRATION RATE: 16 BRPM | BODY MASS INDEX: 27.09 KG/M2 | SYSTOLIC BLOOD PRESSURE: 129 MMHG | TEMPERATURE: 98.2 F | DIASTOLIC BLOOD PRESSURE: 89 MMHG | WEIGHT: 200 LBS | HEIGHT: 72 IN | OXYGEN SATURATION: 99 % | HEART RATE: 75 BPM

## 2021-07-22 DIAGNOSIS — K85.12 ACUTE BILIARY PANCREATITIS WITH INFECTED NECROSIS: ICD-10-CM

## 2021-07-22 LAB
B-HCG SERPL-ACNC: <1 IU/L (ref 0–5)
INR PPP: 1.04 (ref 0.85–1.15)

## 2021-07-22 PROCEDURE — 255N000002 HC RX 255 OP 636: Performed by: PHYSICIAN ASSISTANT

## 2021-07-22 PROCEDURE — 75984 XRAY CONTROL CATHETER CHANGE: CPT | Mod: 26 | Performed by: PHYSICIAN ASSISTANT

## 2021-07-22 PROCEDURE — 74177 CT ABD & PELVIS W/CONTRAST: CPT | Mod: 26 | Performed by: RADIOLOGY

## 2021-07-22 PROCEDURE — 85610 PROTHROMBIN TIME: CPT | Performed by: NURSE PRACTITIONER

## 2021-07-22 PROCEDURE — 999N000132 HC STATISTIC PP CARE STAGE 1

## 2021-07-22 PROCEDURE — 49423 EXCHANGE DRAINAGE CATHETER: CPT

## 2021-07-22 PROCEDURE — 74177 CT ABD & PELVIS W/CONTRAST: CPT

## 2021-07-22 PROCEDURE — C1769 GUIDE WIRE: HCPCS

## 2021-07-22 PROCEDURE — 36415 COLL VENOUS BLD VENIPUNCTURE: CPT | Performed by: NURSE PRACTITIONER

## 2021-07-22 PROCEDURE — 84702 CHORIONIC GONADOTROPIN TEST: CPT | Performed by: NURSE PRACTITIONER

## 2021-07-22 PROCEDURE — C1729 CATH, DRAINAGE: HCPCS

## 2021-07-22 PROCEDURE — C1887 CATHETER, GUIDING: HCPCS

## 2021-07-22 PROCEDURE — 49424 ASSESS CYST CONTRAST INJECT: CPT | Performed by: PHYSICIAN ASSISTANT

## 2021-07-22 PROCEDURE — 250N000011 HC RX IP 250 OP 636

## 2021-07-22 PROCEDURE — 49423 EXCHANGE DRAINAGE CATHETER: CPT | Performed by: PHYSICIAN ASSISTANT

## 2021-07-22 PROCEDURE — 250N000009 HC RX 250: Performed by: PHYSICIAN ASSISTANT

## 2021-07-22 PROCEDURE — 76080 X-RAY EXAM OF FISTULA: CPT | Mod: 26 | Performed by: PHYSICIAN ASSISTANT

## 2021-07-22 RX ORDER — IOPAMIDOL 755 MG/ML
123 INJECTION, SOLUTION INTRAVASCULAR ONCE
Status: COMPLETED | OUTPATIENT
Start: 2021-07-22 | End: 2021-07-22

## 2021-07-22 RX ORDER — NALOXONE HYDROCHLORIDE 0.4 MG/ML
0.2 INJECTION, SOLUTION INTRAMUSCULAR; INTRAVENOUS; SUBCUTANEOUS
Status: DISCONTINUED | OUTPATIENT
Start: 2021-07-22 | End: 2021-07-22 | Stop reason: HOSPADM

## 2021-07-22 RX ORDER — LIDOCAINE 40 MG/G
CREAM TOPICAL
Status: DISCONTINUED | OUTPATIENT
Start: 2021-07-22 | End: 2021-07-22 | Stop reason: HOSPADM

## 2021-07-22 RX ORDER — NALOXONE HYDROCHLORIDE 0.4 MG/ML
0.4 INJECTION, SOLUTION INTRAMUSCULAR; INTRAVENOUS; SUBCUTANEOUS
Status: DISCONTINUED | OUTPATIENT
Start: 2021-07-22 | End: 2021-07-22 | Stop reason: HOSPADM

## 2021-07-22 RX ORDER — IOPAMIDOL 510 MG/ML
150 INJECTION, SOLUTION INTRAVASCULAR ONCE
Status: COMPLETED | OUTPATIENT
Start: 2021-07-22 | End: 2021-07-22

## 2021-07-22 RX ORDER — FENTANYL CITRATE 50 UG/ML
25-50 INJECTION, SOLUTION INTRAMUSCULAR; INTRAVENOUS EVERY 5 MIN PRN
Status: DISCONTINUED | OUTPATIENT
Start: 2021-07-22 | End: 2021-07-22 | Stop reason: HOSPADM

## 2021-07-22 RX ORDER — SODIUM CHLORIDE 9 MG/ML
INJECTION, SOLUTION INTRAVENOUS CONTINUOUS
Status: DISCONTINUED | OUTPATIENT
Start: 2021-07-22 | End: 2021-07-22 | Stop reason: HOSPADM

## 2021-07-22 RX ORDER — FLUMAZENIL 0.1 MG/ML
0.2 INJECTION, SOLUTION INTRAVENOUS
Status: DISCONTINUED | OUTPATIENT
Start: 2021-07-22 | End: 2021-07-22 | Stop reason: HOSPADM

## 2021-07-22 RX ADMIN — LIDOCAINE HYDROCHLORIDE 3 ML: 10 INJECTION, SOLUTION EPIDURAL; INFILTRATION; INTRACAUDAL; PERINEURAL at 13:57

## 2021-07-22 RX ADMIN — IOPAMIDOL 150 ML: 510 INJECTION, SOLUTION INTRAVASCULAR at 13:59

## 2021-07-22 RX ADMIN — IOPAMIDOL 123 ML: 755 INJECTION, SOLUTION INTRAVENOUS at 10:54

## 2021-07-22 ASSESSMENT — MIFFLIN-ST. JEOR: SCORE: 1674.32

## 2021-07-22 NOTE — PROCEDURES
[facility]    Procedure: IR Procedure Note    Date/Time: 7/22/2021 1:58 PM  Performed by: Adrianna Abel PA-C  Authorized by: Adrianna Abel PA-C     UNIVERSAL PROTOCOL   Site Marked: NA  Prior Images Obtained and Reviewed:  Yes  Required items: Required blood products, implants, devices and special equipment available    Patient identity confirmed:  Verbally with patient, arm band, provided demographic data and hospital-assigned identification number  NA - No sedation, light sedation, or local anesthesia  Confirmation Checklist:  Patient's identity using two indicators, relevant allergies, procedure was appropriate and matched the consent or emergent situation and correct equipment/implants were available  Time out: Immediately prior to the procedure a time out was called    Universal Protocol: the Joint Commission Universal Protocol was followed    Preparation: Patient was prepped and draped in usual sterile fashion           ANESTHESIA    Anesthesia: Local infiltration  Local Anesthetic:  Lidocaine 1% without epinephrine      SEDATION    Patient Sedated: No    See dictated procedure note for full details.  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 Exchanged 14 Fr pigtail drain for 14 Fr red rubber [place] drain. Return in 1.5-2 weeks. Do not flush drain.  Length of time physician/provider present for 1:1 monitoring during sedation: 0

## 2021-07-22 NOTE — DISCHARGE INSTRUCTIONS
"[facility]  Discharge Instructions for   Drainage  Tube Placement    After you go home:    Drink plenty of fluids.    Resume a regular diet unless otherwise ordered by your physician.      For 24 Hours:    Relax and take it easy.    Do not do any strenuous exercise or lifting greater that 10 lbs for at least 2 days following your procedure.    CALL THE PHYSICIAN IF:    You start bleeding from the procedure site. If you do start to bleed from the site lie down and hold some pressure on the site. Your physician will tell you if you need to return to the hospital.    You develop nausea or vomiting.    You develop hives or a rash or any unexplained itching.    ADDITIONAL INSTRUCTIONS:  Please call for the following problems:  1. The skin around the tube is red, painful, or has drainage.  2. You have pain in your back, over your kidney.  3. You have a fever of 100.5 F and chills  4. You feel nauseated and \"just not right.\"    Change the dressing initially the next day to check the insertion site.  After that change every other day.  Clean around tube site with washcloth and antibacterial soap.      [facility] INTERVENTIONAL RADIOLOGY DEPARTMENT  Procedure Physician: Adrianna Abel Date:July 22, 2021  Telephone Numbers:  [phone]     Monday-Friday 8:00AM-4:30PM                       [phone]     After 4:30 PM Monday-Friday, Weekends and Holidays. Ask for Interventional Radiologist on Call. Someone is available 24 hours a day.  [facility] toll free number: [phone] Monday- Friday 8:00AM -4:30PM.      I  "

## 2021-07-22 NOTE — IR NOTE
Patient Name: Alexandra Nunez  Medical Record Number: [id number]  Today's Date: 7/22/2021    Procedure: Sinogram and drain exchange  Proceduralist: Adrianna Abel PA-C (On-Call Pager #[phone])    Patient in room: 1300  Procedure Start: 1329  Procedure end: 1325  Sedation medications administered: n/a    Report given to: Joanna MARCH on 2A at 1402  : n/a    Other Notes: Pt arrived to IR room 2 from . Consent reviewed. Pt denies any questions or concerns regarding procedure. Pt positioned prone and monitored per protocol. Pt tolerated procedure without any noted complications. Pt transferred back to 2A.

## 2021-07-22 NOTE — PROGRESS NOTES
discharge  discharge criteria met. Drain instructions reviewed with pt. Supplies sent. Singogram appointment made. Pt verbalized understanding and signed papers. PIV removed. Push to front door in wheelchair.

## 2021-07-22 NOTE — PROGRESS NOTES
S/p drain change. Right flank site intact. Pt alert and oriented. Denies pain. VSS .Plan to return in 1.5/2weeks for change.

## 2021-07-22 NOTE — PROGRESS NOTES
Pt prepped for sinogram of biliary drain.PIV placed in CT and labs were sent and NS started infusing.Consent signed and questions answered.Pt's son is going to drive pt home and take care of her.

## 2021-07-26 ENCOUNTER — VIRTUAL VISIT (OUTPATIENT)
Dept: GASTROENTEROLOGY | Facility: CLINIC | Age: 41
End: 2021-07-26
Payer: COMMERCIAL

## 2021-07-26 VITALS — WEIGHT: 200 LBS | HEIGHT: 72 IN | BODY MASS INDEX: 27.09 KG/M2

## 2021-07-26 DIAGNOSIS — K85.12 ACUTE BILIARY PANCREATITIS WITH INFECTED NECROSIS: Primary | ICD-10-CM

## 2021-07-26 PROCEDURE — 99212 OFFICE O/P EST SF 10 MIN: CPT | Mod: 95 | Performed by: INTERNAL MEDICINE

## 2021-07-26 ASSESSMENT — MIFFLIN-ST. JEOR: SCORE: 1674.19

## 2021-07-26 ASSESSMENT — PAIN SCALES - GENERAL: PAINLEVEL: MODERATE PAIN (5)

## 2021-07-26 NOTE — LETTER
7/26/2021         RE: Alexandra Nunez  [address] [place] [address]        Dear Colleague,    Thank you for referring your patient, Alexandra Nunez, to the [facility] [place]. Please see a copy of my visit note below.    Follow-up pt w infected necrotizing pancreatitis. R RP drain, as below. Since reopening drain to gravity, and having 14F drain changed for Red Rubber as below, pt doing well. No drainage to speak of.     IMP: Although singogram showed small bowel fistula, small cavity, suspect that catheter can be backed out and small cavity and fistula will internalize. Clinically doing very well but catheter is bothersome, and keeping pt from work    REC: at follow-up w IR, ask practitioner to see our note, and if they agree, start backing drain out.   Follow-up w us in 6-8 weeks, sooner if has any clinical problems.       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 Exchanged 14 Fr pigtail drain for 14 Fr red rubber [place] drain. Return in 1.5-2 weeks. Do not flush drain.    EXAMINATION: CT ABDOMEN PELVIS W CONTRAST, 7/22/2021 11:11 AM     INDICATION: Abdominal abscess/infection suspected; Acute biliary  pancreatitis with infected necrosis. Additional history from the  chart: Recent 10 day hospitalization for infected necrotizing  pancreatitis. CT-guided placement of a right retroperitoneal drainage  catheter by interventional radiology 6/15/2021. Drainage catheter  capped 7/12/2021 with a repeat CT in 7 days.     COMPARISON STUDY: CT AP 7/12/2021     TECHNIQUE: CT scan of the abdomen and pelvis was performed on  multidetector CT scanner using volumetric acquisition technique and  images were reconstructed in multiple planes with variable thickness  and reviewed on dedicated workstations.      CONTRAST: iopamidol (ISOVUE-370) solution 123 mL injected IV  without  oral contrast     CT scan radiation dose is optimized to minimum requisite dose using  automated dose modulation techniques.     FINDINGS:  Pancreas: Again seen right posterior lateral approach retroperitoneal  pigtail catheter drain with distal pigtail in the right pararenal  space adjacent to the pancreatic head. The associated decompressed  collection measures today 2.2 x 2 cm, grossly stable from prior. The  adjacent fluid collection just superior to the pigtail catheter  (series 5 image 170) appears slightly increased since 7/12/2021,  measuring 4.2 x 4.6 x 1.7 cm previously 3.2 by 4.2 x 1.7 cm when  measured in similar fashion.  However there is only minimal residual  hypodense fluid component. Small amount of healing are demonstrated  within this collection with small bowel fistula noted on recent  sinogram study 7/12/2021.     In the left anterior pararenal space the collection today measures 2.3  x 1.6 cm transverse by AP and extends along the entire length of the  left anterior pararenal space. Previously this measured 2 x 1.7 cm in  cross section. Additional fluid collection measuring 3.1 x 3.1 x 1.9  cm superior to the celiac axis previously measured 4 x 3 x 2 cm though  with decreased fluid component. No new fluid collections identified.     Mild mesenteric fat stranding and trace fluid in the paracolic gutters  and adjacent to the walled off collections, grossly stable from  previous exam. No pneumatosis, pneumoperitoneum, or portal venous gas.     Lower thorax: Partially visualized cardiac device leads, otherwise  unremarkable.     Liver: Multiple scattered simple hepatic cysts. Again seen hypodense  area of focal fatty sparing about the falciform ligament. No  suspicious masses. Mild intrahepatic biliary dilation, presumably  secondary to reservoir effect.     Biliary System: Gallbladder surgically absent with dilated  extrahepatic bile ducts, likely secondary to reservoir effect.       Adrenal glands: No mass or nodules     Spleen: Splenomegaly measuring up to 14.4 cm in craniocaudal  dimension.     Kidneys: No suspicious mass, obstructing calculus or hydronephrosis.     Gastrointestinal tract :Normal appendix. Normal caliber small bowel.   No dilated loops of small bowel. Colonic diverticulosis without  diverticulitis. Mild wall thickening and edema about the splenic  flexure and descending colon, presumably reactive.     Lymph nodes: Multiple prominent mesenteric and retroperitoneal lymph  nodes while not enlarged are presumably reactive.     Vasculature: Patent major abdominal vasculature.  Portal veins are  patent.     Pelvis: Urinary bladder is normal.  Trace free fluid in the pelvis.  Intrauterine device in appropriate positioning.     Osseous structures: No aggressive or acute osseous lesion.      Soft tissues: Nonspecific focal subcutaneous nodularity likely  represents sequelae of medication injections.                                                                      IMPRESSION:   1. Stable position of the right posterior approach retroperitoneal  drainage catheter.  Unchanged decompressed fluid collection at the  pigtail with mildly increased adjacent fluid collection with residual  fluid and air locules noted. 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  2. Additional complex fluid collections are mildly decreased since  7/12/2021.  3. Continued mild reactive lymph nodes, mesenteric fat stranding and  trace free fluid in the abdomen and pelvis.     I have personally reviewed the examination and initial interpretation  and I agree with the findings.     MART POOLE MD        Again, thank you for allowing me to participate in the care of your patient.        Sincerely,    Johny Aguiar MD

## 2021-07-26 NOTE — NURSING NOTE
Chief Complaint   Patient presents with     RECHECK     Follow up, per Dr. Aguiar       Vitals:    07/26/21 1018   Weight: 90.7 kg (200 lb)   Height: 1.829 m (6')       Body mass index is 27.12 kg/m .          JUSTIN MORE EMT

## 2021-07-26 NOTE — PATIENT INSTRUCTIONS
Follow up:    Dr. Aguiar has outlined the following steps after your recent clinic visit:    -At your follow-up with IR, ask practitioner to see our note, and if they agree, start backing drain out.     -Follow-up with Dr. Aguiar in 6-8 weeks, sooner if has any clinical problems.      Please call with any questions or concerns regarding your clinic visit today.    It is a pleasure being involved in your health care.    Contacts post-consultation depending on your need:    Schedule Clinic Appointments            [phone] # 1   M-F 7:30 - 5 pm    Jessica Damon RN Care Coordinator  [phone]    Geoff Cook LPN    [phone]     OR Procedure Scheduling                             [phone]    My Chart is available 24 hours a day and is a secure way to access your records and communicate with your care team.  I strongly recommend signing up if you haven't already done so, if you are comfortable with computers.  If you would like to inquire about this or are having problems with My Chart access, you may call [phone] or go online at [email or web address].  Please allow at least 24 hours for a response and extra time on weekends and Holidays.

## 2021-07-26 NOTE — PROGRESS NOTES
"Alexandra Nunez is a 41 year old female who is being evaluated via a billable video visit.      The patient has been notified of following:     \"This video visit will be conducted via a call between you and your physician/provider. We have found that certain health care needs can be provided without the need for an in-person physical exam.  This service lets us provide the care you need with a video conversation.  If a prescription is necessary we can send it directly to your pharmacy.  If lab work is needed we can place an order for that and you can then stop by our lab to have the test done at a later time.    If during the course of the call the physician/provider feels a video visit is not appropriate, you will not be charged for this service.\"     Patient confirmed that they are in [place] for today's visit Yes    Video-Visit Details  Type of service:  Video Visit    Start: 07/26/2021 10:40 am  Stop:  10:47 am     Originating Location (pt. Location): [place]    Distant Location (provider location):  [facility] [place]     Platform used: [facility]     Follow-up pt w infected necrotizing pancreatitis. R RP drain, as below. Since reopening drain to gravity, and having 14F drain changed for Red Rubber as below, pt doing well. No drainage to speak of.     IMP: Although singogram showed small bowel fistula, small cavity, suspect that catheter can be backed out and small cavity and fistula will internalize. Clinically doing very well but catheter is bothersome, and keeping pt from work    REC: at follow-up w IR, ask practitioner to see our note, and if they agree, start backing drain out.   Follow-up w us in 6-8 weeks, sooner if has any clinical problems.       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 Exchanged 14 Fr pigtail drain for 14 Fr red rubber [place] drain. Return in " 1.5-2 weeks. Do not flush drain.    EXAMINATION: CT ABDOMEN PELVIS W CONTRAST, 7/22/2021 11:11 AM     INDICATION: Abdominal abscess/infection suspected; Acute biliary  pancreatitis with infected necrosis. Additional history from the  chart: Recent 10 day hospitalization for infected necrotizing  pancreatitis. CT-guided placement of a right retroperitoneal drainage  catheter by interventional radiology 6/15/2021. Drainage catheter  capped 7/12/2021 with a repeat CT in 7 days.     COMPARISON STUDY: CT AP 7/12/2021     TECHNIQUE: CT scan of the abdomen and pelvis was performed on  multidetector CT scanner using volumetric acquisition technique and  images were reconstructed in multiple planes with variable thickness  and reviewed on dedicated workstations.      CONTRAST: iopamidol (ISOVUE-370) solution 123 mL injected IV without  oral contrast     CT scan radiation dose is optimized to minimum requisite dose using  automated dose modulation techniques.     FINDINGS:  Pancreas: Again seen right posterior lateral approach retroperitoneal  pigtail catheter drain with distal pigtail in the right pararenal  space adjacent to the pancreatic head. The associated decompressed  collection measures today 2.2 x 2 cm, grossly stable from prior. The  adjacent fluid collection just superior to the pigtail catheter  (series 5 image 170) appears slightly increased since 7/12/2021,  measuring 4.2 x 4.6 x 1.7 cm previously 3.2 by 4.2 x 1.7 cm when  measured in similar fashion.  However there is only minimal residual  hypodense fluid component. Small amount of healing are demonstrated  within this collection with small bowel fistula noted on recent  sinogram study 7/12/2021.     In the left anterior pararenal space the collection today measures 2.3  x 1.6 cm transverse by AP and extends along the entire length of the  left anterior pararenal space. Previously this measured 2 x 1.7 cm in  cross section. Additional fluid collection  measuring 3.1 x 3.1 x 1.9  cm superior to the celiac axis previously measured 4 x 3 x 2 cm though  with decreased fluid component. No new fluid collections identified.     Mild mesenteric fat stranding and trace fluid in the paracolic gutters  and adjacent to the walled off collections, grossly stable from  previous exam. No pneumatosis, pneumoperitoneum, or portal venous gas.     Lower thorax: Partially visualized cardiac device leads, otherwise  unremarkable.     Liver: Multiple scattered simple hepatic cysts. Again seen hypodense  area of focal fatty sparing about the falciform ligament. No  suspicious masses. Mild intrahepatic biliary dilation, presumably  secondary to reservoir effect.     Biliary System: Gallbladder surgically absent with dilated  extrahepatic bile ducts, likely secondary to reservoir effect.      Adrenal glands: No mass or nodules     Spleen: Splenomegaly measuring up to 14.4 cm in craniocaudal  dimension.     Kidneys: No suspicious mass, obstructing calculus or hydronephrosis.     Gastrointestinal tract :Normal appendix. Normal caliber small bowel.   No dilated loops of small bowel. Colonic diverticulosis without  diverticulitis. Mild wall thickening and edema about the splenic  flexure and descending colon, presumably reactive.     Lymph nodes: Multiple prominent mesenteric and retroperitoneal lymph  nodes while not enlarged are presumably reactive.     Vasculature: Patent major abdominal vasculature.  Portal veins are  patent.     Pelvis: Urinary bladder is normal.  Trace free fluid in the pelvis.  Intrauterine device in appropriate positioning.     Osseous structures: No aggressive or acute osseous lesion.      Soft tissues: Nonspecific focal subcutaneous nodularity likely  represents sequelae of medication injections.                                                                      IMPRESSION:   1. Stable position of the right posterior approach retroperitoneal  drainage catheter.   Unchanged decompressed fluid collection at the  pigtail with mildly increased adjacent fluid collection with residual  fluid and air locules noted. 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  2. Additional complex fluid collections are mildly decreased since  7/12/2021.  3. Continued mild reactive lymph nodes, mesenteric fat stranding and  trace free fluid in the abdomen and pelvis.     I have personally reviewed the examination and initial interpretation  and I agree with the findings.     MART POOLE MD

## 2021-07-28 DIAGNOSIS — Z11.59 ENCOUNTER FOR SCREENING FOR OTHER VIRAL DISEASES: ICD-10-CM

## 2021-08-05 ENCOUNTER — APPOINTMENT (OUTPATIENT)
Dept: INTERVENTIONAL RADIOLOGY/VASCULAR | Facility: CLINIC | Age: 41
End: 2021-08-05
Attending: PHYSICIAN ASSISTANT
Payer: COMMERCIAL

## 2021-08-05 ENCOUNTER — HOSPITAL ENCOUNTER (OUTPATIENT)
Facility: CLINIC | Age: 41
Discharge: HOME | End: 2021-08-05
Attending: RADIOLOGY | Admitting: PHYSICIAN ASSISTANT
Payer: COMMERCIAL

## 2021-08-05 VITALS
SYSTOLIC BLOOD PRESSURE: 124 MMHG | RESPIRATION RATE: 16 BRPM | OXYGEN SATURATION: 100 % | DIASTOLIC BLOOD PRESSURE: 96 MMHG | HEART RATE: 86 BPM

## 2021-08-05 DIAGNOSIS — R18.8 ABDOMINAL FLUID COLLECTION: Primary | ICD-10-CM

## 2021-08-05 PROCEDURE — 250N000009 HC RX 250: Performed by: PHYSICIAN ASSISTANT

## 2021-08-05 PROCEDURE — 76080 X-RAY EXAM OF FISTULA: CPT | Mod: 26 | Performed by: PHYSICIAN ASSISTANT

## 2021-08-05 PROCEDURE — 255N000002 HC RX 255 OP 636: Performed by: RADIOLOGY

## 2021-08-05 PROCEDURE — 49424 ASSESS CYST CONTRAST INJECT: CPT | Performed by: PHYSICIAN ASSISTANT

## 2021-08-05 PROCEDURE — 76080 X-RAY EXAM OF FISTULA: CPT

## 2021-08-05 PROCEDURE — 20501 NJX SINUS TRACT DIAGNOSTIC: CPT

## 2021-08-05 RX ORDER — LIDOCAINE HYDROCHLORIDE 10 MG/ML
1-30 INJECTION, SOLUTION EPIDURAL; INFILTRATION; INTRACAUDAL; PERINEURAL
Status: COMPLETED | OUTPATIENT
Start: 2021-08-05 | End: 2021-08-05

## 2021-08-05 RX ORDER — IODIXANOL 320 MG/ML
50 INJECTION, SOLUTION INTRAVASCULAR ONCE
Status: COMPLETED | OUTPATIENT
Start: 2021-08-05 | End: 2021-08-05

## 2021-08-05 RX ADMIN — LIDOCAINE HYDROCHLORIDE 1 ML: 10 INJECTION, SOLUTION EPIDURAL; INFILTRATION; INTRACAUDAL; PERINEURAL at 11:57

## 2021-08-05 RX ADMIN — IODIXANOL 10 ML: 320 INJECTION, SOLUTION INTRAVASCULAR at 11:57

## 2021-08-05 NOTE — PROCEDURES
[facility]    Procedure: IR Procedure Note    Date/Time: 8/5/2021 12:18 PM  Performed by: Som Stewart PA-C  Authorized by: Som Stewart PA-C   IR Fellow Physician:  Other(s) attending procedure: Assist: ANGEL Gordon    UNIVERSAL PROTOCOL   Site Marked: NA  Prior Images Obtained and Reviewed:  Yes  Required items: Required blood products, implants, devices and special equipment available    Patient identity confirmed:  Verbally with patient, arm band, provided demographic data and hospital-assigned identification number  Patient was reevaluated immediately before administering moderate or deep sedation or anesthesia  Confirmation Checklist:  Patient's identity using two indicators, relevant allergies, procedure was appropriate and matched the consent or emergent situation and correct equipment/implants were available  Time out: Immediately prior to the procedure a time out was called    Universal Protocol: the Joint Commission Universal Protocol was followed    Preparation: Patient was prepped and draped in usual sterile fashion    ESBL (mL):  0.2         ANESTHESIA    Anesthesia: Local infiltration  Local Anesthetic:  Lidocaine 1% without epinephrine  Anesthetic Total (mL):  1      SEDATION    Patient Sedated: No    See dictated procedure note for full details.  Findings: Sinogram demonstrates existing right flank 14 Fr. RRR well positioned in small bowel, with persistent complex fistula. Fluoroscopy guided pullback performed, approximately 3 cm, with new position abutting small bowel fistula.    Specimens: none    Complications: None    Condition: Stable    Plan: Return in 1 week for repeat evaluation, and likely continued pullback.    PROCEDURE   Patient Tolerance:  Patient tolerated the procedure well with no immediate complications    Length of time physician/provider present for 1:1 monitoring during sedation: 0

## 2021-08-05 NOTE — IR NOTE
Patient Name: Alexandra Nunez  Medical Record Number: [id number]  Today's Date: 8/5/2021    Procedure: Sinogram and Drain Pullback   Proceduralist: Som Stewart (On-Call Pager #[phone])    Patient in room: 1110  Procedure Start: 1144  Procedure end: 1208  Sedation medications administered: none     Report given to: n/a  : n/a    Other Notes: Pt arrived to IR room 2 from [facility]. Consent reviewed. Pt denies any questions or concerns regarding procedure. Pt positioned prone and monitored per protocol. Pt tolerated procedure without any noted complications. Pt transferred back to gold waiting.

## 2021-08-09 ENCOUNTER — TELEPHONE (OUTPATIENT)
Dept: GENERAL RADIOLOGY | Facility: CLINIC | Age: 41
End: 2021-08-09

## 2021-08-09 NOTE — TELEPHONE ENCOUNTER
Message left for patient to call and confirm covid test has been set up in outside system prior to appt on 8/12/21

## 2021-08-12 ENCOUNTER — ANCILLARY PROCEDURE (OUTPATIENT)
Dept: GENERAL RADIOLOGY | Facility: CLINIC | Age: 41
End: 2021-08-12
Attending: PHYSICIAN ASSISTANT
Payer: COMMERCIAL

## 2021-08-12 DIAGNOSIS — K63.2 ENTEROCUTANEOUS FISTULA: Primary | ICD-10-CM

## 2021-08-12 DIAGNOSIS — R18.8 ABDOMINAL FLUID COLLECTION: ICD-10-CM

## 2021-08-12 PROCEDURE — 76080 X-RAY EXAM OF FISTULA: CPT | Performed by: PHYSICIAN ASSISTANT

## 2021-08-12 PROCEDURE — 49424 ASSESS CYST CONTRAST INJECT: CPT | Performed by: PHYSICIAN ASSISTANT

## 2021-08-12 RX ORDER — LIDOCAINE HYDROCHLORIDE 10 MG/ML
5 INJECTION, SOLUTION EPIDURAL; INFILTRATION; INTRACAUDAL; PERINEURAL ONCE
Status: COMPLETED | OUTPATIENT
Start: 2021-08-12 | End: 2021-08-12

## 2021-08-12 RX ADMIN — LIDOCAINE HYDROCHLORIDE 5 ML: 10 INJECTION, SOLUTION EPIDURAL; INFILTRATION; INTRACAUDAL; PERINEURAL at 15:54

## 2021-08-12 NOTE — PROGRESS NOTES
Interventional Radiology Brief Post Procedure Note    Procedure: Sinogram    Proceduralist[facility] Stewart [facility]    Assistant: ANGEL Gordon    Time Out: Prior to the start of the procedure and with procedural staff participation, I verbally confirmed the patient s identity using two indicators, relevant allergies, that the procedure was appropriate and matched the consent or emergent situation, and that the correct equipment/implants were available. Immediately prior to starting the procedure I conducted the Time Out with the procedural staff and re-confirmed the patient s name, procedure, and site/side. (The Joint Commission universal protocol was followed.)  Yes    Medications   Medication Event Details Admin User Admin Time       Sedation: None. Local Anesthestic used    Findings: Sinogram demonstrates persistent complex fistula to bowel. Drain was pulled back approximately 1.5 cm.     Estimated Blood Loss: < 0.5 mL's     Fluoroscopy Time:  < 1 minute(s)    SPECIMENS: None    Complications: 1. None     Condition: Stable    Plan: Return to IR in 1 week for repeat Sinogram and evaluation.    Comments: See dictated procedure note for full details.    Som Stewart PA-C

## 2021-08-14 DIAGNOSIS — Z11.59 ENCOUNTER FOR SCREENING FOR OTHER VIRAL DISEASES: ICD-10-CM

## 2021-08-19 ENCOUNTER — ANCILLARY PROCEDURE (OUTPATIENT)
Dept: GENERAL RADIOLOGY | Facility: CLINIC | Age: 41
End: 2021-08-19
Attending: PHYSICIAN ASSISTANT
Payer: COMMERCIAL

## 2021-08-19 DIAGNOSIS — K63.2 ENTEROCUTANEOUS FISTULA: ICD-10-CM

## 2021-08-19 DIAGNOSIS — Z11.59 ENCOUNTER FOR SCREENING FOR OTHER VIRAL DISEASES: ICD-10-CM

## 2021-08-19 DIAGNOSIS — K63.2 ENTEROCUTANEOUS FISTULA: Primary | ICD-10-CM

## 2021-08-19 PROCEDURE — 49424 ASSESS CYST CONTRAST INJECT: CPT | Performed by: PHYSICIAN ASSISTANT

## 2021-08-19 PROCEDURE — 99207 PR SATISFY VISIT NUMBER: CPT | Performed by: PHYSICIAN ASSISTANT

## 2021-08-19 PROCEDURE — 76080 X-RAY EXAM OF FISTULA: CPT | Performed by: PHYSICIAN ASSISTANT

## 2021-08-19 RX ORDER — IOPAMIDOL 510 MG/ML
100 INJECTION, SOLUTION INTRAVASCULAR ONCE
Status: COMPLETED | OUTPATIENT
Start: 2021-08-19 | End: 2021-08-19

## 2021-08-19 RX ORDER — LIDOCAINE HYDROCHLORIDE 10 MG/ML
5 INJECTION, SOLUTION EPIDURAL; INFILTRATION; INTRACAUDAL; PERINEURAL ONCE
Status: COMPLETED | OUTPATIENT
Start: 2021-08-19 | End: 2021-08-19

## 2021-08-19 RX ADMIN — IOPAMIDOL 20 ML: 510 INJECTION, SOLUTION INTRAVASCULAR at 14:07

## 2021-08-19 RX ADMIN — LIDOCAINE HYDROCHLORIDE 5 ML: 10 INJECTION, SOLUTION EPIDURAL; INFILTRATION; INTRACAUDAL; PERINEURAL at 14:08

## 2021-08-19 NOTE — PROGRESS NOTES
Interventional Radiology Brief Post Procedure Note    Procedure: IR Sinogram    Proceduralist: Jace Al PA-C    Assistant: None    Time Out: Prior to the start of the procedure and with procedural staff participation, I verbally confirmed the patient s identity using two indicators, relevant allergies, that the procedure was appropriate and matched the consent or emergent situation, and that the correct equipment/implants were available. Immediately prior to starting the procedure I conducted the Time Out with the procedural staff and re-confirmed the patient s name, procedure, and site/side. (The Joint Commission universal protocol was followed.)  Yes    Medications   Medication Event Details Admin User Admin Time       Sedation: None. Local Anesthestic used    Findings: Completed image guided sinogram and pull back of RRR drain. Drain was sitting right at the opening of the fistula to bowel. Catheter was pulled back about 1.5 cm. Patient tolerated the procedure well.     Estimated Blood Loss: Minimal    Fluoroscopy Time:  less than 1 minute(s)    SPECIMENS: None    Complications: 1. None     Condition: Stable    Plan: Return in one week for sinogram and possible pull back #4.     Comments: See dictated procedure note for full details.    Jace Al PA-C

## 2021-08-25 ENCOUNTER — ANCILLARY PROCEDURE (OUTPATIENT)
Dept: GENERAL RADIOLOGY | Facility: CLINIC | Age: 41
End: 2021-08-25
Attending: PHYSICIAN ASSISTANT
Payer: COMMERCIAL

## 2021-08-25 DIAGNOSIS — K63.2 ENTEROCUTANEOUS FISTULA: ICD-10-CM

## 2021-08-25 PROCEDURE — 76080 X-RAY EXAM OF FISTULA: CPT | Performed by: PHYSICIAN ASSISTANT

## 2021-08-25 PROCEDURE — 20501 NJX SINUS TRACT DIAGNOSTIC: CPT | Performed by: PHYSICIAN ASSISTANT

## 2021-08-25 RX ORDER — IOPAMIDOL 510 MG/ML
100 INJECTION, SOLUTION INTRAVASCULAR ONCE
Status: COMPLETED | OUTPATIENT
Start: 2021-08-25 | End: 2021-08-25

## 2021-08-25 RX ADMIN — IOPAMIDOL 20 ML: 510 INJECTION, SOLUTION INTRAVASCULAR at 13:49

## 2021-09-01 ENCOUNTER — TELEPHONE (OUTPATIENT)
Dept: INTERVENTIONAL RADIOLOGY/VASCULAR | Facility: CLINIC | Age: 41
End: 2021-09-01

## 2021-09-01 NOTE — TELEPHONE ENCOUNTER
Interventional Radiology Telephone Note  09/01/21       40-year-old female with history of gallstone pancreatitis status post cholecystectomy c/b acute necrotizing pancreatitis status post 14 Fr pigtail IR drain  placement on 6/15/2021.     Patient was last seen on 7/12/2021 for a  sinogram at which time a fistula to small bowel was present. The  patient had minimal outputs in the decision was made to cap her drain.  About a week later she was seen by Dr. Aguiar due to abdominal pain  at which time her drain was reconnected to gravity.     Red rubber retracted 3 cm out of bowel 8/19.   Was last seen on 8/25/2021 for IR sinogram and a fistula was not visualized at that time and plan is for no flushing and for patient to monitor output.      Received call from patient who reports that there has been less than 1 ml of fluid from her IR drain for the past 7 days.  Denies any abdominal pain, enrrique-catheter pain or leaking, fevers or chills.    Will plan on CT with sinogram as planned for 4 weeks time (3 weeks from today).    Information passed on to Richie Ragland PA-C.      Danica Licea PA-C  Interventional Radiology  Pager: [phone]

## 2021-09-07 ENCOUNTER — TELEPHONE (OUTPATIENT)
Dept: INTERVENTIONAL RADIOLOGY/VASCULAR | Facility: CLINIC | Age: 41
End: 2021-09-07

## 2021-09-07 DIAGNOSIS — Z11.59 ENCOUNTER FOR SCREENING FOR OTHER VIRAL DISEASES: Primary | ICD-10-CM

## 2021-09-07 DIAGNOSIS — K85.12 ACUTE BILIARY PANCREATITIS WITH INFECTED NECROSIS: Primary | ICD-10-CM

## 2021-09-07 NOTE — TELEPHONE ENCOUNTER
Patient calling IR with reports of redness, fevers, and discharge from drain site. Was told by GI to contact IR. See prior notes by IR.    Patient needs sooner follow-up.    Recommend CT abdomen and sinogram of drain.    40-year-old female with history of gallstone pancreatitis status post cholecystectomy c/b acute necrotizing pancreatitis status post 14 Fr pigtail IR drain  placement on 6/15/2021.     Will forward to schedulers, [phone].

## 2021-09-08 ENCOUNTER — APPOINTMENT (OUTPATIENT)
Dept: MEDSURG UNIT | Facility: CLINIC | Age: 41
End: 2021-09-08
Attending: PHYSICIAN ASSISTANT
Payer: COMMERCIAL

## 2021-09-08 ENCOUNTER — HOSPITAL ENCOUNTER (OUTPATIENT)
Dept: CT IMAGING | Facility: CLINIC | Age: 41
End: 2021-09-08
Attending: PHYSICIAN ASSISTANT
Payer: COMMERCIAL

## 2021-09-08 ENCOUNTER — HOSPITAL ENCOUNTER (OUTPATIENT)
Facility: CLINIC | Age: 41
Discharge: HOME | End: 2021-09-08
Attending: INTERNAL MEDICINE | Admitting: INTERNAL MEDICINE
Payer: COMMERCIAL

## 2021-09-08 ENCOUNTER — LAB (OUTPATIENT)
Dept: LAB | Facility: CLINIC | Age: 41
End: 2021-09-08
Attending: RADIOLOGY
Payer: COMMERCIAL

## 2021-09-08 ENCOUNTER — APPOINTMENT (OUTPATIENT)
Dept: INTERVENTIONAL RADIOLOGY/VASCULAR | Facility: CLINIC | Age: 41
End: 2021-09-08
Attending: PHYSICIAN ASSISTANT
Payer: COMMERCIAL

## 2021-09-08 VITALS
BODY MASS INDEX: 26.41 KG/M2 | OXYGEN SATURATION: 99 % | TEMPERATURE: 97.9 F | RESPIRATION RATE: 16 BRPM | SYSTOLIC BLOOD PRESSURE: 129 MMHG | HEART RATE: 70 BPM | WEIGHT: 195 LBS | DIASTOLIC BLOOD PRESSURE: 83 MMHG | HEIGHT: 72 IN

## 2021-09-08 DIAGNOSIS — K85.12 ACUTE BILIARY PANCREATITIS WITH INFECTED NECROSIS: ICD-10-CM

## 2021-09-08 DIAGNOSIS — Z11.59 ENCOUNTER FOR SCREENING FOR OTHER VIRAL DISEASES: ICD-10-CM

## 2021-09-08 LAB
B-HCG SERPL-ACNC: <1 IU/L (ref 0–5)
SARS-COV-2 RNA RESP QL NAA+PROBE: NEGATIVE

## 2021-09-08 PROCEDURE — 74150 CT ABDOMEN W/O CONTRAST: CPT | Mod: 26 | Performed by: RADIOLOGY

## 2021-09-08 PROCEDURE — U0003 INFECTIOUS AGENT DETECTION BY NUCLEIC ACID (DNA OR RNA); SEVERE ACUTE RESPIRATORY SYNDROME CORONAVIRUS 2 (SARS-COV-2) (CORONAVIRUS DISEASE [COVID-19]), AMPLIFIED PROBE TECHNIQUE, MAKING USE OF HIGH THROUGHPUT TECHNOLOGIES AS DESCRIBED BY CMS-2020-01-R: HCPCS | Mod: 90 | Performed by: PATHOLOGY

## 2021-09-08 PROCEDURE — 76080 X-RAY EXAM OF FISTULA: CPT | Mod: 26 | Performed by: PHYSICIAN ASSISTANT

## 2021-09-08 PROCEDURE — U0005 INFEC AGEN DETEC AMPLI PROBE: HCPCS | Mod: 90 | Performed by: PATHOLOGY

## 2021-09-08 PROCEDURE — 20501 NJX SINUS TRACT DIAGNOSTIC: CPT

## 2021-09-08 PROCEDURE — 49424 ASSESS CYST CONTRAST INJECT: CPT | Performed by: PHYSICIAN ASSISTANT

## 2021-09-08 PROCEDURE — 36592 COLLECT BLOOD FROM PICC: CPT | Performed by: RADIOLOGY

## 2021-09-08 PROCEDURE — 84702 CHORIONIC GONADOTROPIN TEST: CPT | Performed by: RADIOLOGY

## 2021-09-08 PROCEDURE — 17250 CHEM CAUT OF GRANLTJ TISSUE: CPT

## 2021-09-08 PROCEDURE — 17250 CHEM CAUT OF GRANLTJ TISSUE: CPT | Performed by: PHYSICIAN ASSISTANT

## 2021-09-08 PROCEDURE — 74150 CT ABDOMEN W/O CONTRAST: CPT

## 2021-09-08 PROCEDURE — 258N000003 HC RX IP 258 OP 636: Performed by: NURSE PRACTITIONER

## 2021-09-08 PROCEDURE — 999N000132 HC STATISTIC PP CARE STAGE 1

## 2021-09-08 PROCEDURE — 250N000009 HC RX 250: Performed by: PHYSICIAN ASSISTANT

## 2021-09-08 RX ORDER — FENTANYL CITRATE 50 UG/ML
25-50 INJECTION, SOLUTION INTRAMUSCULAR; INTRAVENOUS EVERY 5 MIN PRN
Status: DISCONTINUED | OUTPATIENT
Start: 2021-09-08 | End: 2021-09-08 | Stop reason: HOSPADM

## 2021-09-08 RX ORDER — NALOXONE HYDROCHLORIDE 0.4 MG/ML
0.4 INJECTION, SOLUTION INTRAMUSCULAR; INTRAVENOUS; SUBCUTANEOUS
Status: DISCONTINUED | OUTPATIENT
Start: 2021-09-08 | End: 2021-09-08 | Stop reason: HOSPADM

## 2021-09-08 RX ORDER — SODIUM CHLORIDE 9 MG/ML
INJECTION, SOLUTION INTRAVENOUS CONTINUOUS
Status: DISCONTINUED | OUTPATIENT
Start: 2021-09-08 | End: 2021-09-08 | Stop reason: HOSPADM

## 2021-09-08 RX ORDER — NALOXONE HYDROCHLORIDE 0.4 MG/ML
0.2 INJECTION, SOLUTION INTRAMUSCULAR; INTRAVENOUS; SUBCUTANEOUS
Status: DISCONTINUED | OUTPATIENT
Start: 2021-09-08 | End: 2021-09-08 | Stop reason: HOSPADM

## 2021-09-08 RX ORDER — SULFAMETHOXAZOLE/TRIMETHOPRIM 800-160 MG
1 TABLET ORAL 2 TIMES DAILY
Qty: 14 TABLET | Refills: 0 | Status: SHIPPED | OUTPATIENT
Start: 2021-09-08 | End: 2021-09-15

## 2021-09-08 RX ORDER — FLUMAZENIL 0.1 MG/ML
0.2 INJECTION, SOLUTION INTRAVENOUS
Status: DISCONTINUED | OUTPATIENT
Start: 2021-09-08 | End: 2021-09-08 | Stop reason: HOSPADM

## 2021-09-08 RX ORDER — LIDOCAINE 40 MG/G
CREAM TOPICAL
Status: DISCONTINUED | OUTPATIENT
Start: 2021-09-08 | End: 2021-09-08 | Stop reason: HOSPADM

## 2021-09-08 RX ADMIN — SODIUM CHLORIDE: 9 INJECTION, SOLUTION INTRAVENOUS at 13:36

## 2021-09-08 RX ADMIN — SILVER NITRATE APPLICATORS 4 APPLICATOR: 25; 75 STICK TOPICAL at 14:32

## 2021-09-08 ASSESSMENT — MIFFLIN-ST. JEOR: SCORE: 1651.51

## 2021-09-08 NOTE — PROCEDURES
Interventional Radiology Brief Post Procedure Note    Procedure: IR SINOGRAM INJECTION THERAPEUTIC    Proceduralist: Danica Licea PA-C    Time Out: Prior to the start of the procedure and with procedural staff participation, I verbally confirmed the patient s identity using two indicators, relevant allergies, that the procedure was appropriate and matched the consent or emergent situation, and that the correct equipment/implants were available. Immediately prior to starting the procedure I conducted the Time Out with the procedural staff and re-confirmed the patient s name, procedure, and site/side. (The Joint Commission universal protocol was followed.)  Yes    Sedation: None. Local Anesthestic used    Findings: Completed image guided flouro study.  Linear projection of contrast without signs of persistalsis.  Red rubber [place] retracted 1.5 cm and retention suture applied in area without erythema    Estimated Blood Loss: None    Fluoroscopy Time: 0.4 minute(s)    SPECIMENS: None    Complications: 1. None     Condition: Stable    Plan: Transport to  for recovery.  2 week return for repeat sinogram.  Bactrim DS for 7 days sent to pharmacy.      Comments: See dictated procedure note for full details.    Danica Licea PA-C

## 2021-09-08 NOTE — PROGRESS NOTES
Prepped for sinogram.  Tube right flank is off to drainage & patient states it has been this way since last procedure two weeks ago.  Tube replaced end of July & that was the last time she flushed it.  Has aching pain at insertion site right flank much of time, rates it at 5/10 now.  Drove self, can get a ride if necessary (due to sedation).  H & P current.  Labs pending.  Consent signed.

## 2021-09-08 NOTE — PROGRESS NOTES
Patient tolerated recovery stage well. VSS, right flank site clean/dry/intact, no hematoma.  Drainage tube to leg bag per gravity drain, no returns at this time.  C/o soreness at site, does not want pain meds at this time.  Patient tolerated PO fluids, does not want food now. Teaching was done and discharge instructions were given. Patient ambulated and PIV was removed. Patient discharged from the hospital via ambulatory to home per self.  Prescriptions obtained from pharmacy.

## 2021-09-08 NOTE — DISCHARGE INSTRUCTIONS
"[facility]  Interventional Radiology   Drainage Tube Instructions      AFTER YOU GO HOME:    Have an adult stay with you for 24 hours.     Drink plenty of fluids and resume your regular diet.    For 24 hours:     No heavy lifting greater than 10 lb until instructed by your physician     Call Your Physician if:    You develop nausea or vomiting.    Your develop hives or rash or unexplained itching    Additional Instructions: Please call for the following problems:    No fluid draining from the tube, check that the tube is not kinked or if stop cock is closed.    Skin around tube is red, painful or has any drainage.    You have increased pain in your abdomen    Fever greater than 100.5 F and chills    You feel nauseated and  \"just not right\"      Change dressing every 48 hour or when it gets wet    Interventional Radiology Department    Physician:  RORO Hernandez PA-C                  Date:September 8, 2021  Telephone numbers: [phone]...Monday-Friday 8:00am-4:30pm                                  [phone]... After 4:30 Monday-Friday, Weekends and Holiday. Ask for the Interventional Radiologist on call. Someone is on call 24 hours a day.                                  "

## 2021-09-08 NOTE — PROGRESS NOTES
Returned from IR to 2A post procedure.  C/o soreness at right flank tube insertion site.  Site C/D/I with leg bag to gravity drain without drainage at this time.  Given fluids, does not want food at this time.

## 2021-09-08 NOTE — PRE-PROCEDURE
GENERAL PRE-PROCEDURE:   Procedure:  Sinogram with possible catheter exchange  Date/Time:  9/8/2021 1:02 PM    Written consent obtained?: Yes    Risks and benefits: Risks, benefits and alternatives were discussed    Consent given by:  Patient  Patient states understanding of procedure being performed: Yes    Patient's understanding of procedure matches consent: Yes    Procedure consent matches procedure scheduled: Yes    Expected level of sedation:  Moderate  Appropriately NPO:  Yes  ASA Class:  3  Mallampati  :  Grade 2- soft palate, base of uvula, tonsillar pillars, and portion of posterior pharyngeal wall visible  Lungs:  Lungs clear with good breath sounds bilaterally  Heart:  Normal heart sounds and rate  History & Physical reviewed:  History and physical reviewed and no updates needed  Statement of review:  I have reviewed the lab findings, diagnostic data, medications, and the plan for sedation    41-year-old female status post 14 Monegasque drain placed on Adry 15, 2021 now presents with a four-day history of leaking around the red rubber [place] drain and erythema.  Patient reports that she had fevers up to 100 days ago.     Denies any abdominal pain, nausea vomiting, diarrhea and trauma to the catheter. she's had a red rubber [place] drain placed for which it's been retracted for a total of 3 cm. She's had a draining catheter for the past 2 weeks.     Drain has been capped for the past 2 weeks.

## 2021-09-10 NOTE — PROGRESS NOTES
Interventional Radiology Telephone Note  09/10/21   10:21 AM    Spoke with patient just now who came in for Red Rubber [place] site evaluation with subsequent pullback and initiation of Bactrim for cellulitis.      Still reports some drain irritation.      Had her remove dressing to assess the area.  Area of redness appears much improved to the patient.      There has been no output into the collection bag.  There is a small amount of clear fluid in tubing likely from the flush from the procedure.  Output no longer appears tan colored like before.      Denies any fevers, or chills.      Recommend continued guaze barrier to drain site and monitor output.  She has been taking Bactrim BID and will complete her 7 day course.      Return for scheduled sinogram with possible pullback of red rubber [place] drain or removal.      Danica Licea PA-C  Interventional Radiology  Pager: [phone]

## 2021-09-13 DIAGNOSIS — Z11.59 ENCOUNTER FOR SCREENING FOR OTHER VIRAL DISEASES: ICD-10-CM

## 2021-09-17 DIAGNOSIS — K85.90 PANCREATITIS: Primary | ICD-10-CM

## 2021-09-17 RX ORDER — TRAMADOL HYDROCHLORIDE 50 MG/1
50 TABLET ORAL 2 TIMES DAILY PRN
Qty: 20 TABLET | Refills: 0
Start: 2021-09-17

## 2021-09-22 ENCOUNTER — APPOINTMENT (OUTPATIENT)
Dept: INTERVENTIONAL RADIOLOGY/VASCULAR | Facility: CLINIC | Age: 41
End: 2021-09-22
Attending: PHYSICIAN ASSISTANT
Payer: COMMERCIAL

## 2021-09-22 ENCOUNTER — HOSPITAL ENCOUNTER (OUTPATIENT)
Facility: CLINIC | Age: 41
Discharge: HOME | End: 2021-09-22
Attending: INTERNAL MEDICINE | Admitting: PHYSICIAN ASSISTANT
Payer: COMMERCIAL

## 2021-09-22 VITALS
HEART RATE: 72 BPM | DIASTOLIC BLOOD PRESSURE: 76 MMHG | OXYGEN SATURATION: 100 % | SYSTOLIC BLOOD PRESSURE: 126 MMHG | RESPIRATION RATE: 14 BRPM

## 2021-09-22 DIAGNOSIS — K85.12 ACUTE BILIARY PANCREATITIS WITH INFECTED NECROSIS: ICD-10-CM

## 2021-09-22 PROCEDURE — 49424 ASSESS CYST CONTRAST INJECT: CPT | Performed by: PHYSICIAN ASSISTANT

## 2021-09-22 PROCEDURE — 20501 NJX SINUS TRACT DIAGNOSTIC: CPT

## 2021-09-22 PROCEDURE — 76080 X-RAY EXAM OF FISTULA: CPT | Mod: 26 | Performed by: PHYSICIAN ASSISTANT

## 2021-09-22 RX ORDER — IODIXANOL 320 MG/ML
50 INJECTION, SOLUTION INTRAVASCULAR ONCE
Status: DISCONTINUED | OUTPATIENT
Start: 2021-09-22 | End: 2021-09-22 | Stop reason: HOSPADM

## 2021-09-22 NOTE — PROCEDURES
Interventional Radiology Brief Post Procedure Note    Procedure: IR SINOGRAM INJECTION DIAGNOSTIC    Proceduralist: Jace Al PA-C    Assistant: None    Time Out: Prior to the start of the procedure and with procedural staff participation, I verbally confirmed the patient s identity using two indicators, relevant allergies, that the procedure was appropriate and matched the consent or emergent situation, and that the correct equipment/implants were available. Immediately prior to starting the procedure I conducted the Time Out with the procedural staff and re-confirmed the patient s name, procedure, and site/side. (The Joint Commission universal protocol was followed.)  Yes    Medications   Medication Event Details Admin User Admin Time       Sedation: None    Findings: Completed image guided sinogram of patient's 14 Croatian red rubber [place] drain. Patient reports miniscule output from the drain and denies pain, fever or chills. Patients localized cellulitis around the drain has resolved. Injection of contrast and saline shows no collection and no fistula to bowel. Patient's case reviewed in IR rounds - no fistula present - drain appropriate for removal. Drain removed - sterile dressing applied.     Estimated Blood Loss: None    Fluoroscopy Time: 0.2 minute(s)    SPECIMENS: None    Complications: 1. None     Condition: Stable    Plan: Follow-up per primary team.     Comments: See dictated procedure note for full details.    Jace Al PA-C

## 2021-09-22 NOTE — PROGRESS NOTES
Patient Name: Alexandra Nunez  Medical Record Number: [id number]  Today's Date: 9/22/2021    Procedure: Sinogram of right abdominal drain, drain removal.   Proceduralist: Jace Al PA-C.    Patient in room: 1031  Procedure Start: 1052  Procedure end: 1100  Patient out of room: 1105    Report given to: patient    Other Notes: Pt arrived to IR room 2 from [facility] waiting room, patient ambulates independently. Consent reviewed. Pt denies any questions or concerns regarding procedure. Pt positioned supine and monitored per protocol. Pt tolerated procedure without any noted complications. Pt transferred back to gold waiting room, patient ambulates independently.

## 2021-09-29 ENCOUNTER — PATIENT OUTREACH (OUTPATIENT)
Dept: GASTROENTEROLOGY | Facility: CLINIC | Age: 41
End: 2021-09-29

## 2021-09-29 NOTE — TELEPHONE ENCOUNTER
"Called pt related to Mychart messages regarding LTD forms and c/o ongoing pain.     PCP must fill out forms related to disability/restrictions and needs to follow up with ongoing pain medication needs.     Per patient she's having pain in her back \"where the pancreas is\", had pain when she had her tube, still feels a little cramping in her stomach, its been a week since a tube has been out. No nausea, vomiting , fever. Ice and heat help, tyelnol helps. Advised continued use of these methods. Encouraged to follow up with PCP, pt says she still has a few tramadol to help with pain. Asked that she call us back is pain persists.     Left message.    Pt called back and re reviewed above.     ML  "

## 2021-10-06 ENCOUNTER — CARE COORDINATION (OUTPATIENT)
Dept: GASTROENTEROLOGY | Facility: CLINIC | Age: 41
End: 2021-10-06

## 2021-10-18 ENCOUNTER — VIRTUAL VISIT (OUTPATIENT)
Dept: GASTROENTEROLOGY | Facility: CLINIC | Age: 41
End: 2021-10-18
Payer: COMMERCIAL

## 2021-10-18 ENCOUNTER — TELEPHONE (OUTPATIENT)
Dept: SURGERY | Facility: CLINIC | Age: 41
End: 2021-10-18

## 2021-10-18 DIAGNOSIS — K85.12 ACUTE BILIARY PANCREATITIS WITH INFECTED NECROSIS: Primary | ICD-10-CM

## 2021-10-18 PROCEDURE — 99213 OFFICE O/P EST LOW 20 MIN: CPT | Mod: 95 | Performed by: INTERNAL MEDICINE

## 2021-10-18 NOTE — NURSING NOTE
Chief Complaint   Patient presents with     KATIE Morris, is participating in a video visit today for a follow up reagrding abdmonial pain after PTC removal,as reported by patient.       There were no vitals filed for this visit.    There is no height or weight on file to calculate BMI.      Mattie Claudio LPN

## 2021-10-18 NOTE — PROGRESS NOTES
"Alexandra is a 41 year old who is being evaluated via a billable video visit.      How would you like to obtain your AVS? MyChart  If the video visit is dropped, the invitation should be resent by: Text to cell phone: [phone]  Will anyone else be joining your video visit? No       Video-Visit Details    Type of service:  Video Visit   Originating Location (pt. Location): Home    Distant Location (provider location):  [facility] [place]     Platform used for Video Visit: Mediaspectrum     Reason for visit: Abdominal pain  HPI:    CLINICAL HISTORY: 40-year-old female with a history of gallstone  pancreatitis status post cholecystectomy on April 28, 2021 and ERCP on  April 29, 2021 for choledocholithiasis at outside hospital was  admitted on June 13, 2021 to June 23, 2021 for acute necrotizing  pancreatitis with infected walled off necrotic collection, complicated by enteric fistula formation.  She underwent IR drainage and placement of 14 Albanian catheter on  Adry 15, 2021 (fluid cultures showing beta hemolytic streptococcus  anginsosus, Enterobacter Cloacae complex, Candida tropicalis, candida  dubliniensis; fluid lipase 69). Drain removed on 9/22/21, at which time sinogram showed no fluid collection or evidence of fistula.    Since then, she has had mild discomfort to her back, over her midsection and lower back. She describes the pain as \"similar to when I had my gallbladder taken out\". Her pain has been episodic, with no clear provoking factors. She feels her pain may be due to increased mobility since her tube has been removed. She believes that the pain is present roughly 50% of the time when she is awake. She recently went to the chiropractor to have an adjustment on her back. Overall, she believes this pain is improving, stating the pain is becoming less prevalent over the past week. She denies any fevers or chills, nausea or vomiting.     PMH:  Necrotizing " pancreatitis  Gallstone pancreatitis    SHx:    Cholecystectomy  Retroperitoneal drain placement    FHx:  No family history on file.    Immunizations:  Immunization status is unknown.    Allergies:  Allergies   Allergen Reactions     Nkda [No Known Drug Allergies]      Medications:  Current Outpatient Medications   Medication     albuterol (PROAIR HFA/PROVENTIL HFA/VENTOLIN HFA) 108 (90 Base) MCG/ACT inhaler     albuterol (PROVENTIL) (2.5 MG/3ML) 0.083% neb solution     ALPRAZolam (XANAX) 0.5 MG tablet     fluticasone (FLOVENT HFA) 110 MCG/ACT inhaler     ibuprofen (ADVIL/MOTRIN) 100 MG/5ML suspension     levonorgestrel (MIRENA) 20 MCG/24HR IUD     ondansetron (ZOFRAN) 4 MG tablet     sodium chloride, PF, (SALINE FLUSH) 0.9% PF flush     traMADol (ULTRAM) 50 MG tablet     Vitamin D3 (CHOLECALCIFEROL) 25 mcg (1000 units) tablet     oxyCODONE (ROXICODONE) 5 MG tablet     No current facility-administered medications for this visit.     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 Plans to return to full time work soon.    ROS:  Please see HPI. All other systems were reviewed and are found to be negative and non-contributory.     Diagnostic Studies:  IR Sinogram (Retroperitoneal drain removal) 9/22/21:  DESCRIPTION:  images were obtained documenting current catheter  position. Fluoroscopic evaluation during injection of 5 mL of  contrast/saline mixture revealed the drain with no collection and no  evidence of fistula - most of the injected contrast and saline tract  along the catheter leak skin. The contrast was aspirated. The drain  was removed.  IMPRESSION: Completed sinogram through existing drain demonstrating no  cavity, no identified fistula. Drain removed.    Assessment/Plan:  41 year old female with history of necrotizing pancreatitis in June 2021 complicated by enteric fistula requiring retroperitoneal drain placement now with ongoing  back/abdominal pain after drain removal on 9/22/21. There was no obvious fluid collection or fistula seen on drain removal at that time. Her pain seems to be episodic and not specifically provoked by movements/eating. Though her pain is improving, and she has no fevers, there is need to investigate for possible fluid re-accumulation.    -CT abdomen to be completed at [facility]  -Immediate follow up if fluid collection is seen  -Otherwise follow up in 2-3 months     Pt seen and discussed with Dr. Aguiar.    Boone Cloud MS4 on 10/18/2021 at 12:31 PM    Seen and examined with GI MS4, in entirety of joint video visit,  agree with findings and recommendations.  Genrally doing pretty well but residual pain since last perc drain removed 1 mo ago. RO recurrent collection, fistula  Johny Aguiar MD GI Staff

## 2021-10-18 NOTE — TELEPHONE ENCOUNTER
LM for patient in regards to video visit appointment today at 12:00 with .    Will try back in 10 min's.    Call back number was provided.

## 2021-10-18 NOTE — LETTER
"    10/18/2021         RE: Alexandra Nunez  [address] [place] [address]        Dear Colleague,    Thank you for referring your patient, Alexandra Nunez, to the [facility] [place]. Please see a copy of my visit note below.    Reason for visit: Abdominal pain  HPI:    CLINICAL HISTORY: 40-year-old female with a history of gallstone  pancreatitis status post cholecystectomy on April 28, 2021 and ERCP on  April 29, 2021 for choledocholithiasis at outside hospital was  admitted on June 13, 2021 to June 23, 2021 for acute necrotizing  pancreatitis with infected walled off necrotic collection, complicated by enteric fistula formation.  She underwent IR drainage and placement of 14 British catheter on  Adry 15, 2021 (fluid cultures showing beta hemolytic streptococcus  anginsosus, Enterobacter Cloacae complex, Candida tropicalis, candida  dubliniensis; fluid lipase 69). Drain removed on 9/22/21, at which time sinogram showed no fluid collection or evidence of fistula.    Since then, she has had mild discomfort to her back, over her midsection and lower back. She describes the pain as \"similar to when I had my gallbladder taken out\". Her pain has been episodic, with no clear provoking factors. She feels her pain may be due to increased mobility since her tube has been removed. She believes that the pain is present roughly 50% of the time when she is awake. She recently went to the chiropractor to have an adjustment on her back. Overall, she believes this pain is improving, stating the pain is becoming less prevalent over the past week. She denies any fevers or chills, nausea or vomiting.     PMH:  Necrotizing pancreatitis  Gallstone pancreatitis    SHx:    Cholecystectomy  Retroperitoneal drain placement    FHx:  No family history on file.    Immunizations:  Immunization status is unknown.    Allergies:  Allergies   Allergen Reactions     Nkda [No Known Drug Allergies]  "     Medications:  Current Outpatient Medications   Medication     albuterol (PROAIR HFA/PROVENTIL HFA/VENTOLIN HFA) 108 (90 Base) MCG/ACT inhaler     albuterol (PROVENTIL) (2.5 MG/3ML) 0.083% neb solution     ALPRAZolam (XANAX) 0.5 MG tablet     fluticasone (FLOVENT HFA) 110 MCG/ACT inhaler     ibuprofen (ADVIL/MOTRIN) 100 MG/5ML suspension     levonorgestrel (MIRENA) 20 MCG/24HR IUD     ondansetron (ZOFRAN) 4 MG tablet     sodium chloride, PF, (SALINE FLUSH) 0.9% PF flush     traMADol (ULTRAM) 50 MG tablet     Vitamin D3 (CHOLECALCIFEROL) 25 mcg (1000 units) tablet     oxyCODONE (ROXICODONE) 5 MG tablet     No current facility-administered medications for this visit.     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    ROS:  Please see HPI. All other systems were reviewed and are found to be negative and non-contributory.     Diagnostic Studies:  IR Sinogram (Retroperitoneal drain removal) 9/22/21:  DESCRIPTION:  images were obtained documenting current catheter  position. Fluoroscopic evaluation during injection of 5 mL of  contrast/saline mixture revealed the drain with no collection and no  evidence of fistula - most of the injected contrast and saline tract  along the catheter leak skin. The contrast was aspirated. The drain  was removed.  IMPRESSION: Completed sinogram through existing drain demonstrating no  cavity, no identified fistula. Drain removed.    Assessment/Plan:  41 year old female with history of necrotizing pancreatitis in June 2021 complicated by enteric fistula requiring retroperitoneal drain placement now with ongoing back/abdominal pain after drain removal on 9/22/21. There was no obvious fluid collection or fistula seen on drain removal at that time. Her pain seems to be episodic and not specifically provoked by movements/eating. Though her pain is improving, and she has no  fevers, there is need to investigate for possible fluid re-accumulation.    -CT abdomen to be completed at [facility]  -Immediate follow up if fluid collection is seen  -Otherwise follow up in 2-3 months     Pt seen and discussed with Dr. Aguiar.    Boone Cloud, MS4 on 10/18/2021 at 12:31 PM    Seen and examined with GI MS4, in entirety of joint video visit,  agree with findings and recommendations.  Genrally doing pretty well but residual pain since last perc drain removed 1 mo ago. RO recurrent collection, fistula  Johny Aguiar MD GI Staff      Again, thank you for allowing me to participate in the care of your patient.      Sincerely,    Johny Aguiar MD

## 2021-10-19 NOTE — PATIENT INSTRUCTIONS
Follow up:    Dr. Aguiar has outlined the following steps after your recent clinic visit:      -CT abdomen to be completed at [facility]. Please call to schedule the imaging, [phone]    -Immediate follow up if fluid collection is seen- Dr. Aguiar will reach out if this is a concern.    -Otherwise follow up in 2-3 months. A video visit has been scheduled for you on 1/5/22 at noon, please let us know if this date/time does not work for you.      Please call with any questions or concerns regarding your clinic visit today.    It is a pleasure being involved in your health care.    Contacts post-consultation depending on your need:    Schedule Clinic Appointments            [phone] # 1   M-F 7:30 - 5 pm    Jessica Damon RN Care Coordinator  [phone]    Geoff Cook LPN    [phone]     OR Procedure Scheduling                             [phone]    My Chart is available 24 hours a day and is a secure way to access your records and communicate with your care team.  I strongly recommend signing up if you haven't already done so, if you are comfortable with computers.  If you would like to inquire about this or are having problems with My Chart access, you may call [phone] or go online at [email or web address].  Please allow at least 24 hours for a response and extra time on weekends and Holidays.

## 2021-10-21 ENCOUNTER — ANCILLARY PROCEDURE (OUTPATIENT)
Dept: CT IMAGING | Facility: CLINIC | Age: 41
End: 2021-10-21
Attending: INTERNAL MEDICINE
Payer: COMMERCIAL

## 2021-10-21 DIAGNOSIS — K85.12 ACUTE BILIARY PANCREATITIS WITH INFECTED NECROSIS: ICD-10-CM

## 2021-10-21 PROCEDURE — 74160 CT ABDOMEN W/CONTRAST: CPT | Performed by: RADIOLOGY

## 2021-10-21 RX ORDER — IOPAMIDOL 755 MG/ML
119 INJECTION, SOLUTION INTRAVASCULAR ONCE
Status: COMPLETED | OUTPATIENT
Start: 2021-10-21 | End: 2021-10-21

## 2021-10-21 RX ADMIN — IOPAMIDOL 119 ML: 755 INJECTION, SOLUTION INTRAVASCULAR at 14:05

## 2021-10-22 ENCOUNTER — MYC MEDICAL ADVICE (OUTPATIENT)
Dept: GASTROENTEROLOGY | Facility: CLINIC | Age: 41
End: 2021-10-22

## 2021-10-22 DIAGNOSIS — K85.12 ACUTE BILIARY PANCREATITIS WITH INFECTED NECROSIS: Primary | ICD-10-CM

## 2021-10-22 DIAGNOSIS — K85.90 PANCREATITIS: ICD-10-CM

## 2021-10-24 ENCOUNTER — HEALTH MAINTENANCE LETTER (OUTPATIENT)
Age: 41
End: 2021-10-24

## 2021-12-06 ENCOUNTER — PATIENT OUTREACH (OUTPATIENT)
Dept: GASTROENTEROLOGY | Facility: CLINIC | Age: 41
End: 2021-12-06
Payer: COMMERCIAL

## 2021-12-08 NOTE — PROGRESS NOTES
VISIT RECOMMENDATIONS:  1. Trial methocarbamol 750 mg QID PRN for myofascial pain.  Will reassess for efficacy and side effects at next evaluation.  Consider metaxalone as alternative therapy if non beneficial  2. Trial amitriptyline 25 mg at bedtime for myofascial and chronic abdominal pain.  Will reassess for benefit, side effects, and additional titration at next evaluation.  3. Referral placed for Nutrition for evaluation and recommendations regarding dietary constraints in the setting of chronic pancreatitis  4. Physical therapy referral placed for right QL pain s/p drain placement with subsequent myofascial pain    COMPREHENSIVE PAIN CLINIC INITIAL EVALUATION    I had the pleasure of meeting Ms. Alexandra Nunez on 12/9/2021 in the [facility] in consult for Dr. Aguiar with regards to her chronic back and abdominal pain.    Subjective:  The patient is a 41 year old female with past medical history of anxiety, panic disorder, and acute gallstone pancreatitis s/p cholecystectomy c/b necrotizing pancreatitis who presents for evaluation of back and abdominal pain.  The patient's pain began at the end of April following an acute episode of gallstone pancreatitis and has been fluctuating since that time.  She had a percutaneous drain in and her symptoms have improved marginally since removal and remain fluctuant.  She reports that her pain is located primarily in the right low back and radiates to the left shoulder.  The patient describes the pain as jabbing and constant.  She reports that the pain is made worse by certain foods.  Her pain is improved with ibuprofen and tramadol.  In addition, she reports nausea, abdominal pain, and rare hot flashes associated with the pain.  The patient's pain does not have a significant temporal component and varies from day to day, but flares of pain tend to persist for an entire day.  She rates her average pain score at 5/10, but it can be as low as 3/10 or as severe as  7/10.     She denies any new problems with falls or balance, any new numbness or weakness of the arms or legs, any new bowel or bladder incontinence, any night sweats or unexplained fevers, or any sudden or unexpected weight loss.     Current treatments:  Ibuprofen  Tramadol    Previous medication treatments included:  Anti-convulsants: Gabapentin remotely for seizures, side effects  Muscle relaxors: not tried  Anti-depressants: Uncertain  Acetaminophen/NSAIDs: Ibuprofen, beneficial  Topicals: Lidocaine, mild temporary benefit  Opioids: Tramadol PRN    Other treatments have included:  Physical therapy: not tried  Pain Psychology: not tried  Chiropractic: beneficial for other pain  Acupuncture: not tried  TENs Unit: not tried  Injections: not tried  Surgeries: Per HPI    Past Medical History:  Medical history reviewed.   Necrotizing pancreatitis 07/15/2021   Gallstone pancreatitis 04/25/2021   Acute gallstone pancreatitis 04/25/2021   Overview:     Formatting of this note might be different from the original.  Added automatically from request for surgery [id number]     Atypical squamous cells of undetermined significance (ASCUS) on Papanicolaou smear of cervix 02/20/2018   Overview:     Formatting of this note might be different from the original.  From visit on 2/14/18: History of abnormal pap tests? No  2013 NILM  2018 HSIL , Positive for High Risk HPV types other than 16 or 18 37 y.o.  3-1-18 Luther: UTE III  3-23-18 LEEP: UTE III. Margins not clear.   12/2019 ASCUS, HPV High Risk Other Than 16/18 detected 39 y.o.  Plan: Luther     Cardiac pacemaker in situ 11/05/2014   Pacemaker at end of battery life 10/11/2013   Mild intermittent asthma without complication 06/07/2013   Eating disorder 04/13/2010   Overview:     Formatting of this note might be different from the original.  Epic     Generalized anxiety disorder 04/13/2010   Nontoxic multinodular goiter 02/08/2010   Allergic rhinitis 04/22/2009   Overview:      Formatting of this note might be different from the original.  Epic     Insomnia 04/22/2009   Overview:     Formatting of this note might be different from the original.  Epic     Telogen effluvium 03/10/2009   Anxiety state 02/11/2009   Panic disorder without agoraphobia 02/10/2009   Convulsions 12/03/2008   Overview:     Formatting of this note might be different from the original.  Formatting of this note might be different from the original.  Epic       Pacemaker for vasovagal syndrome     Past Surgical History:  Pertinent surgical history reviewed.   PACEMAKER PLACEMENT          LAPAROSCOPY, SURGICAL; CHOLECYSTECTOMY WITH CHOLANGIOGRAPHY 4/28/2021 Abdomen/N/A Procedure: Laparoscopic Cholecystectomy, Cholangiogram; Surgeon: Mauricio Byrd MD; Location: [facility]; Service: General Surgery Trauma     XR ERCP FAUSTINA AND PANCREAS ROUTINE        Medications: Pertinent medications reviewed and updated  Current Outpatient Medications   Medication Sig Dispense Refill     albuterol (PROAIR HFA/PROVENTIL HFA/VENTOLIN HFA) 108 (90 Base) MCG/ACT inhaler Inhale 1-2 puffs into the lungs every 4 hours as needed for shortness of breath / dyspnea or wheezing       albuterol (PROVENTIL) (2.5 MG/3ML) 0.083% neb solution Take 2.5 mg by nebulization every 4 hours as needed for shortness of breath / dyspnea or wheezing       ALPRAZolam (XANAX) 0.5 MG tablet Take 0.5 mg by mouth 2 times daily as needed for anxiety       fluticasone (FLOVENT HFA) 110 MCG/ACT inhaler Inhale 2 puffs into the lungs 2 times daily       ibuprofen (ADVIL/MOTRIN) 100 MG/5ML suspension Take 600 mg by mouth every 6 hours as needed for fever or moderate pain       levonorgestrel (MIRENA) 20 MCG/24HR IUD 1 each by Intrauterine route once       ondansetron (ZOFRAN) 4 MG tablet Take by mouth every 6 hours as needed for nausea       sodium chloride, PF, (SALINE FLUSH) 0.9% PF flush 3 mLs by Intracatheter route every 8 hours       traMADol  (ULTRAM) 50 MG tablet Take 1 tablet (50 mg) by mouth 2 times daily as needed for severe pain 20 tablet 0     Vitamin D3 (CHOLECALCIFEROL) 25 mcg (1000 units) tablet Take 1 tablet by mouth daily       [place] and [place] Prescription Monitoring Program reviewed     Allergies: Pertinent allergies reviewed     Allergies   Allergen Reactions     Nkda [No Known Drug Allergies]      Family History:     Medical History Relation Name Comments   Myocardial Infarction Birth Father        Anxiety Birth Mother        Cancer, Colon Maternal Aunt        Cancer, Other Maternal Grandfather    Oral cancer that metastasized to the brain.     Relation Name Status Comments   Birth Father    Alive     Birth Mother    Alive     Brother    Alive     Daughter    Alive     Maternal Aunt          Maternal Grandfather     Metastatic oral cancer.   Maternal Grandmother         Paternal Grandfather         Paternal Grandmother         Son    Alive      Social history:   Social History     Socioeconomic History     Marital status: Single     Spouse name: Not on file     Number of children: Not on file     Years of education: Not on file     Highest education level: Not on file   Occupational History     Not on file   Tobacco Use     Smoking status: Never Smoker     Smokeless tobacco: Never Used   Substance and Sexual Activity     Alcohol use: Not Currently     Drug use: Not Currently     Sexual activity: Not on file   Other Topics Concern     Not on file   Social History Narrative     Not on file     Social Determinants of Health     Financial Resource Strain: Not on file   Food Insecurity: Not on file   Transportation Needs: Not on file   Physical Activity: Not on file   Stress: Not on file   Social Connections: Not on file   Intimate Partner Violence: Not on file   Housing Stability: Not on file     Social History     Social History Narrative     Not on file     She lives in [place], [place] with her 2 kids (16 year old  twins). She is currently working. 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  Smoking: denies. Alcohol: denies. Street drugs: denies.     Review of Systems:  ROS   The 14 system ROS was reviewed from the intake questionnaire; results listed at end of note.    Physical Exam:  /83   Pulse 87   SpO2 98%     Physical Exam   Constitutional: She is oriented to person, place, and time.  She appears well-developed and well-nourished. She is not in acute distress.   HENT:     Head: Normocephalic and atraumatic.     Eyes: Pupils are equal, round, and reactive to light. EOM are normal. No scleral icterus.     Neck: Normal range of motion. Neck supple.   Cardiovascular: Normal rate and regular rhythm.   Pulmonary/Chest:  NWOB. No respiratory distress.   Abdominal: Generalized moderate TTP, more pronounced in upper quadrants, rebound negative  Genitourinary: deferred  Neurological: She is alert and oriented to person, place, and time. CN II-XII grossly intact, coordination grossly normal.  Skin: Skin is warm and dry. She is not diaphoretic.   Psychiatric: She has a normal mood and affect. Her behavior is normal. Judgment and thought content normal.  MSK: Gait is normal.  TTP diffusely in right QL region, paraspinal region to lateral abdomen, inferior costal margin to iliac crest with focal hypersensitivity around the scar at her prior drain site    Imaging:  Narrative & Impression   EXAMINATION: CT ABDOMEN W CONTRAST, 10/21/2021 2:14 PM     INDICATION: Acute biliary pancreatitis with infected necrosis     COMPARISON STUDY: CT 9/8/2021     TECHNIQUE: CT scan of the abdomen and pelvis was performed on  multidetector CT scanner using volumetric acquisition technique and  images were reconstructed in multiple planes with variable thickness  and reviewed on dedicated workstations.      CONTRAST: iopamidol (ISOVUE-370) solution 119 mL injected IV without  oral contrast     CT scan radiation dose is optimized to  minimum requisite dose using  automated dose modulation techniques.     FINDINGS:     Lower thorax: Pacemaker leads are partially imaged in the right heart.     Liver: Multiple hepatic cysts are unchanged measuring up to 1.5 cm. No  suspicious hepatic mass.  Focal fat deposition along the falciform  ligament.     Biliary System: Gallbladder surgically absent. No intra or  extrahepatic biliary dilation.     Spleen: Enlarged, measuring 14.6 cm in craniocaudal dimension.     Pancreas: No mass or pancreatic ductal dilation. There is persistent  mild stranding around the pancreas.     Adrenal glands: No mass or nodules     Kidneys: No suspicious mass, obstructing calculus or hydronephrosis.     Gastrointestinal tract :Normal appendix. Normal caliber small bowel.   Interval removal of right flank surgical drain. A 1.4 x 1.0 cm  peripherally enhancing thick-walled fluid collection in the right  abdomen medial to the ascending colon on series 3 image 194 previously  measured 2.7 x 1.7 cm. A 1.5 x 0.9 cm collection anterior to the IVC  on image 127 previously measured 1.9 x 1.4 cm.     Retroperitoneum: Patent major abdominal vasculature. No significant  lymphadenopathy.     Pelvis: Urinary bladder is normal.  Intrauterine device within the  uterus. No adnexal masses. Left ovarian cyst.     Osseous structures: No aggressive or acute osseous lesion.      Soft tissues: Within normal limits.                                                                      IMPRESSION:  1. Interval removal of right flank surgical drainage catheter. Small  residual fluid collections have decreased in size from 9/8/2021, as  described. No new or enlarging focal fluid collections.  2. Splenomegaly.     Laboratory Results:  No recent relevant laboratory results available    Assessment:    The patient is a 41 year old female with PMHx of anxiety, panic disorder, and acute gallstone pancreatitis s/p cholecystectomy c/b necrotizing pancreatitis who  was referred by Dr. Aguiar for evaluation of back and abdominal pain.  Based on patient's report, her primary pain is the right-sided low back which upon examination localizes to the quadratus lumborum musculature primarily with associated tenderness palpation and hypersensitivity in the area of the scar from her prior percutaneous drain.  She does endorse mild abdominal pain with some associated tenderness to palpation, however this is not the primary component of her pain.  We discussed multiple treatment options to address her primary pain complaint as well as to address and reduce the risk of future hypersensitivity and central and peripheral sensitization.  She will likely benefit from multidisciplinary treatment of right quadratus lumborum pain, however she fails to make significant improvement additional treatments can be considered including trigger point injections, alternative pharmacotherapy, and pain psychology.  If her abdominal pain worsens or becomes her primary source of pain, celiac plexus block could be considered.    Visit Diagnoses:  1. Myofascial pain  2. Chronic pancreatitis  3. Chronic abdominal pain    Plan:  Patient education:    We discussed with the patient the likely mechanisms underlying her pain.  In her case, this includes myofascial pain secondary to percutaneous tube and abdominal pain secondary to chronic pancreatitis which are likely contributing to her overall pain picture.  In addition we discussed the role of central and peripheral pain processing in the development and propagation of chronic pain as a disorder as well as the importance of multidisciplinary treatment and multimodal medication therapy.    Work up:    None at this time    Referrals:    Nutrition referral placed to evaluate for additional modifications to the patient's diet to minimize exacerbation of chronic pancreatitis    Medications:    Trial amitriptyline 25 mg at bedtime for myofascial and chronic abdominal  pain.  Will reassess for benefit, side effects, and additional titration at next evaluation.  o If not beneficial consider trial of duloxetine    Trial methocarbamol 750 mg QID PRN for myofascial pain.  Will reassess for efficacy and side effects at next evaluation.  Consider metaxalone as alternative therapy if non beneficial    Therapies:    Referral placed for physical therapy for evaluation and treatment of right-sided quadratus lumborum myofascial pain    Consider pain psychology referral if no significant improvement with initial treatment regimen    Interventions:    Deferred at this time  o Trigger point injections can be considered if the patient's pain is not responsive to pharmacotherapy and physical therapy  o Celiac plexus block can be considered if abdominal pain becomes more severe in the future    Follow up: 6-8 weeks or sooner if needed    Thank you for the consult.    Tyrone Byrd MD    Department of Anesthesiology  Pain Management Division

## 2021-12-09 ENCOUNTER — OFFICE VISIT (OUTPATIENT)
Dept: ANESTHESIOLOGY | Facility: CLINIC | Age: 41
End: 2021-12-09
Payer: COMMERCIAL

## 2021-12-09 VITALS — SYSTOLIC BLOOD PRESSURE: 119 MMHG | OXYGEN SATURATION: 98 % | HEART RATE: 87 BPM | DIASTOLIC BLOOD PRESSURE: 83 MMHG

## 2021-12-09 DIAGNOSIS — M79.18 MYOFASCIAL PAIN: Primary | ICD-10-CM

## 2021-12-09 DIAGNOSIS — K86.1 CHRONIC PANCREATITIS, UNSPECIFIED PANCREATITIS TYPE (H): ICD-10-CM

## 2021-12-09 DIAGNOSIS — K85.12 ACUTE BILIARY PANCREATITIS WITH INFECTED NECROSIS: ICD-10-CM

## 2021-12-09 PROCEDURE — 99204 OFFICE O/P NEW MOD 45 MIN: CPT | Performed by: ANESTHESIOLOGY

## 2021-12-09 RX ORDER — METHOCARBAMOL 750 MG/1
750 TABLET, FILM COATED ORAL 4 TIMES DAILY PRN
Qty: 90 TABLET | Refills: 0 | Status: SHIPPED | OUTPATIENT
Start: 2021-12-09 | End: 2022-01-04

## 2021-12-09 ASSESSMENT — PAIN SCALES - GENERAL: PAINLEVEL: MODERATE PAIN (5)

## 2021-12-09 NOTE — PATIENT INSTRUCTIONS
Medications:    amitriptyline (ELAVIL) 25 MG tablet- Take 1 tablet (25 mg) by mouth At Bedtime.    methocarbamol (ROBAXIN) 750 MG tablet- Take 1 tablet (750 mg) by mouth 4 times daily as needed for muscle spasms    Please provide the clinic with a minium of 1 week notice, on all prescription refills.     Referrals:    Nutrition Referral placed.     Physical Therapy Referral Placed.     Recommended Follow up:      6-8 weeks     To speak with a nurse, schedule/reschedule/cancel a clinic appointment, or request a medication refill call: [phone], option #1.    You can also reach us by [employer]: [email or web address]

## 2021-12-09 NOTE — LETTER
12/9/2021       RE: Alexandra Nunez  [address] [place] [address]     Dear Colleague,    Thank you for referring your patient, Alexandra Nunez, to the [facility] FOR COMPREHENSIVE PAIN MANAGEMENT [place] at [facility]. Please see a copy of my visit note below.    VISIT RECOMMENDATIONS:  1. Trial methocarbamol 750 mg QID PRN for myofascial pain.  Will reassess for efficacy and side effects at next evaluation.  Consider metaxalone as alternative therapy if non beneficial  2. Trial amitriptyline 25 mg at bedtime for myofascial and chronic abdominal pain.  Will reassess for benefit, side effects, and additional titration at next evaluation.  3. Referral placed for Nutrition for evaluation and recommendations regarding dietary constraints in the setting of chronic pancreatitis  4. Physical therapy referral placed for right QL pain s/p drain placement with subsequent myofascial pain    COMPREHENSIVE PAIN CLINIC INITIAL EVALUATION    I had the pleasure of meeting Ms. Alexandra Nunez on 12/9/2021 in the Chronic Pain Clinic in consult for Dr. Aguiar with regards to her chronic back and abdominal pain.    Subjective:  The patient is a 41 year old female with past medical history of anxiety, panic disorder, and acute gallstone pancreatitis s/p cholecystectomy c/b necrotizing pancreatitis who presents for evaluation of back and abdominal pain.  The patient's pain began at the end of April following an acute episode of gallstone pancreatitis and has been fluctuating since that time.  She had a percutaneous drain in and her symptoms have improved marginally since removal and remain fluctuant.  She reports that her pain is located primarily in the right low back and radiates to the left shoulder.  The patient describes the pain as jabbing and constant.  She reports that the pain is made worse by certain foods.  Her pain is improved with ibuprofen and tramadol.   In addition, she reports nausea, abdominal pain, and rare hot flashes associated with the pain.  The patient's pain does not have a significant temporal component and varies from day to day, but flares of pain tend to persist for an entire day.  She rates her average pain score at 5/10, but it can be as low as 3/10 or as severe as 7/10.     She denies any new problems with falls or balance, any new numbness or weakness of the arms or legs, any new bowel or bladder incontinence, any night sweats or unexplained fevers, or any sudden or unexpected weight loss.     Current treatments:  Ibuprofen  Tramadol    Previous medication treatments included:  Anti-convulsants: Gabapentin remotely for seizures, side effects  Muscle relaxors: not tried  Anti-depressants: Uncertain  Acetaminophen/NSAIDs: Ibuprofen, beneficial  Topicals: Lidocaine, mild temporary benefit  Opioids: Tramadol PRN    Other treatments have included:  Physical therapy: not tried  Pain Psychology: not tried  Chiropractic: beneficial for other pain  Acupuncture: not tried  TENs Unit: not tried  Injections: not tried  Surgeries: Per HPI    Past Medical History:  Medical history reviewed.   Necrotizing pancreatitis 07/15/2021   Gallstone pancreatitis 04/25/2021   Acute gallstone pancreatitis 04/25/2021   Overview:     Formatting of this note might be different from the original.  Added automatically from request for surgery [id number]     Atypical squamous cells of undetermined significance (ASCUS) on Papanicolaou smear of cervix 02/20/2018   Overview:     Formatting of this note might be different from the original.  From visit on 2/14/18: History of abnormal pap tests? No  2013 NILM  2018 HSIL , Positive for High Risk HPV types other than 16 or 18 37 y.o.  3-1-18 [place]: UTE III  3-23-18 LEEP: UTE III. Margins not clear.   12/2019 ASCUS, HPV High Risk Other Than 16/18 detected 39 y.o.  Plan: [place]     Cardiac pacemaker in situ 11/05/2014   Pacemaker at end of  battery life 10/11/2013   Mild intermittent asthma without complication 06/07/2013   Eating disorder 04/13/2010   Overview:     Formatting of this note might be different from the original.  Epic     Generalized anxiety disorder 04/13/2010   Nontoxic multinodular goiter 02/08/2010   Allergic rhinitis 04/22/2009   Overview:     Formatting of this note might be different from the original.  Epic     Insomnia 04/22/2009   Overview:     Formatting of this note might be different from the original.  Epic     Telogen effluvium 03/10/2009   Anxiety state 02/11/2009   Panic disorder without agoraphobia 02/10/2009   Convulsions 12/03/2008   Overview:     Formatting of this note might be different from the original.  Formatting of this note might be different from the original.  Epic       Pacemaker for vasovagal syndrome     Past Surgical History:  Pertinent surgical history reviewed.   PACEMAKER PLACEMENT          LAPAROSCOPY, SURGICAL; CHOLECYSTECTOMY WITH CHOLANGIOGRAPHY 4/28/2021 Abdomen/N/A Procedure: Laparoscopic Cholecystectomy, Cholangiogram; Surgeon: Mauricio Byrd MD; Location: [facility]; Service: General Surgery Trauma     XR ERCP FAUSTINA AND PANCREAS ROUTINE        Medications: Pertinent medications reviewed and updated  Current Outpatient Medications   Medication Sig Dispense Refill     albuterol (PROAIR HFA/PROVENTIL HFA/VENTOLIN HFA) 108 (90 Base) MCG/ACT inhaler Inhale 1-2 puffs into the lungs every 4 hours as needed for shortness of breath / dyspnea or wheezing       albuterol (PROVENTIL) (2.5 MG/3ML) 0.083% neb solution Take 2.5 mg by nebulization every 4 hours as needed for shortness of breath / dyspnea or wheezing       ALPRAZolam (XANAX) 0.5 MG tablet Take 0.5 mg by mouth 2 times daily as needed for anxiety       fluticasone (FLOVENT HFA) 110 MCG/ACT inhaler Inhale 2 puffs into the lungs 2 times daily       ibuprofen (ADVIL/MOTRIN) 100 MG/5ML suspension Take 600 mg by mouth every 6 hours  as needed for fever or moderate pain       levonorgestrel (MIRENA) 20 MCG/24HR IUD 1 each by Intrauterine route once       ondansetron (ZOFRAN) 4 MG tablet Take by mouth every 6 hours as needed for nausea       sodium chloride, PF, (SALINE FLUSH) 0.9% PF flush 3 mLs by Intracatheter route every 8 hours       traMADol (ULTRAM) 50 MG tablet Take 1 tablet (50 mg) by mouth 2 times daily as needed for severe pain 20 tablet 0     Vitamin D3 (CHOLECALCIFEROL) 25 mcg (1000 units) tablet Take 1 tablet by mouth daily       [place] and [place] Prescription Monitoring Program reviewed     Allergies: Pertinent allergies reviewed     Allergies   Allergen Reactions     Nkda [No Known Drug Allergies]      Family History:     Medical History Relation Name Comments   Myocardial Infarction Birth Father        Anxiety Birth Mother        Cancer, Colon Maternal Aunt        Cancer, Other Maternal Grandfather    Oral cancer that metastasized to the brain.     Relation Name Status Comments   Birth Father    Alive     Birth Mother    Alive     Brother    Alive     Daughter    Alive     Maternal Aunt          Maternal Grandfather     Metastatic oral cancer.   Maternal Grandmother         Paternal Grandfather         Paternal Grandmother         Son    Alive      Social history:   Social History     Socioeconomic History     Marital status: Single     Spouse name: Not on file     Number of children: Not on file     Years of education: Not on file     Highest education level: Not on file   Occupational History     Not on file   Tobacco Use     Smoking status: Never Smoker     Smokeless tobacco: Never Used   Substance and Sexual Activity     Alcohol use: Not Currently     Drug use: Not Currently     Sexual activity: Not on file   Other Topics Concern     Not on file   Social History Narrative     Not on file     Social Determinants of Health     Financial Resource Strain: Not on file   Food Insecurity: Not on file    Transportation Needs: Not on file   Physical Activity: Not on file   Stress: Not on file   Social Connections: Not on file   Intimate Partner Violence: Not on file   Housing Stability: Not on file     Social History     Social History Narrative     Not on file     She lives in [place], [place] with her 2 kids (16 year old twins). She is currently working. 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  Smoking: denies. Alcohol: denies. Street drugs: denies.     Review of Systems:  ROS   The 14 system ROS was reviewed from the intake questionnaire; results listed at end of note.    Physical Exam:  /83   Pulse 87   SpO2 98%     Physical Exam   Constitutional: She is oriented to person, place, and time.  She appears well-developed and well-nourished. She is not in acute distress.   HENT:     Head: Normocephalic and atraumatic.     Eyes: Pupils are equal, round, and reactive to light. EOM are normal. No scleral icterus.     Neck: Normal range of motion. Neck supple.   Cardiovascular: Normal rate and regular rhythm.   Pulmonary/Chest:  NWOB. No respiratory distress.   Abdominal: Generalized moderate TTP, more pronounced in upper quadrants, rebound negative  Genitourinary: deferred  Neurological: She is alert and oriented to person, place, and time. CN II-XII grossly intact, coordination grossly normal.  Skin: Skin is warm and dry. She is not diaphoretic.   Psychiatric: She has a normal mood and affect. Her behavior is normal. Judgment and thought content normal.  MSK: Gait is normal.  TTP diffusely in right QL region, paraspinal region to lateral abdomen, inferior costal margin to iliac crest with focal hypersensitivity around the scar at her prior drain site    Imaging:  Narrative & Impression   EXAMINATION: CT ABDOMEN W CONTRAST, 10/21/2021 2:14 PM     INDICATION: Acute biliary pancreatitis with infected necrosis     COMPARISON STUDY: CT 9/8/2021     TECHNIQUE: CT scan of the abdomen and pelvis was performed  on  multidetector CT scanner using volumetric acquisition technique and  images were reconstructed in multiple planes with variable thickness  and reviewed on dedicated workstations.      CONTRAST: iopamidol (ISOVUE-370) solution 119 mL injected IV without  oral contrast     CT scan radiation dose is optimized to minimum requisite dose using  automated dose modulation techniques.     FINDINGS:     Lower thorax: Pacemaker leads are partially imaged in the right heart.     Liver: Multiple hepatic cysts are unchanged measuring up to 1.5 cm. No  suspicious hepatic mass.  Focal fat deposition along the falciform  ligament.     Biliary System: Gallbladder surgically absent. No intra or  extrahepatic biliary dilation.     Spleen: Enlarged, measuring 14.6 cm in craniocaudal dimension.     Pancreas: No mass or pancreatic ductal dilation. There is persistent  mild stranding around the pancreas.     Adrenal glands: No mass or nodules     Kidneys: No suspicious mass, obstructing calculus or hydronephrosis.     Gastrointestinal tract :Normal appendix. Normal caliber small bowel.   Interval removal of right flank surgical drain. A 1.4 x 1.0 cm  peripherally enhancing thick-walled fluid collection in the right  abdomen medial to the ascending colon on series 3 image 194 previously  measured 2.7 x 1.7 cm. A 1.5 x 0.9 cm collection anterior to the IVC  on image 127 previously measured 1.9 x 1.4 cm.     Retroperitoneum: Patent major abdominal vasculature. No significant  lymphadenopathy.     Pelvis: Urinary bladder is normal.  Intrauterine device within the  uterus. No adnexal masses. Left ovarian cyst.     Osseous structures: No aggressive or acute osseous lesion.      Soft tissues: Within normal limits.                                                                      IMPRESSION:  1. Interval removal of right flank surgical drainage catheter. Small  residual fluid collections have decreased in size from 9/8/2021, as  described.  No new or enlarging focal fluid collections.  2. Splenomegaly.     Laboratory Results:  No recent relevant laboratory results available    Assessment:    The patient is a 41 year old female with PMHx of anxiety, panic disorder, and acute gallstone pancreatitis s/p cholecystectomy c/b necrotizing pancreatitis who was referred by Dr. Aguiar for evaluation of back and abdominal pain.  Based on patient's report, her primary pain is the right-sided low back which upon examination localizes to the quadratus lumborum musculature primarily with associated tenderness palpation and hypersensitivity in the area of the scar from her prior percutaneous drain.  She does endorse mild abdominal pain with some associated tenderness to palpation, however this is not the primary component of her pain.  We discussed multiple treatment options to address her primary pain complaint as well as to address and reduce the risk of future hypersensitivity and central and peripheral sensitization.  She will likely benefit from multidisciplinary treatment of right quadratus lumborum pain, however she fails to make significant improvement additional treatments can be considered including trigger point injections, alternative pharmacotherapy, and pain psychology.  If her abdominal pain worsens or becomes her primary source of pain, celiac plexus block could be considered.    Visit Diagnoses:  1. Myofascial pain  2. Chronic pancreatitis  3. Chronic abdominal pain    Plan:  Patient education:    We discussed with the patient the likely mechanisms underlying her pain.  In her case, this includes myofascial pain secondary to percutaneous tube and abdominal pain secondary to chronic pancreatitis which are likely contributing to her overall pain picture.  In addition we discussed the role of central and peripheral pain processing in the development and propagation of chronic pain as a disorder as well as the importance of multidisciplinary treatment  and multimodal medication therapy.    Work up:    None at this time    Referrals:    Nutrition referral placed to evaluate for additional modifications to the patient's diet to minimize exacerbation of chronic pancreatitis    Medications:    Trial amitriptyline 25 mg at bedtime for myofascial and chronic abdominal pain.  Will reassess for benefit, side effects, and additional titration at next evaluation.  o If not beneficial consider trial of duloxetine    Trial methocarbamol 750 mg QID PRN for myofascial pain.  Will reassess for efficacy and side effects at next evaluation.  Consider metaxalone as alternative therapy if non beneficial    Therapies:    Referral placed for physical therapy for evaluation and treatment of right-sided quadratus lumborum myofascial pain    Consider pain psychology referral if no significant improvement with initial treatment regimen    Interventions:    Deferred at this time  o Trigger point injections can be considered if the patient's pain is not responsive to pharmacotherapy and physical therapy  o Celiac plexus block can be considered if abdominal pain becomes more severe in the future    Follow up: 6-8 weeks or sooner if needed    Thank you for the consult.    Tyrone Byrd MD    Department of Anesthesiology  Pain Management Division      Again, thank you for allowing me to participate in the care of your patient.      Sincerely,    Tyrone Byrd MD

## 2021-12-09 NOTE — NURSING NOTE
Patient presents with:  Consult: UMP NEW, rm 14, patient reports, 5/10 pain in her mid back      Moderate Pain (5)     Pain Medications     Opioid Agonists Refills Start End     traMADol (ULTRAM) 50 MG tablet    0 9/17/2021     Sig - Route: Take 1 tablet (50 mg) by mouth 2 times daily as needed for severe pain - Oral    Class: No Print Out    Notes to Pharmacy: Verbal order to [facility] on file          What medications are you using for pain?   Tramadol, ibuprofen,     (New patients only) Have you been seen by another pain clinic/ provider? N/A      Migue Ferrara, EMT

## 2021-12-13 ENCOUNTER — MYC MEDICAL ADVICE (OUTPATIENT)
Dept: GASTROENTEROLOGY | Facility: CLINIC | Age: 41
End: 2021-12-13
Payer: COMMERCIAL

## 2021-12-15 DIAGNOSIS — K86.1 CHRONIC PANCREATITIS, UNSPECIFIED PANCREATITIS TYPE (H): Primary | ICD-10-CM

## 2021-12-16 ENCOUNTER — LAB (OUTPATIENT)
Dept: LAB | Facility: CLINIC | Age: 41
End: 2021-12-16
Payer: COMMERCIAL

## 2021-12-16 ENCOUNTER — ANCILLARY PROCEDURE (OUTPATIENT)
Dept: CT IMAGING | Facility: CLINIC | Age: 41
End: 2021-12-16
Attending: INTERNAL MEDICINE
Payer: COMMERCIAL

## 2021-12-16 DIAGNOSIS — K86.1 CHRONIC PANCREATITIS, UNSPECIFIED PANCREATITIS TYPE (H): ICD-10-CM

## 2021-12-16 LAB
ALBUMIN SERPL-MCNC: 4.1 G/DL (ref 3.4–5)
ALP SERPL-CCNC: 52 U/L (ref 40–150)
ALT SERPL W P-5'-P-CCNC: 18 U/L (ref 0–50)
AMYLASE SERPL-CCNC: 45 U/L (ref 30–110)
ANION GAP SERPL CALCULATED.3IONS-SCNC: 7 MMOL/L (ref 3–14)
AST SERPL W P-5'-P-CCNC: 12 U/L (ref 0–45)
BILIRUB SERPL-MCNC: 1.3 MG/DL (ref 0.2–1.3)
BUN SERPL-MCNC: 8 MG/DL (ref 7–30)
CALCIUM SERPL-MCNC: 8.9 MG/DL (ref 8.5–10.1)
CHLORIDE BLD-SCNC: 108 MMOL/L (ref 94–109)
CO2 SERPL-SCNC: 24 MMOL/L (ref 20–32)
CREAT SERPL-MCNC: 0.49 MG/DL (ref 0.52–1.04)
ERYTHROCYTE [DISTWIDTH] IN BLOOD BY AUTOMATED COUNT: 12.7 % (ref 10–15)
GFR SERPL CREATININE-BSD FRML MDRD: >90 ML/MIN/1.73M2
GLUCOSE BLD-MCNC: 127 MG/DL (ref 70–99)
HCT VFR BLD AUTO: 35.6 % (ref 35–47)
HGB BLD-MCNC: 12 G/DL (ref 11.7–15.7)
LIPASE SERPL-CCNC: 125 U/L (ref 73–393)
MCH RBC QN AUTO: 29.8 PG (ref 26.5–33)
MCHC RBC AUTO-ENTMCNC: 33.7 G/DL (ref 31.5–36.5)
MCV RBC AUTO: 88 FL (ref 78–100)
PLATELET # BLD AUTO: 261 10E3/UL (ref 150–450)
POTASSIUM BLD-SCNC: 3.8 MMOL/L (ref 3.4–5.3)
PROT SERPL-MCNC: 7.3 G/DL (ref 6.8–8.8)
RBC # BLD AUTO: 4.03 10E6/UL (ref 3.8–5.2)
SODIUM SERPL-SCNC: 139 MMOL/L (ref 133–144)
WBC # BLD AUTO: 5.2 10E3/UL (ref 4–11)

## 2021-12-16 PROCEDURE — 36415 COLL VENOUS BLD VENIPUNCTURE: CPT

## 2021-12-16 PROCEDURE — 85027 COMPLETE CBC AUTOMATED: CPT

## 2021-12-16 PROCEDURE — 82150 ASSAY OF AMYLASE: CPT

## 2021-12-16 PROCEDURE — 83690 ASSAY OF LIPASE: CPT

## 2021-12-16 PROCEDURE — 80053 COMPREHEN METABOLIC PANEL: CPT

## 2021-12-16 PROCEDURE — 74160 CT ABDOMEN W/CONTRAST: CPT | Performed by: RADIOLOGY

## 2021-12-16 RX ORDER — IOPAMIDOL 755 MG/ML
119 INJECTION, SOLUTION INTRAVASCULAR ONCE
Status: COMPLETED | OUTPATIENT
Start: 2021-12-16 | End: 2021-12-16

## 2021-12-16 RX ADMIN — IOPAMIDOL 119 ML: 755 INJECTION, SOLUTION INTRAVASCULAR at 18:09

## 2022-01-04 ENCOUNTER — MYC REFILL (OUTPATIENT)
Dept: ANESTHESIOLOGY | Facility: CLINIC | Age: 42
End: 2022-01-04
Payer: COMMERCIAL

## 2022-01-04 DIAGNOSIS — M79.18 MYOFASCIAL PAIN: ICD-10-CM

## 2022-01-05 ENCOUNTER — VIRTUAL VISIT (OUTPATIENT)
Dept: GASTROENTEROLOGY | Facility: CLINIC | Age: 42
End: 2022-01-05
Payer: COMMERCIAL

## 2022-01-05 DIAGNOSIS — K85.12 ACUTE BILIARY PANCREATITIS WITH INFECTED NECROSIS: Primary | ICD-10-CM

## 2022-01-05 PROCEDURE — 99213 OFFICE O/P EST LOW 20 MIN: CPT | Mod: 95 | Performed by: INTERNAL MEDICINE

## 2022-01-05 RX ORDER — METHOCARBAMOL 750 MG/1
750 TABLET, FILM COATED ORAL 4 TIMES DAILY PRN
Qty: 90 TABLET | Refills: 1 | Status: SHIPPED | OUTPATIENT
Start: 2022-01-05 | End: 2023-05-03

## 2022-01-05 NOTE — PATIENT INSTRUCTIONS
Follow up as needed.      Please call with any questions or concerns regarding your clinic visit today.    It is a pleasure being involved in your health care.    Contacts post-consultation depending on your need:    Schedule Clinic Appointments            [phone] # 1   M-F 7:30 - 5 pm    Jessica Damon RN Care Coordinator  [phone]    Geoff Cook LPN    [phone]     OR Procedure Scheduling                             [phone]    My Chart is available 24 hours a day and is a secure way to access your records and communicate with your care team.  I strongly recommend signing up if you haven't already done so, if you are comfortable with computers.  If you would like to inquire about this or are having problems with My Chart access, you may call [phone] or go online at [email or web address].  Please allow at least 24 hours for a response and extra time on weekends and Holidays.

## 2022-01-05 NOTE — TELEPHONE ENCOUNTER
LPN spoke to the provider, they were agreeable to refill the pt's Methocarbamol for for 1 month, before the pt has an appointment with the provider on 2/16/22 with Dr. Boyd.     Angela Davila LPN

## 2022-01-05 NOTE — PROGRESS NOTES
Alexandra is a 41 year old who is being evaluated via a billable video visit.      How would you like to obtain your AVS? MyChart  If the video visit is dropped, the invitation should be resent by: Text to cell phone: [phone]  Will anyone else be joining your video visit? No    Video Start Time: 1:41 PM  Video-Visit Details    Type of service:  Video Visit    Start: 01/05/2022 01:29 pm  Stop: 01/05/2022 01:55 pm    Originating Location (pt. Location): Home    Distant Location (provider location):  [facility] [place]     Platform used for Video Visit: [employer]     {Follow-up persistent low back (R) and LUQ pain. Post drain removal of perc drain for necrotizing pancreatitis after ERCP April for CBD stone - that was straightforward and day after cholecystectomy.     We spent 25 minutes discussing that there is no specific GI diagnosis. Best to focus on holistic and pain management approach. Via our pain clinic and chiropracter.    Follow-up prn.        Narrative & Impression   EXAMINATION: CT ABDOMEN W CONTRAST, 10/21/2021 2:14 PM     INDICATION: Acute biliary pancreatitis with infected necrosis     COMPARISON STUDY: CT 9/8/2021     TECHNIQUE: CT scan of the abdomen and pelvis was performed on  multidetector CT scanner using volumetric acquisition technique and  images were reconstructed in multiple planes with variable thickness  and reviewed on dedicated workstations.      CONTRAST: iopamidol (ISOVUE-370) solution 119 mL injected IV without  oral contrast     CT scan radiation dose is optimized to minimum requisite dose using  automated dose modulation techniques.     FINDINGS:     Lower thorax: Pacemaker leads are partially imaged in the right heart.     Liver: Multiple hepatic cysts are unchanged measuring up to 1.5 cm. No  suspicious hepatic mass.  Focal fat deposition along the falciform  ligament.     Biliary System: Gallbladder surgically absent. No intra or  extrahepatic  biliary dilation.     Spleen: Enlarged, measuring 14.6 cm in craniocaudal dimension.     Pancreas: No mass or pancreatic ductal dilation. There is persistent  mild stranding around the pancreas.     Adrenal glands: No mass or nodules     Kidneys: No suspicious mass, obstructing calculus or hydronephrosis.     Gastrointestinal tract :Normal appendix. Normal caliber small bowel.   Interval removal of right flank surgical drain. A 1.4 x 1.0 cm  peripherally enhancing thick-walled fluid collection in the right  abdomen medial to the ascending colon on series 3 image 194 previously  measured 2.7 x 1.7 cm. A 1.5 x 0.9 cm collection anterior to the IVC  on image 127 previously measured 1.9 x 1.4 cm.     Retroperitoneum: Patent major abdominal vasculature. No significant  lymphadenopathy.     Pelvis: Urinary bladder is normal.  Intrauterine device within the  uterus. No adnexal masses. Left ovarian cyst.     Osseous structures: No aggressive or acute osseous lesion.      Soft tissues: Within normal limits.                                                                      IMPRESSION:  1. Interval removal of right flank surgical drainage catheter. Small  residual fluid collections have decreased in size from 9/8/2021, as  described. No new or enlarging focal fluid collections.  2. Splenomegaly.     I have personally reviewed the examination and initial interpretation  and I agree with the findings.     HAYLIE LOPES DO         SYSTEM ID:  [id number]     Follow-up      Study Result    Narrative & Impression   EXAM: CT ABDOMEN W CONTRAST  LOCATION: [facility]  DATE/TIME: 12/16/2021 5:36 PM     INDICATION:  Chronic pancreatitis, unspecified pancreatitis type (H), follow up fluid collections.  COMPARISON: 10/21/2021.  TECHNIQUE: CT scan of the abdomen was performed following injection of IV contrast. Multiplanar reformats were obtained. Dose reduction techniques were used.  CONTRAST: Iopamidol  (ISOVUE-370) solution 119 mL.     FINDINGS:    LOWER CHEST: Normal.     HEPATOBILIARY: Multiple hepatic masses stable, likely benign, though many are too small to characterize definitively. Cholecystectomy. No biliary dilatation.     PANCREAS: Normal.     SPLEEN: Normal.     ADRENAL GLANDS: Normal.     KIDNEYS: Normal.     BOWEL: Resolution of small fluid collections seen on prior with mild residual scarring and soft tissue density. Trace fluid remains adjacent to the right lobe of the liver. No bowel obstruction.     LYMPH NODES: Normal.     VASCULATURE: Unremarkable.     MUSCULOSKELETAL: Tiny fat-containing paraumbilical hernia. Degenerative disease.                                                                      IMPRESSION:   1.  Resolution of fluid collections on prior.  2.  Remainder stable.

## 2022-01-05 NOTE — LETTER
1/5/2022         RE: Alexandra Nunez  [address] [place] [address]        Dear Colleague,    Thank you for referring your patient, Alexandra Nunez, to the [facility] [place]. Please see a copy of my visit note below.        {Follow-up persistent low back (R) and LUQ pain. Post drain removal of perc drain for necrotizing pancreatitis after ERCP April for CBD stone - that was straightforward and day after cholecystectomy.     We spent 25 minutes discussing that there is no specific GI diagnosis. Best to focus on holistic and pain management approach. Via our pain clinic and chiropracter.    Follow-up prn.        Narrative & Impression   EXAMINATION: CT ABDOMEN W CONTRAST, 10/21/2021 2:14 PM     INDICATION: Acute biliary pancreatitis with infected necrosis     COMPARISON STUDY: CT 9/8/2021     TECHNIQUE: CT scan of the abdomen and pelvis was performed on  multidetector CT scanner using volumetric acquisition technique and  images were reconstructed in multiple planes with variable thickness  and reviewed on dedicated workstations.      CONTRAST: iopamidol (ISOVUE-370) solution 119 mL injected IV without  oral contrast     CT scan radiation dose is optimized to minimum requisite dose using  automated dose modulation techniques.     FINDINGS:     Lower thorax: Pacemaker leads are partially imaged in the right heart.     Liver: Multiple hepatic cysts are unchanged measuring up to 1.5 cm. No  suspicious hepatic mass.  Focal fat deposition along the falciform  ligament.     Biliary System: Gallbladder surgically absent. No intra or  extrahepatic biliary dilation.     Spleen: Enlarged, measuring 14.6 cm in craniocaudal dimension.     Pancreas: No mass or pancreatic ductal dilation. There is persistent  mild stranding around the pancreas.     Adrenal glands: No mass or nodules     Kidneys: No suspicious mass, obstructing calculus or hydronephrosis.     Gastrointestinal tract  :Normal appendix. Normal caliber small bowel.   Interval removal of right flank surgical drain. A 1.4 x 1.0 cm  peripherally enhancing thick-walled fluid collection in the right  abdomen medial to the ascending colon on series 3 image 194 previously  measured 2.7 x 1.7 cm. A 1.5 x 0.9 cm collection anterior to the IVC  on image 127 previously measured 1.9 x 1.4 cm.     Retroperitoneum: Patent major abdominal vasculature. No significant  lymphadenopathy.     Pelvis: Urinary bladder is normal.  Intrauterine device within the  uterus. No adnexal masses. Left ovarian cyst.     Osseous structures: No aggressive or acute osseous lesion.      Soft tissues: Within normal limits.                                                                      IMPRESSION:  1. Interval removal of right flank surgical drainage catheter. Small  residual fluid collections have decreased in size from 9/8/2021, as  described. No new or enlarging focal fluid collections.  2. Splenomegaly.     I have personally reviewed the examination and initial interpretation  and I agree with the findings.     HAYLIE LOPES DO         SYSTEM ID:  [id number]     Follow-up      Study Result    Narrative & Impression   EXAM: CT ABDOMEN W CONTRAST  LOCATION: [facility]  DATE/TIME: 12/16/2021 5:36 PM     INDICATION:  Chronic pancreatitis, unspecified pancreatitis type (H), follow up fluid collections.  COMPARISON: 10/21/2021.  TECHNIQUE: CT scan of the abdomen was performed following injection of IV contrast. Multiplanar reformats were obtained. Dose reduction techniques were used.  CONTRAST: Iopamidol (ISOVUE-370) solution 119 mL.     FINDINGS:    LOWER CHEST: Normal.     HEPATOBILIARY: Multiple hepatic masses stable, likely benign, though many are too small to characterize definitively. Cholecystectomy. No biliary dilatation.     PANCREAS: Normal.     SPLEEN: Normal.     ADRENAL GLANDS: Normal.     KIDNEYS: Normal.     BOWEL:  Resolution of small fluid collections seen on prior with mild residual scarring and soft tissue density. Trace fluid remains adjacent to the right lobe of the liver. No bowel obstruction.     LYMPH NODES: Normal.     VASCULATURE: Unremarkable.     MUSCULOSKELETAL: Tiny fat-containing paraumbilical hernia. Degenerative disease.                                                                      IMPRESSION:   1.  Resolution of fluid collections on prior.  2.  Remainder stable.       Again, thank you for allowing me to participate in the care of your patient.        Sincerely,        Johny Aguiar MD

## 2022-02-16 ENCOUNTER — VIRTUAL VISIT (OUTPATIENT)
Dept: ANESTHESIOLOGY | Facility: CLINIC | Age: 42
End: 2022-02-16
Payer: COMMERCIAL

## 2022-02-16 DIAGNOSIS — R10.9 CHRONIC ABDOMINAL PAIN: Primary | ICD-10-CM

## 2022-02-16 DIAGNOSIS — K86.1 CHRONIC PANCREATITIS, UNSPECIFIED PANCREATITIS TYPE (H): ICD-10-CM

## 2022-02-16 DIAGNOSIS — G89.29 CHRONIC ABDOMINAL PAIN: Primary | ICD-10-CM

## 2022-02-16 PROCEDURE — 99215 OFFICE O/P EST HI 40 MIN: CPT | Mod: 95 | Performed by: ANESTHESIOLOGY

## 2022-02-16 RX ORDER — TIZANIDINE 2 MG/1
2 TABLET ORAL 3 TIMES DAILY PRN
Qty: 90 TABLET | Refills: 1 | Status: SHIPPED | OUTPATIENT
Start: 2022-02-16 | End: 2022-05-26

## 2022-02-16 NOTE — NURSING NOTE
Patient presents with:  RECHECK: P RETURN,Patient reports, 4/10 abdomen, back pain      Data Unavailable     Pain Medications     Opioid Agonists Refills Start End     traMADol (ULTRAM) 50 MG tablet    0 9/17/2021     Sig - Route: Take 1 tablet (50 mg) by mouth 2 times daily as needed for severe pain - Oral    Class: No Print Out    Notes to Pharmacy: Verbal order to [facility] on file          What medications are you using for pain?   Amitriptyline, robaxin  (Return Patients only) What refills are you needing today? Amitriptyline, robaxin         How will you be connecting to the visit? mychart  How would you like to obtain your AVS? MyChart  If the video visit is dropped, the invitation should be resent by: Text to cell phone: [phone]   Will anyone else be joining your video visit? Mayra Ferrara, EMT

## 2022-02-16 NOTE — LETTER
"2/16/2022       RE: Alexandra Nunez  [address] [place] [address]     Dear Colleague,    Thank you for referring your patient, Alexandra Nunez, to the [facility] [place] at [facility]. Please see a copy of my visit note below.                          [facility] Consultation    Date of visit: 2/16/2022    Reason for consultation:    Alexandra Nunez is a 41 year old female who is seen in consultation today at the request of her provider, Dr Aguiar.    Primary Care Provider is [facility], [facility].    Please see the [facility] health questionnaire which the patient completed and reviewed with me in detail.    Chief Complaint:    Chief Complaint   Patient presents with     RECHECK     [facility] RETURN,Patient reports, 4/10 abdomen, back pain       Pain history:  Alexandra Nunez is a 41 year old female who first started having problems with pain in her abdomen. She was diagnosed with necrotizing pancreatitis and underwent feeding tube placement. The feeding tube has since been removed. She now continues to have pain in the front and back of her stomach as well as new onset pain under the left ribcage. She has been taking methocarbamol (4 tabs daily) but notes this is not that helpful and is just a \"bandage\" for the pain.  She has also been taking amitriptyline 25 mg and thinks this is helping a little bit. She has not had any other flares since the initial necrotizing pancreatitis. Her drain was removed in September, but is still recovering from the pain due to the prior feeding tube.     She notes that the pain essentially the same as two months ago.  She has not had any injections at this time.      Pain rating: Averages 4/10 on a 0-10 scale.  Aggravating factors include: movement, standing on feet  Relieving factors include: sitting and relaxing  Any bowel or bladder incontinence: " constipation    Current treatments include:    Methocarbamol 750 mg QID PRN  Amitriptyline 25 mg at bedtime    Previous medication treatments included:    Tramadol    Other treatments have included:  Alexandra Nunez has been seen at a pain clinic in the past.  Was previously seen by Dr Hilton  PT: no, referral placed   Acupuncture: no, not yet, may be interested in the future  TENs Unit: no  Injections: no    Past Medical History:  No past medical history on file.  Patient Active Problem List    Diagnosis Date Noted     Pancreatitis 06/14/2021     Priority: Medium       Past Surgical History:  Past Surgical History:   Procedure Laterality Date     IR SINOGRAM INJECTION DIAGNOSTIC  7/12/2021     IR SINOGRAM INJECTION DIAGNOSTIC  7/22/2021     IR SINOGRAM INJECTION DIAGNOSTIC  8/12/2021     IR SINOGRAM INJECTION DIAGNOSTIC  8/25/2021     IR SINOGRAM INJECTION DIAGNOSTIC  9/22/2021     IR SINOGRAM INJECTION DIAGNOSTIC  8/19/2021     IR SINOGRAM INJECTION THERAPEUTIC  8/5/2021     IR SINOGRAM INJECTION THERAPEUTIC  9/8/2021     Medications:  Current Outpatient Medications   Medication Sig Dispense Refill     albuterol (PROAIR HFA/PROVENTIL HFA/VENTOLIN HFA) 108 (90 Base) MCG/ACT inhaler Inhale 1-2 puffs into the lungs every 4 hours as needed for shortness of breath / dyspnea or wheezing       albuterol (PROVENTIL) (2.5 MG/3ML) 0.083% neb solution Take 2.5 mg by nebulization every 4 hours as needed for shortness of breath / dyspnea or wheezing       ALPRAZolam (XANAX) 0.5 MG tablet Take 0.5 mg by mouth 2 times daily as needed for anxiety       fluticasone (FLOVENT HFA) 110 MCG/ACT inhaler Inhale 2 puffs into the lungs 2 times daily       ibuprofen (ADVIL/MOTRIN) 100 MG/5ML suspension Take 600 mg by mouth every 6 hours as needed for fever or moderate pain       levonorgestrel (MIRENA) 20 MCG/24HR IUD 1 each by Intrauterine route once       methocarbamol (ROBAXIN) 750 MG tablet Take 1 tablet (750 mg) by mouth 4 times  daily as needed for muscle spasms 90 tablet 1     ondansetron (ZOFRAN) 4 MG tablet Take by mouth every 6 hours as needed for nausea       sodium chloride, PF, (SALINE FLUSH) 0.9% PF flush 3 mLs by Intracatheter route every 8 hours       traMADol (ULTRAM) 50 MG tablet Take 1 tablet (50 mg) by mouth 2 times daily as needed for severe pain 20 tablet 0     Vitamin D3 (CHOLECALCIFEROL) 25 mcg (1000 units) tablet Take 1 tablet by mouth daily       amitriptyline (ELAVIL) 25 MG tablet Take 1 tablet (25 mg) by mouth At Bedtime 30 tablet 1     Allergies:     Allergies   Allergen Reactions     Nkda [No Known Drug Allergies]      Social History:  Home situation: Twin children 16 years old  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      Review of Systems:    POSTIVE IN BOLD  GENERAL: fever/chills, fatigue, general unwell feeling, weight gain/loss.  HEAD/EYES:  headache, dizziness, or vision changes.    EARS/NOSE/THROAT:  Nosebleeds, hearing loss, sinus infection, earache, tinnitus.  IMMUNE:  Allergies, cancer, immune deficiency, or infections.  SKIN:  Urticaria, rash, hives  HEME/Lymphatic:   anemia, easy bruising, easy bleeding.  RESPIRATORY:  cough, wheezing, or shortness of breath  CARDIOVASCULAR/Circulation:  Pacemaker in place for hx of vasovagal episodes in youth Extremity edema, syncope, hypertension, tachycardia, or angina.  GASTROINTESTINAL:  abdominal pain, nausea/emesis, diarrhea, constipation,  hematochezia, or melena.  ENDOCRINE:  Diabetes, steroid use,  thyroid disease or osteoporosis.  MUSCULOSKELETAL: neck pain, back pain, arthralgia, arthritis, or gout.  GENITOURINARY:  frequency, urgency, dysuria, difficulty voiding, hematuria or incontinence.  NEUROLOGIC:  weakness, numbness, paresthesias, seizure, tremor, stroke  "or memory loss.  PSYCHIATRIC:  depression, anxiety, stress, suicidal thoughts or mood swings.     Physical Exam: VIRTUAL VISIT  There were no vitals filed for this visit.  Exam:  Constitutional: healthy, alert and no distress  Head: normocephalic. Atraumatic.   Eyes: no redness or jaundice noted via videocamera   Respiratory: non-labored breathing with conversation   Skin: no suspicious lesions or rashes  Psychiatric: mentation appears normal and affect normal/bright    Musculoskeletal exam:  Gait/Station/Posture: normal via video    Neurologic exam:  CN:  Cranial nerves 2-12 are normal    Diagnostic tests:  CT of abdomen was completed on 12/16/2021 showing:  \"IMPRESSION:   1.  Resolution of fluid collections on prior.  2.  Remainder stable.\"    Personally reviewed imaging on day of visit with the patient    Other testing (labs, diagnostics) reviewed:  Labs  Last Comprehensive Metabolic Panel:  Sodium   Date Value Ref Range Status   12/16/2021 139 133 - 144 mmol/L Final   06/23/2021 133 133 - 144 mmol/L Final     Potassium   Date Value Ref Range Status   12/16/2021 3.8 3.4 - 5.3 mmol/L Final   06/23/2021 4.7 3.4 - 5.3 mmol/L Final     Chloride   Date Value Ref Range Status   12/16/2021 108 94 - 109 mmol/L Final   06/23/2021 99 94 - 109 mmol/L Final     Carbon Dioxide   Date Value Ref Range Status   06/23/2021 28 20 - 32 mmol/L Final     Carbon Dioxide (CO2)   Date Value Ref Range Status   12/16/2021 24 20 - 32 mmol/L Final     Anion Gap   Date Value Ref Range Status   12/16/2021 7 3 - 14 mmol/L Final   06/23/2021 6 3 - 14 mmol/L Final     Glucose   Date Value Ref Range Status   12/16/2021 127 (H) 70 - 99 mg/dL Final   06/23/2021 108 (H) 70 - 99 mg/dL Final     Urea Nitrogen   Date Value Ref Range Status   12/16/2021 8 7 - 30 mg/dL Final   06/23/2021 17 7 - 30 mg/dL Final     Creatinine   Date Value Ref Range Status   12/16/2021 0.49 (L) 0.52 - 1.04 mg/dL Final   06/23/2021 0.61 0.52 - 1.04 mg/dL Final     GFR " Estimate   Date Value Ref Range Status   12/16/2021 >90 >60 mL/min/1.73m2 Final     Comment:     As of July 11, 2021, eGFR is calculated by the CKD-EPI creatinine equation, without race adjustment. eGFR can be influenced by muscle mass, exercise, and diet. The reported eGFR is an estimation only and is only applicable if the renal function is stable.   06/23/2021 >90 >60 mL/min/[1.73_m2] Final     Comment:     Non  GFR Calc  Starting 12/18/2018, serum creatinine based estimated GFR (eGFR) will be   calculated using the Chronic Kidney Disease Epidemiology Collaboration   (CKD-EPI) equation.       Calcium   Date Value Ref Range Status   12/16/2021 8.9 8.5 - 10.1 mg/dL Final   06/23/2021 9.5 8.5 - 10.1 mg/dL Final     [place] Prescription Monitoring Program reviewed     Outside records reviewed      Assessment:  1. Necrotizing Pancreatitis    Alexandra Nunez is a 41 year old female who presents with the complaints of chronic abdominal pain due to necrotizing pancreatitis that occurred several months ago. We discussed treatment options and agreed to the following plan.     Plan:  Diagnosis reviewed, treatment option addressed, and risk/benefits discussed.  Self-care instructions given.  I am recommending a multidisciplinary treatment plan to help this patient better manage her pain.      1. Physical Therapy: continue daily HEPs  2. Pain Psychologist to address issues of relaxation, behavioral change, coping style, and other factors important to improvement: Referral Placed  3. Diagnostic Studies: none  4. Medication Management: refill of Amitriptyline provided; stop methocarbamol and trial Tizanidine 2 mg TID PRN  5. Rx for TENS unit  6. Further procedures recommended: Celiac Plexus Block  7. Follow up: 6 to 8 weeks after procedure    Total time spent was 50 minutes, and more than 50% of face to face time was spent in counseling and/or coordination of care regarding principles of multidisciplinary care,  medication management, and therapeutic options. This time also includes time for chart review, preparation, and documentation.       Carla Boyd MD    Pain Medicine  Department of Anesthesiology  [facility]

## 2022-02-16 NOTE — PROGRESS NOTES
"                      [facility] Consultation    Date of visit: 2/16/2022    Reason for consultation:    Alexandra Nunze is a 41 year old female who is seen in consultation today at the request of her provider, Dr Aguiar.    Primary Care Provider is Chela De Jesus.    Please see the [facility] health questionnaire which the patient completed and reviewed with me in detail.    Chief Complaint:    Chief Complaint   Patient presents with     RECHECK     UMP RETURN,Patient reports, 4/10 abdomen, back pain       Pain history:  Alexandra Nunez is a 41 year old female who first started having problems with pain in her abdomen. She was diagnosed with necrotizing pancreatitis and underwent feeding tube placement. The feeding tube has since been removed. She now continues to have pain in the front and back of her stomach as well as new onset pain under the left ribcage. She has been taking methocarbamol (4 tabs daily) but notes this is not that helpful and is just a \"bandage\" for the pain.  She has also been taking amitriptyline 25 mg and thinks this is helping a little bit. She has not had any other flares since the initial necrotizing pancreatitis. Her drain was removed in September, but is still recovering from the pain due to the prior feeding tube.     She notes that the pain essentially the same as two months ago.  She has not had any injections at this time.      Pain rating: Averages 4/10 on a 0-10 scale.  Aggravating factors include: movement, standing on feet  Relieving factors include: sitting and relaxing  Any bowel or bladder incontinence: constipation    Current treatments include:    Methocarbamol 750 mg QID PRN  Amitriptyline 25 mg at bedtime    Previous medication treatments included:    Tramadol    Other treatments have included:  Alexandra Nunez has been seen at a pain clinic in the past.  Was previously seen by Dr Hilton  PT: no, referral placed   Acupuncture: " no, not yet, may be interested in the future  TENs Unit: no  Injections: no    Past Medical History:  No past medical history on file.  Patient Active Problem List    Diagnosis Date Noted     Pancreatitis 06/14/2021     Priority: Medium       Past Surgical History:  Past Surgical History:   Procedure Laterality Date     IR SINOGRAM INJECTION DIAGNOSTIC  7/12/2021     IR SINOGRAM INJECTION DIAGNOSTIC  7/22/2021     IR SINOGRAM INJECTION DIAGNOSTIC  8/12/2021     IR SINOGRAM INJECTION DIAGNOSTIC  8/25/2021     IR SINOGRAM INJECTION DIAGNOSTIC  9/22/2021     IR SINOGRAM INJECTION DIAGNOSTIC  8/19/2021     IR SINOGRAM INJECTION THERAPEUTIC  8/5/2021     IR SINOGRAM INJECTION THERAPEUTIC  9/8/2021     Medications:  Current Outpatient Medications   Medication Sig Dispense Refill     albuterol (PROAIR HFA/PROVENTIL HFA/VENTOLIN HFA) 108 (90 Base) MCG/ACT inhaler Inhale 1-2 puffs into the lungs every 4 hours as needed for shortness of breath / dyspnea or wheezing       albuterol (PROVENTIL) (2.5 MG/3ML) 0.083% neb solution Take 2.5 mg by nebulization every 4 hours as needed for shortness of breath / dyspnea or wheezing       ALPRAZolam (XANAX) 0.5 MG tablet Take 0.5 mg by mouth 2 times daily as needed for anxiety       fluticasone (FLOVENT HFA) 110 MCG/ACT inhaler Inhale 2 puffs into the lungs 2 times daily       ibuprofen (ADVIL/MOTRIN) 100 MG/5ML suspension Take 600 mg by mouth every 6 hours as needed for fever or moderate pain       levonorgestrel (MIRENA) 20 MCG/24HR IUD 1 each by Intrauterine route once       methocarbamol (ROBAXIN) 750 MG tablet Take 1 tablet (750 mg) by mouth 4 times daily as needed for muscle spasms 90 tablet 1     ondansetron (ZOFRAN) 4 MG tablet Take by mouth every 6 hours as needed for nausea       sodium chloride, PF, (SALINE FLUSH) 0.9% PF flush 3 mLs by Intracatheter route every 8 hours       traMADol (ULTRAM) 50 MG tablet Take 1 tablet (50 mg) by mouth 2 times daily as needed for severe pain  20 tablet 0     Vitamin D3 (CHOLECALCIFEROL) 25 mcg (1000 units) tablet Take 1 tablet by mouth daily       amitriptyline (ELAVIL) 25 MG tablet Take 1 tablet (25 mg) by mouth At Bedtime 30 tablet 1     Allergies:     Allergies   Allergen Reactions     Nkda [No Known Drug Allergies]      Social History:  Home situation: Twin children 16 years old  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      Review of Systems:    POSTIVE IN BOLD  GENERAL: fever/chills, fatigue, general unwell feeling, weight gain/loss.  HEAD/EYES:  headache, dizziness, or vision changes.    EARS/NOSE/THROAT:  Nosebleeds, hearing loss, sinus infection, earache, tinnitus.  IMMUNE:  Allergies, cancer, immune deficiency, or infections.  SKIN:  Urticaria, rash, hives  HEME/Lymphatic:   anemia, easy bruising, easy bleeding.  RESPIRATORY:  cough, wheezing, or shortness of breath  CARDIOVASCULAR/Circulation:  Pacemaker in place for hx of vasovagal episodes in youth Extremity edema, syncope, hypertension, tachycardia, or angina.  GASTROINTESTINAL:  abdominal pain, nausea/emesis, diarrhea, constipation,  hematochezia, or melena.  ENDOCRINE:  Diabetes, steroid use,  thyroid disease or osteoporosis.  MUSCULOSKELETAL: neck pain, back pain, arthralgia, arthritis, or gout.  GENITOURINARY:  frequency, urgency, dysuria, difficulty voiding, hematuria or incontinence.  NEUROLOGIC:  weakness, numbness, paresthesias, seizure, tremor, stroke or memory loss.  PSYCHIATRIC:  depression, anxiety, stress, suicidal thoughts or mood swings.     Physical Exam: VIRTUAL VISIT  There were no vitals filed for this visit.  Exam:  Constitutional: healthy, alert and no distress  Head: normocephalic. Atraumatic.   Eyes: no redness or jaundice noted via videocamera   Respiratory:  "non-labored breathing with conversation   Skin: no suspicious lesions or rashes  Psychiatric: mentation appears normal and affect normal/bright    Musculoskeletal exam:  Gait/Station/Posture: normal via video    Neurologic exam:  CN:  Cranial nerves 2-12 are normal    Diagnostic tests:  CT of abdomen was completed on 12/16/2021 showing:  \"IMPRESSION:   1.  Resolution of fluid collections on prior.  2.  Remainder stable.\"    Personally reviewed imaging on day of visit with the patient    Other testing (labs, diagnostics) reviewed:  Labs  Last Comprehensive Metabolic Panel:  Sodium   Date Value Ref Range Status   12/16/2021 139 133 - 144 mmol/L Final   06/23/2021 133 133 - 144 mmol/L Final     Potassium   Date Value Ref Range Status   12/16/2021 3.8 3.4 - 5.3 mmol/L Final   06/23/2021 4.7 3.4 - 5.3 mmol/L Final     Chloride   Date Value Ref Range Status   12/16/2021 108 94 - 109 mmol/L Final   06/23/2021 99 94 - 109 mmol/L Final     Carbon Dioxide   Date Value Ref Range Status   06/23/2021 28 20 - 32 mmol/L Final     Carbon Dioxide (CO2)   Date Value Ref Range Status   12/16/2021 24 20 - 32 mmol/L Final     Anion Gap   Date Value Ref Range Status   12/16/2021 7 3 - 14 mmol/L Final   06/23/2021 6 3 - 14 mmol/L Final     Glucose   Date Value Ref Range Status   12/16/2021 127 (H) 70 - 99 mg/dL Final   06/23/2021 108 (H) 70 - 99 mg/dL Final     Urea Nitrogen   Date Value Ref Range Status   12/16/2021 8 7 - 30 mg/dL Final   06/23/2021 17 7 - 30 mg/dL Final     Creatinine   Date Value Ref Range Status   12/16/2021 0.49 (L) 0.52 - 1.04 mg/dL Final   06/23/2021 0.61 0.52 - 1.04 mg/dL Final     GFR Estimate   Date Value Ref Range Status   12/16/2021 >90 >60 mL/min/1.73m2 Final     Comment:     As of July 11, 2021, eGFR is calculated by the CKD-EPI creatinine equation, without race adjustment. eGFR can be influenced by muscle mass, exercise, and diet. The reported eGFR is an estimation only and is only applicable if the renal " function is stable.   06/23/2021 >90 >60 mL/min/[1.73_m2] Final     Comment:     Non  GFR Calc  Starting 12/18/2018, serum creatinine based estimated GFR (eGFR) will be   calculated using the Chronic Kidney Disease Epidemiology Collaboration   (CKD-EPI) equation.       Calcium   Date Value Ref Range Status   12/16/2021 8.9 8.5 - 10.1 mg/dL Final   06/23/2021 9.5 8.5 - 10.1 mg/dL Final     [place] Prescription Monitoring Program reviewed     Outside records reviewed      Assessment:  1. Necrotizing Pancreatitis    Alexandra Nunez is a 41 year old female who presents with the complaints of chronic abdominal pain due to necrotizing pancreatitis that occurred several months ago. We discussed treatment options and agreed to the following plan.     Plan:  Diagnosis reviewed, treatment option addressed, and risk/benefits discussed.  Self-care instructions given.  I am recommending a multidisciplinary treatment plan to help this patient better manage her pain.      1. Physical Therapy: continue daily HEPs  2. Pain Psychologist to address issues of relaxation, behavioral change, coping style, and other factors important to improvement: Referral Placed  3. Diagnostic Studies: none  4. Medication Management: refill of Amitriptyline provided; stop methocarbamol and trial Tizanidine 2 mg TID PRN  5. Rx for TENS unit  6. Further procedures recommended: Celiac Plexus Block  7. Follow up: 6 to 8 weeks after procedure    Total time spent was 50 minutes, and more than 50% of face to face time was spent in counseling and/or coordination of care regarding principles of multidisciplinary care, medication management, and therapeutic options. This time also includes time for chart review, preparation, and documentation.     Carla Boyd MD    Pain Medicine  Department of Anesthesiology  [facility]

## 2022-02-17 ENCOUNTER — TELEPHONE (OUTPATIENT)
Dept: ANESTHESIOLOGY | Facility: CLINIC | Age: 42
End: 2022-02-17
Payer: COMMERCIAL

## 2022-02-17 PROBLEM — R10.9 CHRONIC ABDOMINAL PAIN: Status: ACTIVE | Noted: 2022-02-17

## 2022-02-17 PROBLEM — G89.29 CHRONIC ABDOMINAL PAIN: Status: ACTIVE | Noted: 2022-02-17

## 2022-02-18 ENCOUNTER — TELEPHONE (OUTPATIENT)
Dept: ANESTHESIOLOGY | Facility: CLINIC | Age: 42
End: 2022-02-18
Payer: COMMERCIAL

## 2022-02-18 DIAGNOSIS — Z11.59 ENCOUNTER FOR SCREENING FOR OTHER VIRAL DISEASES: Primary | ICD-10-CM

## 2022-02-18 NOTE — TELEPHONE ENCOUNTER
Patient is scheduled for procedure with Dr. Boyd    Spoke with: Patient    Date of Procedure: 03-18-22    Location: [facility] – [place], [place]    Informed patient they will need an adult  Yes    Pre-procedure COVID-19 Test: Pt getting this completed on 03-15-22 @ [facility]    Additional comments: N/A    Patient is aware pre-op RN will call 2-3 days prior to procedure with arrival time and instructions. Yes      Tiffanie Martinez on 2/18/2022 at 1:37 PM

## 2022-02-21 ENCOUNTER — TELEPHONE (OUTPATIENT)
Dept: ANESTHESIOLOGY | Facility: CLINIC | Age: 42
End: 2022-02-21
Payer: COMMERCIAL

## 2022-03-18 ENCOUNTER — ANCILLARY PROCEDURE (OUTPATIENT)
Dept: RADIOLOGY | Facility: AMBULATORY SURGERY CENTER | Age: 42
End: 2022-03-18
Attending: ANESTHESIOLOGY
Payer: COMMERCIAL

## 2022-03-18 ENCOUNTER — HOSPITAL ENCOUNTER (OUTPATIENT)
Facility: AMBULATORY SURGERY CENTER | Age: 42
Discharge: HOME | End: 2022-03-18
Attending: ANESTHESIOLOGY | Admitting: ANESTHESIOLOGY
Payer: COMMERCIAL

## 2022-03-18 VITALS
TEMPERATURE: 97.1 F | RESPIRATION RATE: 16 BRPM | BODY MASS INDEX: 23.03 KG/M2 | OXYGEN SATURATION: 100 % | SYSTOLIC BLOOD PRESSURE: 103 MMHG | HEART RATE: 66 BPM | HEIGHT: 72 IN | DIASTOLIC BLOOD PRESSURE: 69 MMHG | WEIGHT: 170 LBS

## 2022-03-18 DIAGNOSIS — G89.29 CHRONIC ABDOMINAL PAIN: ICD-10-CM

## 2022-03-18 DIAGNOSIS — R10.9 CHRONIC ABDOMINAL PAIN: ICD-10-CM

## 2022-03-18 LAB
HCG UR QL: NEGATIVE
INTERNAL QC OK POCT: NORMAL
POCT KIT EXPIRATION DATE: NORMAL
POCT KIT LOT NUMBER: 1234

## 2022-03-18 PROCEDURE — 64530 N BLOCK INJ CELIAC PELUS: CPT

## 2022-03-18 PROCEDURE — 81025 URINE PREGNANCY TEST: CPT | Performed by: PATHOLOGY

## 2022-03-18 RX ORDER — BUPIVACAINE HYDROCHLORIDE AND EPINEPHRINE 2.5; 5 MG/ML; UG/ML
INJECTION, SOLUTION INFILTRATION; PERINEURAL PRN
Status: DISCONTINUED | OUTPATIENT
Start: 2022-03-18 | End: 2022-03-18 | Stop reason: HOSPADM

## 2022-03-18 RX ORDER — IOPAMIDOL 408 MG/ML
INJECTION, SOLUTION INTRATHECAL PRN
Status: DISCONTINUED | OUTPATIENT
Start: 2022-03-18 | End: 2022-03-18 | Stop reason: HOSPADM

## 2022-03-18 RX ORDER — ONDANSETRON 2 MG/ML
4 INJECTION INTRAMUSCULAR; INTRAVENOUS ONCE
Status: COMPLETED | OUTPATIENT
Start: 2022-03-18 | End: 2022-03-18

## 2022-03-18 RX ORDER — BUPIVACAINE HYDROCHLORIDE 2.5 MG/ML
INJECTION, SOLUTION EPIDURAL; INFILTRATION; INTRACAUDAL PRN
Status: DISCONTINUED | OUTPATIENT
Start: 2022-03-18 | End: 2022-03-18 | Stop reason: HOSPADM

## 2022-03-18 RX ORDER — LIDOCAINE HYDROCHLORIDE 10 MG/ML
INJECTION, SOLUTION EPIDURAL; INFILTRATION; INTRACAUDAL; PERINEURAL PRN
Status: DISCONTINUED | OUTPATIENT
Start: 2022-03-18 | End: 2022-03-18 | Stop reason: HOSPADM

## 2022-03-18 RX ORDER — DEXAMETHASONE SODIUM PHOSPHATE 10 MG/ML
INJECTION, SOLUTION INTRAMUSCULAR; INTRAVENOUS PRN
Status: DISCONTINUED | OUTPATIENT
Start: 2022-03-18 | End: 2022-03-18 | Stop reason: HOSPADM

## 2022-03-18 RX ADMIN — ONDANSETRON 4 MG: 2 INJECTION INTRAMUSCULAR; INTRAVENOUS at 13:54

## 2022-03-18 NOTE — DISCHARGE INSTRUCTIONS
Home Care Instructions after a Celiac Plexus Block      Celiac plexus blocks are injections of pain medication that help relieve abdominal pain.  After the procedure, your abdomen may feel warm or  different,  and you may begin to feel less abdominal pain. Your abdominal wall or legs may feel numb or weak, but this feeling will subside as the medication wears off.    Activity  -You may resume most normal activity levels with the exception of strenuous activity. It is important for us to know if your pain with normal activity is relieved after this injection.  -DO NOT shower for 24 hours  -DO NOT remove bandaid for 24 hours      Pain  -You may experience soreness at the injection site for one or two days  -You may use an ice pack for 20 minutes every 2 hours for the first 24 hours  -You may use a heating pad after the first 24 hours  -You may use Tylenol (acetaminophen) every 4 hours or other pain medicines as     directed by your physician      DID YOU RECEIVE SEDATION TODAY?    If you received sedation please follow these additional safety measures.  Sedation medicine, if given, may remain active for many hours. It is important for the next 24 hours that you do not:  -Drive a car  -Operate machines or power tools  -Consume alcohol, including beer  -Sign any important papers or legal documents    DID YOU RECEIVE STEROIDS TODAY?    Common side effects of steroids:  Not everyone will experience corticosteroid side effects. If side effects are experienced, they will gradually subside in the 7-10 day period following an injection. Most common side effects include:  -Flushed face and/or chest  -Feeling of warmth, particularly in the face but could be an overall feeling of warmth  -Increased blood sugar in diabetic patients  -Menstrual irregularities my occur. If taking hormone-based birth control an alternate method of birth control is recommended  -Sleep disturbances and/or mood swings are possible  -Leg  cramps      PLEASE KEEP TRACK OF YOUR SYMPTOMS AND NOTE YOUR IMPROVEMENT FOR YOUR DOCTOR.     Please contact us if you have:  -Severe pain  -Fever more than 101.5 degrees Fahrenheit  -Signs of infection at the injection site (redness, swelling, or drainage)    If you have questions, please contact our office at [phone] between the hours of 7:00 am and 3:00 pm Monday through Friday. After office hours you can contact the on call provider by dialing [phone]. If you need immediate attention, we recommend that you go to a hospital emergency room or dial [phone].

## 2022-03-23 NOTE — PROCEDURES
Patient: Alexandra Nunez Age: 41 year old   MRN: [id number] Attending: Dr. Boyd     Date of Visit: March 18, 2022      PAIN MEDICINE CLINIC PROCEDURE NOTE    ATTENDING CLINICIAN:    Carla Boyd MD    ASSISTANT CLINICIAN:    PREPROCEDURE DIAGNOSES:  Chronic Abdominal Pain      POSTPROCEDURE DIAGNOSES:  Chronic Abdominal Pain    PROCEDURE(S) PERFORMED:  1.  Bilateral Celiac plexus block   2.  Fluoroscopic guidance for the above-named procedure(s)      ANESTHESIA:  Local.    BLOOD LOSS:  Minimal.    DRAINS AND SPECIMENS:  None.    COMPLICATIONS:  None.    INDICATIONS:  Alexandra Nunez is a 41 year old female with a history chronic abdominal pain.  The patient stated that he was in usual state of health and denied recent anticoagulant use or recent infections.  Therefore, the plan is to perform above mentioned procedure.     Procedure Details:     The patient was met in the procedure room, where the patient was identified by name, medical record number and date of birth. All of the patient s last minute questions were answered. Written informed consent was obtained and saved in the electronic medical record, after the risks, benefits, and alternatives were discussed with the patient.      A formal time-out procedure was performed, as per protocol, including patient name, title of procedure, and site of procedure, and all in the room concurred. Routine monitors were applied.      The patient was placed in the prone position on the procedure room table. All pressure points were checked and comfortably padded. Routine monitors were placed. Vital signs were stable.     A chlorhexidine prep was completed followed by sterile draping per standard procedure. An intravenous IV catheter was placed and the patient was given a bolus of 500 mL of normal saline.      The anatomical target site was determined using fluoroscopy by squaring off the superior endplate of L1, and then ipsilaterally obliquing the C-arm intensifier  until the tip of the L1 transverse process was at the anterolateral border of the L1 vertebral body. Target was made over the right superior portion of the vertebral body of L1. Local anesthetic was given by raising a skin wheal and going down to the hub of the 25-gauge 1.25-inch needle. A 22-gauge 5-inch Quincke needle was then advanced intermittently with close contact of vertebral at all times. Multiple AP and lateral fluoroscopy images were taken to make sure that the needle is always in close proximity of vertebral body. The needle tip was advanced until it was just ventral to the anterior border of the body of L1. After negative aspiration, injection of  2 cc magnevist contrast under live fluoroscopy demonstrated excellent prevertebral spread.     Next, after negative aspiration, 2 mL of bupivacaine with epinephrine was injected and heart rate was monitored for 90 seconds without any variability. 5 mL of 0.25% bupivacaine and 0.5 ml of dexamethasone (5mg) were slowly injected with appropriate dissipation of contrast shown in washout images.     The procedure was then repeated on the contralateral side.     Condition:    The patient remained awake and alert throughout the procedure.  The patient tolerated the procedure well and was monitored for approximately 15 minutes afterward in the post procedure area.  There were no immediate post procedure complications noted.  The patient was then discharged to home as per protocol.  The patient will follow up in the outpatient clinic in 4 week(s), unless otherwise clinically indicated.    Pre-procedure pain score: 5/10  Pos-procedure pain score: 0/10    Carla Boyd MD    Pain Medicine  Department of Anesthesiology  [facility]

## 2022-03-23 NOTE — H&P
ABBREVIATED H&P [facility]      Patient Name: Alexandra Nunez   MRN: [id number]   YOB: 1980     1. Reason for Procedure:  Procedure Summary     Date: 03/18/22 Room / Location: [facility] – [place] PROCEDURE ROOM 06 / [facility]    Anesthesia Start:  Anesthesia Stop:     Procedure: Celiac Plexus Block (Bilateral Back) Diagnosis:       Chronic abdominal pain      (Chronic abdominal pain [R10.9, G89.29])    Providers: Carla Boyd MD Responsible Provider:     Anesthesia Type: Not recorded ASA Status: Not recorded          2. History:   Past Medical History:   Diagnosis Date     Pacemaker        Comorbidities: None    Any history of sleep apnea? No    Any history of problems with sedation? No    3. Physical:    General: Normal  Skin:  Normal.  Respiratory: Clear to auscultation bilateral, no wheezing  Cardio:  Regular rate and rhythm  Abdomen: Soft, nontender, nondistended, no palpable masses.  Musculoskeletal: Normal  Neuro: Sensory exam normal, motor exam 5/5, bilateral upper and lower extremities       4. Current Medications (if not in Epic):   Current Outpatient Medications   Medication Sig Dispense Refill     albuterol (PROAIR HFA/PROVENTIL HFA/VENTOLIN HFA) 108 (90 Base) MCG/ACT inhaler Inhale 1-2 puffs into the lungs every 4 hours as needed for shortness of breath / dyspnea or wheezing       albuterol (PROVENTIL) (2.5 MG/3ML) 0.083% neb solution Take 2.5 mg by nebulization every 4 hours as needed for shortness of breath / dyspnea or wheezing       ALPRAZolam (XANAX) 0.5 MG tablet Take 0.5 mg by mouth 2 times daily as needed for anxiety       amitriptyline (ELAVIL) 25 MG tablet Take 1 tablet (25 mg) by mouth At Bedtime 30 tablet 2     ibuprofen (ADVIL/MOTRIN) 100 MG/5ML suspension Take 600 mg by mouth every 6 hours as needed for fever or moderate pain       levonorgestrel (MIRENA) 20 MCG/24HR IUD 1 each by Intrauterine route once        methocarbamol (ROBAXIN) 750 MG tablet Take 1 tablet (750 mg) by mouth 4 times daily as needed for muscle spasms 90 tablet 1     ondansetron (ZOFRAN) 4 MG tablet Take by mouth every 6 hours as needed for nausea       tiZANidine (ZANAFLEX) 2 MG tablet Take 1 tablet (2 mg) by mouth 3 times daily as needed for muscle spasms 90 tablet 1     traMADol (ULTRAM) 50 MG tablet Take 1 tablet (50 mg) by mouth 2 times daily as needed for severe pain 20 tablet 0     Vitamin D3 (CHOLECALCIFEROL) 25 mcg (1000 units) tablet Take 1 tablet by mouth daily       fluticasone (FLOVENT HFA) 110 MCG/ACT inhaler Inhale 2 puffs into the lungs 2 times daily       sodium chloride, PF, (SALINE FLUSH) 0.9% PF flush 3 mLs by Intracatheter route every 8 hours          5. Allergies and Reactions:  is allergic to nkda [no known drug allergies].

## 2022-05-25 ENCOUNTER — TELEPHONE (OUTPATIENT)
Dept: PALLIATIVE MEDICINE | Facility: CLINIC | Age: 42
End: 2022-05-25
Payer: COMMERCIAL

## 2022-05-25 DIAGNOSIS — K86.1 CHRONIC PANCREATITIS, UNSPECIFIED PANCREATITIS TYPE (H): ICD-10-CM

## 2022-05-25 DIAGNOSIS — R10.9 CHRONIC ABDOMINAL PAIN: ICD-10-CM

## 2022-05-25 DIAGNOSIS — G89.29 CHRONIC ABDOMINAL PAIN: ICD-10-CM

## 2022-05-25 NOTE — TELEPHONE ENCOUNTER
[facility] Call Center    Phone Message    May a detailed message be left on voicemail: yes     Reason for Call: Medication Refill Request    Has the patient contacted the pharmacy for the refill? Yes   Name of medication being requested: amitriptyline (ELAVIL) 25 MG tablet, tiZANidine (ZANAFLEX) 2 MG tablet   Provider who prescribed the medication: Carla Boyd MD  Pharmacy: [facility] - [facility] [address]  Date medication is needed: asap    Patient out of ELAVIL. Patient requesting both meds to be filled.      Action Taken: Message routed to:  [facility] ([facility] Pain    Travel Screening: Not Applicable

## 2022-05-26 RX ORDER — TIZANIDINE 2 MG/1
2 TABLET ORAL 3 TIMES DAILY PRN
Qty: 90 TABLET | Refills: 1 | Status: SHIPPED | OUTPATIENT
Start: 2022-05-26 | End: 2022-06-09

## 2022-05-26 NOTE — CONFIDENTIAL NOTE
Medication refills sent to patient's requested pharmacy. No changes. Patient last seen 2/16/22.      Luna Matrins RN

## 2022-06-09 ENCOUNTER — OFFICE VISIT (OUTPATIENT)
Dept: ANESTHESIOLOGY | Facility: CLINIC | Age: 42
End: 2022-06-09
Payer: COMMERCIAL

## 2022-06-09 VITALS — HEART RATE: 77 BPM | OXYGEN SATURATION: 100 % | DIASTOLIC BLOOD PRESSURE: 79 MMHG | SYSTOLIC BLOOD PRESSURE: 129 MMHG

## 2022-06-09 DIAGNOSIS — R10.9 CHRONIC ABDOMINAL PAIN: ICD-10-CM

## 2022-06-09 DIAGNOSIS — G89.29 CHRONIC ABDOMINAL PAIN: ICD-10-CM

## 2022-06-09 DIAGNOSIS — K86.1 CHRONIC PANCREATITIS, UNSPECIFIED PANCREATITIS TYPE (H): ICD-10-CM

## 2022-06-09 PROCEDURE — 99213 OFFICE O/P EST LOW 20 MIN: CPT | Performed by: ANESTHESIOLOGY

## 2022-06-09 RX ORDER — TIZANIDINE 2 MG/1
2 TABLET ORAL 3 TIMES DAILY PRN
Qty: 90 TABLET | Refills: 1 | Status: SHIPPED | OUTPATIENT
Start: 2022-06-09 | End: 2022-09-13

## 2022-06-09 ASSESSMENT — ENCOUNTER SYMPTOMS
JOINT SWELLING: 1
BLOATING: 1
MUSCLE WEAKNESS: 1
NAUSEA: 0
DIARRHEA: 0
CONSTIPATION: 1
HEARTBURN: 0
VOMITING: 1
NECK PAIN: 1
MUSCLE CRAMPS: 1
ARTHRALGIAS: 1
RECTAL PAIN: 0
BLOOD IN STOOL: 0
ABDOMINAL PAIN: 1
JAUNDICE: 0
BACK PAIN: 1
BOWEL INCONTINENCE: 0
MYALGIAS: 1
STIFFNESS: 1

## 2022-06-09 ASSESSMENT — ANXIETY QUESTIONNAIRES
6. BECOMING EASILY ANNOYED OR IRRITABLE: NOT AT ALL
7. FEELING AFRAID AS IF SOMETHING AWFUL MIGHT HAPPEN: NOT AT ALL
1. FEELING NERVOUS, ANXIOUS, OR ON EDGE: SEVERAL DAYS
7. FEELING AFRAID AS IF SOMETHING AWFUL MIGHT HAPPEN: NOT AT ALL
GAD7 TOTAL SCORE: 5
2. NOT BEING ABLE TO STOP OR CONTROL WORRYING: SEVERAL DAYS
8. IF YOU CHECKED OFF ANY PROBLEMS, HOW DIFFICULT HAVE THESE MADE IT FOR YOU TO DO YOUR WORK, TAKE CARE OF THINGS AT HOME, OR GET ALONG WITH OTHER PEOPLE?: NOT DIFFICULT AT ALL
GAD7 TOTAL SCORE: 5
4. TROUBLE RELAXING: SEVERAL DAYS
5. BEING SO RESTLESS THAT IT IS HARD TO SIT STILL: SEVERAL DAYS
GAD7 TOTAL SCORE: 5
3. WORRYING TOO MUCH ABOUT DIFFERENT THINGS: SEVERAL DAYS

## 2022-06-09 ASSESSMENT — PAIN SCALES - GENERAL: PAINLEVEL: MODERATE PAIN (4)

## 2022-06-09 NOTE — PATIENT INSTRUCTIONS
Medications:    amitriptyline (ELAVIL) 25 MG tablet.  Take 1 tablet (25 mg) by mouth At Bedtime.      tiZANidine (ZANAFLEX) 2 MG tablet. Take 1 tablet (2 mg) by mouth 3 times daily as needed for muscle spasms.        *Please provide the clinic with a minium of 1 week notice, on all prescription refills.       Referrals:      Pain Psychology Referral placed-  Please contact their scheduling office at [phone] to schedule, if you have not heard from them within 2 business days.     Pain psychology Therapies Requested- Biofeedback, Mindfulness training, CBT, Coping and relaxation therapy.        Nutritionist Referral for chronic abdominal pain triggered by food.        Recommended Follow up:      Follow up in 4 months or as needed.         Please call [phone], option #1 to schedule your clinic appointment if you don't already have an appointment scheduled.        To speak with a nurse, schedule/reschedule/cancel a clinic appointment, or request a medication refill call: [phone], option #1.    You can also reach us by [facility]: [email or web address]

## 2022-06-09 NOTE — NURSING NOTE
RN reviewed AVS with patient. Patient to contact clinic if any questions/concerns. Patient verbalized understanding.    Luna Martins RN

## 2022-06-09 NOTE — NURSING NOTE
Patient presents with:  Follow Up: Follow-up RM 8 Abdominal Pain Level 4/10      Moderate Pain (4)     Pain Medications     Opioid Agonists Refills Start End     traMADol (ULTRAM) 50 MG tablet    0 9/17/2021     Sig - Route: Take 1 tablet (50 mg) by mouth 2 times daily as needed for severe pain - Oral    Class: No Print Out    Notes to Pharmacy: Verbal order to [facility] Pharm[facility] on file          What medications are you using for pain?  Tizandine    (New patients only) Have you been seen by another pain clinic/ provider? No    (Return Patients only) What refills are you needing today? No

## 2022-06-09 NOTE — PROGRESS NOTES
"[facility]    Date of visit: 6/9/2022    Chief complaint:   Chief Complaint   Patient presents with     Follow Up     Follow-up RM 8 Abdominal Pain Level 4/10       Interval history:  Alexandra Nunez was last seen by me on 2/16/2022.      Recommendations/plan at the last visit included:  1. Physical Therapy: continue daily HEPs  2. Pain Psychologist to address issues of relaxation, behavioral change, coping style, and other factors important to improvement: Referral Placed  3. Diagnostic Studies: none  4. Medication Management: refill of Amitriptyline provided; stop methocarbamol and trial Tizanidine 2 mg TID PRN  5. Rx for TENS unit  6. Further procedures recommended: Celiac Plexus Block  7. Follow up: 6 to 8 weeks after procedure      Since her last visit, Alexandra Nunez reports:  Celiac plexus block was not helpful, maybe slightly helpful with right flank pain but not abdominal pain. Had vasovagal response and took a while to recover (2 weeks)  Had flair several weeks ago, sharp shooting pain, n/v, \"sweatiness\" - lasted for approximately 2-3 days. Notes triggering by food; eats same food daily, and notes    Currently taking low dose Xanax - takes rarely (max 1x/week)    Pain scores:  Pain intensity on average is 4 on a scale of 0-10.     Current pain treatments:   Amitriptyline 25 mg at bedtime  Tizanidine 2 mg - takes approx 2 to 3 times per days - does take it daily    Past pain treatments:  Tramadol     Side Effects: denies any problems    Medications:  Current Outpatient Medications   Medication Sig Dispense Refill     albuterol (PROAIR HFA/PROVENTIL HFA/VENTOLIN HFA) 108 (90 Base) MCG/ACT inhaler Inhale 1-2 puffs into the lungs every 4 hours as needed for shortness of breath / dyspnea or wheezing       albuterol (PROVENTIL) (2.5 MG/3ML) 0.083% neb solution Take 2.5 mg by nebulization every 4 hours as needed for shortness of breath / dyspnea or wheezing       ALPRAZolam (XANAX) 0.5 MG " tablet Take 0.5 mg by mouth 2 times daily as needed for anxiety       amitriptyline (ELAVIL) 25 MG tablet Take 1 tablet (25 mg) by mouth At Bedtime 30 tablet 1     fluticasone (FLOVENT HFA) 110 MCG/ACT inhaler Inhale 2 puffs into the lungs 2 times daily       ibuprofen (ADVIL/MOTRIN) 100 MG/5ML suspension Take 600 mg by mouth every 6 hours as needed for fever or moderate pain       levonorgestrel (MIRENA) 20 MCG/24HR IUD 1 each by Intrauterine route once       methocarbamol (ROBAXIN) 750 MG tablet Take 1 tablet (750 mg) by mouth 4 times daily as needed for muscle spasms 90 tablet 1     ondansetron (ZOFRAN) 4 MG tablet Take by mouth every 6 hours as needed for nausea       sodium chloride, PF, (SALINE FLUSH) 0.9% PF flush 3 mLs by Intracatheter route every 8 hours       tiZANidine (ZANAFLEX) 2 MG tablet Take 1 tablet (2 mg) by mouth 3 times daily as needed for muscle spasms 90 tablet 1     traMADol (ULTRAM) 50 MG tablet Take 1 tablet (50 mg) by mouth 2 times daily as needed for severe pain 20 tablet 0     Vitamin D3 (CHOLECALCIFEROL) 25 mcg (1000 units) tablet Take 1 tablet by mouth daily         Medical History: any changes in medical history since they were last seen? No    Review of Systems:  The 14 system ROS was reviewed from the intake questionnaire, and is positive for: abdominal pain  Any bowel or bladder problems: denies  Mood: stable    Physical Exam:  Blood pressure 129/79, pulse 77, SpO2 100 %.  General: AAOx3, NAD  Gait: Normal  MSK exam: deferred for conversation    Assessment:   1. Necrotizing Pancreatitis      Alexandra Nunez is a 41 year old female who is seen at the pain clinic for follow up after undergoing celiac plexus block on 3/18/2022. Unfortunately she did not find relief from the procedure. She will continue conservative management with medications (amitriptyline, tizanidine) which are effective at this time. She expressed interest in pain psychology as this is a therapy we have discussed  before as well as in a nutritionist referral.     Plan:  1. Physical Therapy:  Continue current daily HEPs  2. Clinical Health Psychologist to address issues of relaxation, behavioral change, coping style, and other factors important to improvement.  Referral placed  3. ref  4. Diagnostic Studies:  non  5. Medication Management:  Refills provided  6. Further procedures recommended: not at this time  7. Recommendations to PCP: none  8. Follow up: 4 months    Carla Boyd MD    Pain Medicine  Department of Anesthesiology  [facility]            Answers for HPI/ROS submitted by the patient on 6/9/2022  CROW 7 TOTAL SCORE: 5  General Symptoms: No  Skin Symptoms: No  HENT Symptoms: No  EYE SYMPTOMS: No  HEART SYMPTOMS: No  LUNG SYMPTOMS: No  INTESTINAL SYMPTOMS: Yes  URINARY SYMPTOMS: No  GYNECOLOGIC SYMPTOMS: No  BREAST SYMPTOMS: No  SKELETAL SYMPTOMS: Yes  BLOOD SYMPTOMS: No  NERVOUS SYSTEM SYMPTOMS: No  MENTAL HEALTH SYMPTOMS: No  Heart burn or indigestion: No  Nausea: No  Vomiting: Yes  Abdominal pain: Yes  Bloating: Yes  Constipation: Yes  Diarrhea: No  Blood in stool: No  Black stools: No  Rectal or Anal pain: No  Fecal incontinence: No  Yellowing of skin or eyes: No  Vomit with blood: No  Change in stools: No  Back pain: Yes  Muscle aches: Yes  Neck pain: Yes  Swollen joints: Yes  Joint pain: Yes  Bone pain: No  Muscle cramps: Yes  Muscle weakness: Yes  Joint stiffness: Yes  Bone fracture: No

## 2022-06-09 NOTE — LETTER
"6/9/2022       RE: Alexandra Nunez  [address] [place] [id number]     Dear Colleague,    Thank you for referring your patient, Alexandra Nunez, to the [facility] [place] at [facility]. Please see a copy of my visit note below.    [facility]    Date of visit: 6/9/2022    Chief complaint:   Chief Complaint   Patient presents with     Follow Up     Follow-up RM 8 Abdominal Pain Level 4/10       Interval history:  Alexandra Nunez was last seen by me on 2/16/2022.      Recommendations/plan at the last visit included:  1. Physical Therapy: continue daily HEPs  2. Pain Psychologist to address issues of relaxation, behavioral change, coping style, and other factors important to improvement: Referral Placed  3. Diagnostic Studies: none  4. Medication Management: refill of Amitriptyline provided; stop methocarbamol and trial Tizanidine 2 mg TID PRN  5. Rx for TENS unit  6. Further procedures recommended: Celiac Plexus Block  7. Follow up: 6 to 8 weeks after procedure      Since her last visit, Alexandra Nunez reports:  Celiac plexus block was not helpful, maybe slightly helpful with right flank pain but not abdominal pain. Had vasovagal response and took a while to recover (2 weeks)  Had flair several weeks ago, sharp shooting pain, n/v, \"sweatiness\" - lasted for approximately 2-3 days. Notes triggering by food; eats same food daily, and notes    Currently taking low dose Xanax - takes rarely (max 1x/week)    Pain scores:  Pain intensity on average is 4 on a scale of 0-10.     Current pain treatments:   Amitriptyline 25 mg at bedtime  Tizanidine 2 mg - takes approx 2 to 3 times per days - does take it daily    Past pain treatments:  Tramadol     Side Effects: denies any problems    Medications:  Current Outpatient Medications   Medication Sig Dispense Refill     albuterol (PROAIR HFA/PROVENTIL " HFA/VENTOLIN HFA) 108 (90 Base) MCG/ACT inhaler Inhale 1-2 puffs into the lungs every 4 hours as needed for shortness of breath / dyspnea or wheezing       albuterol (PROVENTIL) (2.5 MG/3ML) 0.083% neb solution Take 2.5 mg by nebulization every 4 hours as needed for shortness of breath / dyspnea or wheezing       ALPRAZolam (XANAX) 0.5 MG tablet Take 0.5 mg by mouth 2 times daily as needed for anxiety       amitriptyline (ELAVIL) 25 MG tablet Take 1 tablet (25 mg) by mouth At Bedtime 30 tablet 1     fluticasone (FLOVENT HFA) 110 MCG/ACT inhaler Inhale 2 puffs into the lungs 2 times daily       ibuprofen (ADVIL/MOTRIN) 100 MG/5ML suspension Take 600 mg by mouth every 6 hours as needed for fever or moderate pain       levonorgestrel (MIRENA) 20 MCG/24HR IUD 1 each by Intrauterine route once       methocarbamol (ROBAXIN) 750 MG tablet Take 1 tablet (750 mg) by mouth 4 times daily as needed for muscle spasms 90 tablet 1     ondansetron (ZOFRAN) 4 MG tablet Take by mouth every 6 hours as needed for nausea       sodium chloride, PF, (SALINE FLUSH) 0.9% PF flush 3 mLs by Intracatheter route every 8 hours       tiZANidine (ZANAFLEX) 2 MG tablet Take 1 tablet (2 mg) by mouth 3 times daily as needed for muscle spasms 90 tablet 1     traMADol (ULTRAM) 50 MG tablet Take 1 tablet (50 mg) by mouth 2 times daily as needed for severe pain 20 tablet 0     Vitamin D3 (CHOLECALCIFEROL) 25 mcg (1000 units) tablet Take 1 tablet by mouth daily         Medical History: any changes in medical history since they were last seen? No    Review of Systems:  The 14 system ROS was reviewed from the intake questionnaire, and is positive for: abdominal pain  Any bowel or bladder problems: denies  Mood: stable    Physical Exam:  Blood pressure 129/79, pulse 77, SpO2 100 %.  General: AAOx3, NAD  Gait: Normal  MSK exam: deferred for conversation    Assessment:   1. Necrotizing Pancreatitis      Alexandra Nunez is a 41 year old female who is seen at  the pain clinic for follow up after undergoing celiac plexus block on 3/18/2022. Unfortunately she did not find relief from the procedure. She will continue conservative management with medications (amitriptyline, tizanidine) which are effective at this time. She expressed interest in pain psychology as this is a therapy we have discussed before as well as in a nutritionist referral.     Plan:  1. Physical Therapy:  Continue current daily HEPs  2. Clinical Health Psychologist to address issues of relaxation, behavioral change, coping style, and other factors important to improvement.  Referral placed  3. ref  4. Diagnostic Studies:  non  5. Medication Management:  Refills provided  6. Further procedures recommended: not at this time  7. Recommendations to PCP: none  8. Follow up: 4 months    Carla Boyd MD    Pain Medicine  Department of Anesthesiology  [facility]      Answers for HPI/ROS submitted by the patient on 6/9/2022  CROW 7 TOTAL SCORE: 5  General Symptoms: No  Skin Symptoms: No  HENT Symptoms: No  EYE SYMPTOMS: No  HEART SYMPTOMS: No  LUNG SYMPTOMS: No  INTESTINAL SYMPTOMS: Yes  URINARY SYMPTOMS: No  GYNECOLOGIC SYMPTOMS: No  BREAST SYMPTOMS: No  SKELETAL SYMPTOMS: Yes  BLOOD SYMPTOMS: No  NERVOUS SYSTEM SYMPTOMS: No  MENTAL HEALTH SYMPTOMS: No  Heart burn or indigestion: No  Nausea: No  Vomiting: Yes  Abdominal pain: Yes  Bloating: Yes  Constipation: Yes  Diarrhea: No  Blood in stool: No  Black stools: No  Rectal or Anal pain: No  Fecal incontinence: No  Yellowing of skin or eyes: No  Vomit with blood: No  Change in stools: No  Back pain: Yes  Muscle aches: Yes  Neck pain: Yes  Swollen joints: Yes  Joint pain: Yes  Bone pain: No  Muscle cramps: Yes  Muscle weakness: Yes  Joint stiffness: Yes  Bone fracture: No      Sincerely,    Carla Boyd MD

## 2022-06-14 ENCOUNTER — VIRTUAL VISIT (OUTPATIENT)
Dept: NUTRITION | Facility: CLINIC | Age: 42
End: 2022-06-14
Payer: COMMERCIAL

## 2022-06-14 DIAGNOSIS — R10.9 CHRONIC ABDOMINAL PAIN: Primary | ICD-10-CM

## 2022-06-14 DIAGNOSIS — G89.29 CHRONIC ABDOMINAL PAIN: Primary | ICD-10-CM

## 2022-06-14 DIAGNOSIS — K86.1 CHRONIC PANCREATITIS, UNSPECIFIED PANCREATITIS TYPE (H): ICD-10-CM

## 2022-06-14 PROCEDURE — 97802 MEDICAL NUTRITION INDIV IN: CPT | Mod: 95 | Performed by: DIETITIAN, REGISTERED

## 2022-06-14 NOTE — PATIENT INSTRUCTIONS
NUTRITION DIAGNOSIS:    1. Altered GI function related to potential food sensitives/intolerances as evidenced by abdominal pain, gas, bloating and constipation when trying to implement foods after tube feeding.     NUTRITION INTERVENTION:    Long Term Goals:   Goal: Increase ability to tolerate solid foods   Goal: Decrease digestive symptoms -constipation, gas, bloating and stomach aches  Goal: Decrease potential nutrient deficiencies        Short Term Goals:  Goal 1: Focus on starting smoothie for breakfast - see the recipe provided in handouts and book below under resources - add in unsweetened flax, hemp or coconut milk or filtered water to start, 1 tsp- 1 tbsp of healthy fat such as flax seed ground or yvette seed ground (slowly introduce fiber as tolerated).1 scoop of plant based protein powder or collagen powder, 1 serving of lowfodmap fruit and veggie such as kale or, spinach      Also, check out some of the other breakfast ideas provided such as oatmeal (naturally gluten free) with dairy free alternative milk, flax seed, nuts such as walnuts, fruit such as berries and protein like eggs or chicken/turkey sausage.     Avocado on slice of gluten free bread recommend base [employer]) brand      [email or web address]     Birch chapman brand for pancakes - paleo  [email or web address]     Try siete soft taco shells with eggs and veggies in am for breakfast   [email or web address]     Goal 2:  Make sure to eat some snacks free of most reactive foods with balanced protein, fiber lower carb, healthy fats least 1-2 snacks daily around 10am and 3pm. Trying to incorporate more soft foods to start to help with better digestion.   Eating every three to four hours will help keep your insulin and blood sugar normal to  help with weight loss  - see meal plan and recipe ideas in the cardi metabolic guides provided.     Some Examples:   Beef Jerky with veggie or fruit   [email or web address]     Simple Mills Chips or peeled cucumber with hummus      Hummus with veggie      Fruit with nut butter - banana with sunflower seed butter or applesauce with almond butter         Goal 3:  My symptoms Tracker serena and start tracking what you are eating as well as severity of symptoms. [email or web address]        Smoothie Recipes to Kickoff Your Success!!     Pineapple Coconut Smoothie      Makes 1-2 serving     1-2 cup unsweetened coconut milk   1 scoop jimenez vanilla plant based protein powder or collagen protein powder by vital proteins (great for just having no flavor - plain)   1 cup frozen pineapple chunks   1 handful spinach (optional)     banana (optional - if added use less pineapple so not as high of sugar content)   1 tbsp shredded unsweetend coconut to top (optional)  1 tbsp ground flax seed (optional)     Method:     In  (recommend vitamix or blend tech) add dairy free alternative milk or filtered water. Add in the rest of ingredients blend on high for 2 mins. If desire thinner consistency add in the water or dairy alternative. Enjoy :)    Kiwi Spirulina Smoothie      Makes 1 servings     1 cup unsweetened macadamia nut milk or filtered water   1 scoop jimenez vanilla plant based protein powder or 1-2 scoops collagen powder from vital proteins (plain)   1-2 Kiwi s peeled   1 handful cilantro   1 lime juiced    - 1 avocado   2 tsp spirulina   1 inch jeimy fresh pilled      Method:     In  (recommend vitamix or blend tech) add dairy free alternative milk or filtered water. Add in the rest of ingredients blend on high for 2 mins. If desire thinner consistency add in the water or dairy alternative. Enjoy :)     Green Detox Smoothie      Makes 1 servings     1 cup unsweetened macadamia nut  milk or filtered water (I like filtered water more for this recipe)  1-2 scoops collagen powder from vital proteins (plain)   1 small granny nuñez apple (prefer organic - lower pesticides and endocrine disruptors)   1 handful cilantro   1 lime juiced    - 1 avocado (optional)   1 cucumber      Method:     In  (recommend vitamix or blend tech) add dairy free alternative milk or filtered water. Add in the rest of ingredients blend on high for 2 mins. If desire thinner consistency add in the water or dairy alternative. Enjoy :)        Spirulina Banana Smoothie      Makes 1 serving     1-2 cup unsweetened macadamia nut milk or filtered water (I like filtered water more for this recipe)  1 scoop jimenez vanilla plant based protein powder     banana   1 handful spinach    - 1 avocado  1-2 tsp spirulina    1 tbsp ground flax seed      Method:     In  (recommend vitamix or blend tech) add dairy free alternative milk or filtered water. Add in the rest of ingredients blend on high for 2 mins. If desire thinner consistency add in the water or dairy alternative. Enjoy :)    Banana Date Split Smoothie      Makes 1-2 servings     1-2 cup unsweetened almond/macadamia nut milk or filtered water (I like filtered water more for this recipe)  1 scoop jimenez chocolate plant based protein powder or collagen protein powder     -1  banana   1 handful spinach    - 1 avocado  1 -3 tsp cinnamon   1 tbsp yvette seeds   1-2 dates (optional)        Method:     In  (recommend vitamix or blend tech) add dairy free alternative milk or filtered water. Add in the rest of ingredients blend on high for 2 mins. If desire thinner consistency add in the water or dairy alternative. Enjoy :)     Berry Blast Smoothie      Makes 1 serving     1-2 cup unsweetened macadamia nut milk or filtered water (I like filtered water more for this recipe)  1 scoop jimenez vanilla plant based protein powder or collagen protein powder by vital proteins (plain)    1 cup mixed berries    1 handful spinach    - 1 avocado  1-2 tsp raw organic maqui berry powder by Sunfood superfoods (optional but very immune boosting and jammed with phytonutrients)   1 tbsp ground flax seed      Method:     In  (recommend vitamix or blend tech) add dairy free alternative milk or filtered water. Add in the rest of ingredients blend on high for 2 mins. If desire thinner consistency add in the water or dairy alternative. Enjoy :)     Superfood + Immune Boosting Powders:  Maqui Berry Powder   Turmeric Powder   Spirulina Powder   Glutamine Powder   Matcha Powder   Kristina fresh or powdered   Kale, dandelion greens or spinach         Omega 3 and Protein Desired Toppings:   Add some healthy protein and omega 3 packed seeds for an extra special nutrient packed topping and a little extra crunch!   1 tsp -1 tbps ground flax seed   1 tsp -1 tbsp hemp seeds               1 tsp-1 tbsp yvette seeds           REMOVE  Remove anything that could be irritating to the gut such as reactive foods, stress    Avoid Common Trigger foods. Dairy tops the list. The proteins like casein and whey in dairy can irritate and inflame your gut, while gluten the protein in wheat, rye and barley is a close second. Give those foods up for at least 3-6 months and see if digestion, blood sugars, weight and overall health improve.           Continue to eliminate gluten, dairy, soy, corn, processed refined grains, sugar, and caffeine         Monitor if high FODMAP foods (wheat, dairy, onion, garlic are some of the top fodmaps) and nightshades contribute to your symptoms. Top common nightshades are white potatoes, tomatoes, egg plant, bell peppers and hot peppers. Also, monitor foods high in histamine and fermented foods to see if trigger symptoms - see Elimination Food Plan Guide plus food plan and FODMAPS handout     Track what you eat. Writing down or tracking through an serena what you eat as well as how you feel and help you  identify patterns in your symptoms. This can help you become more aware and create a diet that is right for you.    Pick a food tracking serena:         Through the mysEarlyTrackss serena, you can track symptoms, bowel movements, medications, stress, exercise, sleep and foods as well as beverages to become more mindful. [email or web address]    **track by paper if serena feels like too much. Make list of foods that cause symptoms to provide and review at follow up. I recommend the dailygreatness journal as you can track your goals, mindset, foods etc to keep you on track and motivated.     REINTRODUCE    Eat real anti-inflammatory foods. When you eat whole, real foods in their unprocessed forms, you take the first step to healing our gut and overall health. Eat plenty of vegetables, fruits, non-gluten whole grains (monitor for symptoms), beans, nuts, seeds, lean meats, healthy fats and other plant foods.    Start following reintroduction phase (see guide for details) of elimination diet to see if certain foods trigger symptoms. If you are avoiding FODMAPS focus on keeping out wheat and gluten and then add in cashew butter or high fodmap nuts 1 serving at a time to see if this high fodmap food for example gives you issues. The same can be done for nightshades. Tracking your foods and symptoms can be beneficial in helping you identify patterns while becoming more aware of what you eat.    Focus on Reintroduction Diet: Introduce foods only one food at a time for one day, followed by a 24 hour observation period. I like to focus on keeping the food out for at least 3-4 days and monitor symptoms. If no reactions occur, add in another food.  It's important to wait till symptoms subside before you add in another food to help you better pinpoint a trigger food.      Food plan - 1,400-1,800calories per day          Protein 9-10 servings per day - include at each meal to stabilize blood sugars  (Choose 3oz or 21g per meal and aim  for 1oz of 7g for snacks)  -       Strive for 1-2 servings of fish per week especially of higher omega-3 fatty acid containing fish such as salmon.          Legumes 1-2 serving per day (monitor for digestive issues try hummus first)          Dairy alternatives 1-2 serving per day          nuts and seeds 3-4 servings per day - great to incorporate as snacks - nut butters/seed butters and or powders and or sprouted nuts/seeds may be easier to digest and experiment with          Fats and oils 4 servings per day          Non starchy vegetables 7-8 servings per day          Starchy vegetable limit 1 serving per day as they tend to impact blood sugar (they are moderate-GI).          Fruits 2 servings per day - best to couple with a little bit of protein or fat to offset a rise in blood sugars (they are low-moderate-GI foods and monitor if high fodmap are harder to tolerate or trigger symptoms)          Whole grains 1-2 serving per day - try gluten free whole grains instead (focus on cutting back on wheat to see if this higher fodmap food triggers symptoms)       Incorporate protein powder daily:         Plant based hemp (recommended brands: [employer], Kilopass, Just Hemp Protein, [employer] Mill)          Plant based pea (recommended brands: Naked Pea, Now Sports). If you want to try a combo of pea and hemp the brand Castano in vanilla or chocolate is a great option.         Try Bone broth protein powder or collagen peptides in liquid bone broth, vegetable broth or 12 oz of water as snack. The bone broth powder and collagen can be used for soups as well. This can help provide essential amino acids and minerals that heal your gut as well as balance blood sugars. A great option if you have a hard time tolerating solids.    Can also consider pre made shakes if unable to make smoothies.      Brand examples that are gluten and dairy free to try:   1) Orgain Vegan Protein Shakes, 20g of Plant Based Protein, Creamy Chocolate -  Gluten Free, No Dairy, Soy, or Preservatives, No Added Sugar  2) Pirq, Vegan Protein Shake, Turmeric Curcumin, Brie, Plant-Based Protein Drink, Gluten-Free, Dairy-Free, Soy-Free, Non-GMO, Vegetarian, Kosher, Keto, Low Carb, Low Calorie  3) OWYN Pro Elite Vegan Plant-Based High Protein Shake, 35g Protein, 9 Amino Acids, Omega-3, Prebiotics, Superfoods Greens for Workout and Recovery, 0g Net Carbs, Zero Sugar, Keto  4) Ripple Vegan Protein Shake  Chocolate  20g Nutritious Plant Based Pea Protein  Shelf Stable  No GMOs, Soy, Nut, Gluten, Lactose  5) Profex Glucose Support 1.2 Plant based, dairy free, low glycemic index  [email or web address].leemail/products/glucose-[email or web address]    Choose Low Glycemic (GI) foods: Regulate your sugar levels by eating foods that do not spike blood sugars.  Eat low -GI foods so only small fluctuations in blood glucose and insulin levels are produced.     Examples of low-GI foods: nuts, seeds, GF oats, most vegetables especially non-starchy and fruits.    Medium or high-GI foods should be eaten with a protein or fat, both of which blunt the glycemic effect of these foods. This reduces the overall glycemic impact of a meal.  Ex: Most grains and starchy veggies are medium/high GI.    Avoid foods containing refined sugars, artificial sweeteners, and refined grains they are considered high-GI because they lead to sharp increases in blood sugars levels, which increase insulin sensitivity causing increased TG, and low good cholesterol (HDL).  Ex: cakes, cookies, pies, bread, sodas, fruit drinks, presweetened tea, coffee drinks, energy or sport drinks, flavored milk and other processed foods.      Drink more water. Hydration is critical, so drink at least six to eight glasses of water a day. Drink more water between meals and less at meals.    Alcohol and caffeine can stimulate your intestines, which may cause diarrhea. Artificial sweeteners that contain sugar alcohols such as  sorbitol, mannitol and xylitol may cause diarrhea too. Carbonated drinks can produce gas.  Fiber draws water from your body to move foods through your intestine. Without enough  water and fluids, you may become constipated.  Try adding herbal teas (sugar free) or lemon/lime/cucumber/fruit to water for flavor. Avoid artificial sweetener packets to flavor your water.  Cut back on coffee switch to green tea. Avoid adding sugar and milk to coffee instead use dairy alternatives such as almond, flax, coconut milk.Try another coffee substitute such as   -https://[employer]/  -https://[facility].[employer].tv      Choose foods high in fiber: Aim for at least 5 grams of fiber per serving of food or a total of 25-35 grams fiber per day. Remember, when looking at the label, you can take the fiber away from the total carbs. Ex:15g of total carbs - 4g of fiber = 11g net carbs    Insoluble fiber acts like a bulky  inner broom,  sweeping out debris from the intestine and creating more motility and movement.     Soluble fiber attracts water and swells, creating a gel that slows digestion.  Also, slows the release of glucose from foods into the blood which stops spikes in blood sugar levels.  Soluble fiber traps toxins in the gut, helping to carry them to excretion and provides healthy bacteria in the digestive tract.        NUTRITION RESOURCES:         IFM Elimination Diet - Recipes, guide, meal plan, supplement handouts

## 2022-06-14 NOTE — PROGRESS NOTES
Medical Nutrition Therapy  Visit Type: Initial assessment and intervention    Visit Details    How would you like to obtain your AVS? MyChart  If the correspondence for visit is dropped, how would you like your dietitian to reconnect with you:   call back by phone? Yes    Text to cell phone: [phone]   Will anyone else be joining your video visit or telephone call? No  {If patient encounters technical issues they should call [phone] :15    Type of service:  Video Visit or Telephone     Start Time: 2:33PM    End Time:3:09 PM    Originating Location (pt. Location): Home    Distant Location (provider location):  [facility] FROM HOME     Platform used for Video Visit: Shaneka      Referring Provider: Carla Boyd  Replaced by [facility]     REASON FOR REFERRAL:   Alexandra Nunez is a 41 year old female who is interested in Medical Nutrition Therapy (MNT) and education related to GI issues - constipation, gas, bloating, Abdominal Pain and pancreatitis.   She is accompanied by self.     NUTRITION ASSESSMENT:   No flowsheet data found.     Neurological 6/14/2022   Migraine Headaches Past;Current   Tension Headache Past;Current       No flowsheet data found.   No flowsheet data found.   Gastrointestinal 6/14/2022   Constipation Past;Current   Nausea or Vomiting Past   Gas/bloating Past;Current   Gallstones Past   Blood in stool Past;Current   Pancreatitis Past;Current   Abdominal Pain Past;Current       No flowsheet data found.   Endocrine 6/14/2022   Hair thinning/loss Past      Skin 6/14/2022   Eczema Past;Current      No flowsheet data found.   No flowsheet data found.   Psychological 6/14/2022   Depression/Anxiety Past;Current   Anorexia Past      No flowsheet data found.   Womens Health Assessment 6/14/2022   Hysterectomy No   I am pregnant.  No   I am breastfeeding. No   I want to become pregnant within the next year . No   What do you use for contraception?  not  needed/not sexually active   Any problems with current birth control method?   No   Are your periods irregular? Yes   Irregular periods Missed months   Are you officially in menopause? (no period for one year or longer)  No        Past Medical History:  Past Medical History:   Diagnosis Date     Pacemaker        Previous Surgeries:   Past Surgical History:   Procedure Laterality Date     INJECT NERVE BLOCK CELIAC PLEXUS Bilateral 3/18/2022    Procedure: Celiac Plexus Block;  Surgeon: Carla Boyd MD;  Location: [facility] OR     IR SINOGRAM INJECTION DIAGNOSTIC  7/12/2021     IR SINOGRAM INJECTION DIAGNOSTIC  7/22/2021     IR SINOGRAM INJECTION DIAGNOSTIC  8/12/2021     IR SINOGRAM INJECTION DIAGNOSTIC  8/25/2021     IR SINOGRAM INJECTION DIAGNOSTIC  9/22/2021     IR SINOGRAM INJECTION DIAGNOSTIC  8/19/2021     IR SINOGRAM INJECTION THERAPEUTIC  8/5/2021     IR SINOGRAM INJECTION THERAPEUTIC  9/8/2021        Family History:  No family history on file.     Lifestyle History:  Lifestyle 6/14/2022   Do you feel your life is stressful right now?  Yes   What is the cause(s) of stress in your life?  Over thinking and too much analysis;Taking care of parents, children or other family member   Are you currently implementing any strategies to help manage stress? Yes   What are you doing to manage stress?  Meditation;Breathing exercises;Affirmations;Acupuncture        Exercise History:  Exercise 6/14/2022   Does your occupation require extended periods of sitting?  Yes   Does your occupation require extended periods of repetitive movements (ex: walking or lifting)?  No   Do you currently participate in any forms of exercise? No   Rate your level of motivation for including exercise in your routine (0=none, 10=high): 5   Do you have any medical conditions, pain, injuries, surgeries etc. restricting you from exercise? Yes        Sleep History:  Sleep 6/14/2022   How many hours (on average) do you sleep per night? Less than 4    What time do you turn off the lights? 10 PM   How long does it take for you to fall asleep? 30-45 mins   What time do you stop using electronic devices? 8 PM   What time do you wake up? 5 AM   When do you eat your first meal?  8 AM   Do you feel well-rested during the day?  No   Do you take naps?  No   Do you have a comfortable bedroom environment (cool, quiet, dark, etc)? Yes   Do you have a sleep routine/ ritual that you do before bed?  Yes   How many hours do you spend per day looking at a screen (TV, computer, tablet and phone)? 0 to 2   Select all factors that apply to your current sleep habits: Difficulty falling asleep;Wake up in the middle of the night;Not hungry in the morning        Nutrition History:  Nutrition 6/14/2022   Have you ever had a nutrition consultation? Yes   Do you currently follow a special diet or nutritional program? No   What do you feel are the biggest barriers getting in the way of achieving you nutritional goals? Other   Do you have any food allergies, sensitivities or intolerances?  Yes   Specific food(s) causing adverse reactions Gluten       Digestion 6/14/2022   Do you experience stomach pains/cramping? Daily   Do you experience bloating?  Daily   Do you experience gas?  Daily   Do you experience heartburn/acid reflux/indigestion? Rarely   How often do you have a bowel movement? Once per week or less   What is a typical bowel movement like for you? Select all that apply: Hard to pass      Food Access:  6/14/2022   Who does the grocery shopping? Self   How often is grocery shopping done? Weekly   Where do you usually receive your groceries from? Select all that apply: [employer]   Do you read food labels? Yes   What do you look at?  Other   Who does the cooking? Select all that apply: Self   How many meals do you eat out per week?  0 to 1   What restaurants do you typically choose? Other      Daily Patterns: 6/14/2022   How many days per week do you have breakfast? 4   How  many days per week do you have lunch? 4   How many days per week do you have dinner? 4   How many days per week do you have snacks? 0      Protein Intake: 6/14/2022   How many times per day do you typically consume a protein source(s)? 3   What types of protein do you currently eat?  Other Soy/Meat Alternative       Fat Intake:  6/14/2022   How many times per day do you typically consume healthy fat(s)? 0   What types of health fats do you currently eat? Select all that apply:  Peanut butter       Fruit Intake:  6/14/2022   How many times per day do you typically consume fruits? 2   What types of fruit do currently eat? Pineapple       Vegetable Intake:  6/14/2022   How many times per day do you typically consume vegetables? 0   What types of vegetables do you currently eat? Broccoli      Grain Intake:  6/14/2022   How many times per day do you typically consume grains? 0   What types of grains do currently eat? Select all that apply:  Other (non-gluten free)       Dairy Intake:  6/14/2022   How many times per day do you typically consume dairy? 1   What types of dairy do currently eat? Select all that apply:  Yogurt       Non-Dairy Alternative Intake:  6/14/2022   How many times per day do you typically consume non-dairy alternatives? 0   What types of non-dairy alternatives do currently eat? Select all that apply:  Oat milk       Sweets Intake:  6/14/2022   How many times per day do you typically consume sweets? 0      Beverage Intake:  6/14/2022   How many 8 oz cups of water do you typically consume per day?  2 to 3   How many 8 oz cups of caffeine do you typically consume per day?  1 to 2   How many drinks of alcohol do you typically consume per week (1 drink = 5 oz wine, 12 oz beer, 1.5 oz spirits)?   0       Lifestyle Recall:  6/14/2022   What time did you wake up? 5 AM   What time did you go to sleep? 10 PM   What time did you have breakfast? 8-9 AM   Where did you have breakfast?  Work   What time did you  have a morning snack? No snack   What time did you have lunch? 11AM-12 PM   Where did you have lunch?  Work   What time did you have an afternoon snack? No snack   What time did you have dinner? 6-7 PM   Where did you have dinner?  Home   What time did you have an evening snack? No Snack   What time of day did you exercise? No Exercise          Additional concerns: Never really hungry so eats sometimes gluten free becca cracker or cheese stick   Breakfast: Makes herself have a protein drink fair life drink   Lunch: fair life drink   Dinner: Egg whites with piece of toast - yolks seem to be harder to digest     To help increase calories does crackers or cheese stick     When she was hunger she got dx with wheat intolerance before knowing more about celiac disease. She will play around with gluten free but doesn't avoid consistently. She hasn't had a lot of foods since being on the feeding tube. She was just told to eat what she can and see what affects her. She feels like everything hurts so didn't get to try various foods.     Discussed doing some more pureed foods to help with digestion and transfer to solids after tube feeding a year ago. She notices that with certain protein drinks she gets cramps. She has been doing the fair light because it seems to be easier to digest.     [email or web address]    MEDICATIONS:  Current Outpatient Medications   Medication Sig Dispense Refill     albuterol (PROAIR HFA/PROVENTIL HFA/VENTOLIN HFA) 108 (90 Base) MCG/ACT inhaler Inhale 1-2 puffs into the lungs every 4 hours as needed for shortness of breath / dyspnea or wheezing       albuterol (PROVENTIL) (2.5 MG/3ML) 0.083% neb solution Take 2.5 mg by nebulization every 4 hours as needed for shortness of breath / dyspnea or wheezing       ALPRAZolam (XANAX) 0.5 MG tablet Take 0.5 mg by mouth 2 times daily as needed for anxiety       amitriptyline (ELAVIL) 25 MG tablet Take 1 tablet (25 mg) by mouth At  Bedtime 30 tablet 1     fluticasone (FLOVENT HFA) 110 MCG/ACT inhaler Inhale 2 puffs into the lungs 2 times daily       ibuprofen (ADVIL/MOTRIN) 100 MG/5ML suspension Take 600 mg by mouth every 6 hours as needed for fever or moderate pain       levonorgestrel (MIRENA) 20 MCG/24HR IUD 1 each by Intrauterine route once       methocarbamol (ROBAXIN) 750 MG tablet Take 1 tablet (750 mg) by mouth 4 times daily as needed for muscle spasms 90 tablet 1     ondansetron (ZOFRAN) 4 MG tablet Take by mouth every 6 hours as needed for nausea       sodium chloride, PF, (SALINE FLUSH) 0.9% PF flush 3 mLs by Intracatheter route every 8 hours       tiZANidine (ZANAFLEX) 2 MG tablet Take 1 tablet (2 mg) by mouth 3 times daily as needed for muscle spasms 90 tablet 1     traMADol (ULTRAM) 50 MG tablet Take 1 tablet (50 mg) by mouth 2 times daily as needed for severe pain 20 tablet 0     Vitamin D3 (CHOLECALCIFEROL) 25 mcg (1000 units) tablet Take 1 tablet by mouth daily         No flowsheet data found.      ALLERGIES:   Allergies   Allergen Reactions     Nkda [No Known Drug Allergies]         .na  LABS:  Last Basic Metabolic Panel:  Lab Results   Component Value Date     12/16/2021     06/23/2021     06/22/2021     06/21/2021      Lab Results   Component Value Date    POTASSIUM 3.8 12/16/2021    POTASSIUM 4.7 06/23/2021    POTASSIUM 4.6 06/22/2021    POTASSIUM 4.5 06/21/2021     Lab Results   Component Value Date    CHLORIDE 108 12/16/2021    CHLORIDE 99 06/23/2021    CHLORIDE 97 06/22/2021    CHLORIDE 100 06/21/2021     Lab Results   Component Value Date    MARTIN 8.9 12/16/2021    MARTIN 9.5 06/23/2021    MARTIN 9.4 06/22/2021    MARTIN 9.4 06/21/2021     Lab Results   Component Value Date    CO2 24 12/16/2021    CO2 28 06/23/2021    CO2 29 06/22/2021    CO2 29 06/21/2021     Lab Results   Component Value Date    BUN 8 12/16/2021    BUN 17 06/23/2021    BUN 15 06/22/2021    BUN 14 06/21/2021     Lab Results   Component  Value Date    CR 0.49 12/16/2021    CR 0.61 06/23/2021    CR 0.52 06/22/2021    CR 0.49 06/21/2021     Lab Results   Component Value Date     12/16/2021     06/23/2021    GLC 99 06/22/2021     06/21/2021       Last Glucose Profile:   No results found for: A1C    Last Lipid Profile:   No results found for: CHOL  No results found for: HDL  No results found for: LDL  No results found for: TRIG  No results found for: CHOLHDLRATIO    Most recent CBC:  Recent Labs   Lab Test 12/16/21  1730 06/23/21  0744 06/22/21  0724   WBC 5.2 6.1 7.7   HGB 12.0 8.3* 8.3*   HCT 35.6 28.1* 27.5*    540* 566*     Most recent hepatic panel:  Recent Labs   Lab Test 12/16/21  1729 06/23/21  0744   ALT 18 27   AST 12 23     Most recent creatinine:  Recent Labs   Lab Test 12/16/21  1729 06/23/21  0744   CR 0.49* 0.61       No components found for: GFRESETIMATEDLASTLAB(gfrestblack:1@  Lab Results   Component Value Date    ALBUMIN 4.1 12/16/2021    ALBUMIN 2.6 06/23/2021       Last Thyroid Profile:   No results found for: TSH, T4    Last Mineral Profile:   No results found for: MELISSA, IRON, FEB    Autoimmune & Inflammatory   CRP Inflammation   Date Value Ref Range Status   06/23/2021 26.0 (H) 0.0 - 8.0 mg/L Final         Last Vitamin Profile:   No results found for: RBO216, DGGK252, WXIG32KWESJ, VITD3, D2VIT, D3VIT, DTOT, [id number], [id number], [id number], [id number], [id number], [id number]    ANTHROPOMETRICS:  Vitals:   BP Readings from Last 1 Encounters:   06/09/22 129/79     Pulse Readings from Last 1 Encounters:   06/09/22 77     Estimated body mass index is 23.06 kg/m  as calculated from the following:    Height as of 3/18/22: 1.829 m (6').    Weight as of 3/18/22: 77.1 kg (170 lb).    Wt Readings from Last 5 Encounters:   03/18/22 77.1 kg (170 lb)   09/08/21 88.5 kg (195 lb)   07/26/21 90.7 kg (200 lb)   07/22/21 90.7 kg (200 lb)   07/19/21 90.7 kg (200 lb)     NUTRITION DIAGNOSIS:    1. Altered GI function  related to potential food sensitives/intolerances as evidenced by abdominal pain, gas, bloating and constipation when trying to implement foods after tube feeding.     NUTRITION INTERVENTION:    Long Term Goals:   Goal: Increase ability to tolerate solid foods   Goal: Decrease digestive symptoms -constipation, gas, bloating and stomach aches  Goal: Decrease potential nutrient deficiencies        Short Term Goals:  Goal 1: Focus on starting smoothie for breakfast - see the recipe provided in handouts and book below under resources - add in unsweetened flax, hemp or coconut milk or filtered water to start, 1 tsp- 1 tbsp of healthy fat such as flax seed ground or yvette seed ground (slowly introduce fiber as tolerated).1 scoop of plant based protein powder or collagen powder, 1 serving of lowfodmap fruit and veggie such as kale or, spinach      Also, check out some of the other breakfast ideas provided such as oatmeal (naturally gluten free) with dairy free alternative milk, flax seed, nuts such as walnuts, fruit such as berries and protein like eggs or chicken/turkey sausage.     Avocado on slice of gluten free bread recommend base [employer] brand      [email or web address]     Birch chapman brand for pancakes - paleo  [email or web address]/pancake-waffle-mix/products/paleo     Try siete soft taco shells with eggs and veggies in am for breakfast   https://Neul/collections/tortillas/products/zgnoad-iqvew-dvhpqzbqp-6-pack        Goal 2:  Make sure to eat some snacks free of most reactive foods with balanced protein, fiber lower carb, healthy fats least 1-2 snacks daily around 10am and 3pm. Trying to incorporate more soft foods to start to help with better digestion.   Eating every three to four hours will help keep your insulin and blood sugar normal to help with weight loss  - see meal plan and  recipe ideas in the cardi metabolic guides provided.     Some Examples:   Beef Jerky with veggie or fruit   [email or web address]/recipe/3-ingredient-yvette-pudding/     Simple Mills Chips or peeled cucumber with hummus      Hummus with veggie      Fruit with nut butter - banana with sunflower seed butter or applesauce with almond butter         Goal 3:  My symptoms Tracker serena and start tracking what you are eating as well as severity of symptoms. Www.mysymptoms.net        Smoothie Recipes to Kickoff Your Success!!     Pineapple Coconut Smoothie      Makes 1-2 serving     1-2 cup unsweetened coconut milk   1 scoop jimenez vanilla plant based protein powder or collagen protein powder by vital proteins (great for just having no flavor - plain)   1 cup frozen pineapple chunks   1 handful spinach (optional)     banana (optional - if added use less pineapple so not as high of sugar content)   1 tbsp shredded unsweetend coconut to top (optional)  1 tbsp ground flax seed (optional)     Method:     In  (recommend vitamix or blend tech) add dairy free alternative milk or filtered water. Add in the rest of ingredients blend on high for 2 mins. If desire thinner consistency add in the water or dairy alternative. Enjoy :)    Kiwi Spirulina Smoothie      Makes 1 servings     1 cup unsweetened macadamia nut milk or filtered water   1 scoop jimenez vanilla plant based protein powder or 1-2 scoops collagen powder from vital proteins (plain)   1-2 Kiwi s peeled   1 handful cilantro   1 lime juiced    - 1 avocado   2 tsp spirulina   1 inch jeimy fresh pilled      Method:     In  (recommend vitamix or blend tech) add dairy free alternative milk or filtered water. Add in the rest of ingredients blend on high for 2 mins. If desire thinner consistency add in the water or dairy alternative. Enjoy :)     Green Detox Smoothie      Makes 1 servings     1 cup unsweetened macadamia nut milk or filtered water (I like filtered water  more for this recipe)  1-2 scoops collagen powder from vital proteins (plain)   1 small granny nuñez apple (prefer organic - lower pesticides and endocrine disruptors)   1 handful cilantro   1 lime juiced    - 1 avocado (optional)   1 cucumber      Method:     In  (recommend vitamix or blend tech) add dairy free alternative milk or filtered water. Add in the rest of ingredients blend on high for 2 mins. If desire thinner consistency add in the water or dairy alternative. Enjoy :)        Spirulina Banana Smoothie      Makes 1 serving     1-2 cup unsweetened macadamia nut milk or filtered water (I like filtered water more for this recipe)  1 scoop jimenez vanilla plant based protein powder     banana   1 handful spinach    - 1 avocado  1-2 tsp spirulina    1 tbsp ground flax seed      Method:     In  (recommend vitamix or blend tech) add dairy free alternative milk or filtered water. Add in the rest of ingredients blend on high for 2 mins. If desire thinner consistency add in the water or dairy alternative. Enjoy :)    Banana Date Split Smoothie      Makes 1-2 servings     1-2 cup unsweetened almond/macadamia nut milk or filtered water (I like filtered water more for this recipe)  1 scoop jimenez chocolate plant based protein powder or collagen protein powder     -1  banana   1 handful spinach    - 1 avocado  1 -3 tsp cinnamon   1 tbsp yvette seeds   1-2 dates (optional)        Method:     In  (recommend vitamix or blend tech) add dairy free alternative milk or filtered water. Add in the rest of ingredients blend on high for 2 mins. If desire thinner consistency add in the water or dairy alternative. Enjoy :)     Berry Blast Smoothie      Makes 1 serving     1-2 cup unsweetened macadamia nut milk or filtered water (I like filtered water more for this recipe)  1 scoop jimenez vanilla plant based protein powder or collagen protein powder by vital proteins (plain)   1 cup mixed berries    1 handful spinach    -  1 avocado  1-2 tsp raw organic maqui berry powder by Sunfood superfoods (optional but very immune boosting and jammed with phytonutrients)   1 tbsp ground flax seed      Method:     In  (recommend vitamix or blend tech) add dairy free alternative milk or filtered water. Add in the rest of ingredients blend on high for 2 mins. If desire thinner consistency add in the water or dairy alternative. Enjoy :)     Superfood + Immune Boosting Powders:  Maqui Berry Powder   Turmeric Powder   Spirulina Powder   Glutamine Powder   Matcha Powder   Kristina fresh or powdered   Kale, dandelion greens or spinach         Omega 3 and Protein Desired Toppings:   Add some healthy protein and omega 3 packed seeds for an extra special nutrient packed topping and a little extra crunch!   1 tsp -1 tbps ground flax seed   1 tsp -1 tbsp hemp seeds               1 tsp-1 tbsp yvette seeds           REMOVE  Remove anything that could be irritating to the gut such as reactive foods, stress    Avoid Common Trigger foods. Dairy tops the list. The proteins like casein and whey in dairy can irritate and inflame your gut, while gluten the protein in wheat, rye and barley is a close second. Give those foods up for at least 3-6 months and see if digestion, blood sugars, weight and overall health improve.            Continue to eliminate gluten, dairy, soy, corn, processed refined grains, sugar, and caffeine          Monitor if high FODMAP foods (wheat, dairy, onion, garlic are some of the top fodmaps) and nightshades contribute to your symptoms. Top common nightshades are white potatoes, tomatoes, egg plant, bell peppers and hot peppers. Also, monitor foods high in histamine and fermented foods to see if trigger symptoms - see Elimination Food Plan Guide plus food plan and FODMAPS handout     Track what you eat. Writing down or tracking through an serena what you eat as well as how you feel and help you identify patterns in your symptoms. This can help  you become more aware and create a diet that is right for you.    Pick a food tracking serena:          Through the mysSupplyHogs serena, you can track symptoms, bowel movements, medications, stress, exercise, sleep and foods as well as beverages to become more mindful. Https://[email or web address]    **track by paper if serena feels like too much. Make list of foods that cause symptoms to provide and review at follow up. I recommend the dailygreatness journal as you can track your goals, mindset, foods etc to keep you on track and motivated.     REINTRODUCE    Eat real anti-inflammatory foods. When you eat whole, real foods in their unprocessed forms, you take the first step to healing our gut and overall health. Eat plenty of vegetables, fruits, non-gluten whole grains (monitor for symptoms), beans, nuts, seeds, lean meats, healthy fats and other plant foods.    Start following reintroduction phase (see guide for details) of elimination diet to see if certain foods trigger symptoms. If you are avoiding FODMAPS focus on keeping out wheat and gluten and then add in cashew butter or high fodmap nuts 1 serving at a time to see if this high fodmap food for example gives you issues. The same can be done for nightshades. Tracking your foods and symptoms can be beneficial in helping you identify patterns while becoming more aware of what you eat.    Focus on Reintroduction Diet: Introduce foods only one food at a time for one day, followed by a 24 hour observation period. I like to focus on keeping the food out for at least 3-4 days and monitor symptoms. If no reactions occur, add in another food.  It's important to wait till symptoms subside before you add in another food to help you better pinpoint a trigger food.      Food plan - 1,400-1,800calories per day          Protein 9-10 servings per day - include at each meal to stabilize blood sugars  (Choose 3oz or 21g per meal and aim for 1oz of 7g for snacks)  -       Strive for 1-2  servings of fish per week especially of higher omega-3 fatty acid containing fish such as salmon.          Legumes 1-2 serving per day (monitor for digestive issues try hummus first)          Dairy alternatives 1-2 serving per day          nuts and seeds 3-4 servings per day - great to incorporate as snacks - nut butters/seed butters and or powders and or sprouted nuts/seeds may be easier to digest and experiment with          Fats and oils 4 servings per day          Non starchy vegetables 7-8 servings per day          Starchy vegetable limit 1 serving per day as they tend to impact blood sugar (they are moderate-GI).          Fruits 2 servings per day - best to couple with a little bit of protein or fat to offset a rise in blood sugars (they are low-moderate-GI foods and monitor if high fodmap are harder to tolerate or trigger symptoms)          Whole grains 1-2 serving per day - try gluten free whole grains instead (focus on cutting back on wheat to see if this higher fodmap food triggers symptoms)       Incorporate protein powder daily:          Plant based hemp (recommended brands: [employer], Simtrol, Just Hemp Protein, Adelfo's Red Mill)           Plant based pea (recommended brands: Naked Pea, Now Sports). If you want to try a combo of pea and hemp the brand Castano in vanilla or chocolate is a great option.          Try Bone broth protein powder or collagen peptides in liquid bone broth, vegetable broth or 12 oz of water as snack. The bone broth powder and collagen can be used for soups as well. This can help provide essential amino acids and minerals that heal your gut as well as balance blood sugars. A great option if you have a hard time tolerating solids.    Can also consider pre made shakes if unable to make smoothies.      Brand examples that are gluten and dairy free to try:   1) Orgain Vegan Protein Shakes, 20g of Plant Based Protein, Creamy Chocolate - Gluten Free, No Dairy, Soy, or Preservatives,  No Added Sugar  2) Pirq, Vegan Protein Shake, Turmeric Curcumin, Brie, Plant-Based Protein Drink, Gluten-Free, Dairy-Free, Soy-Free, Non-GMO, Vegetarian, Kosher, Keto, Low Carb, Low Calorie  3) OWYN Pro Elite Vegan Plant-Based High Protein Shake, 35g Protein, 9 Amino Acids, Omega-3, Prebiotics, Superfoods Greens for Workout and Recovery, 0g Net Carbs, Zero Sugar, Keto  4) Ripple Vegan Protein Shake  Chocolate  20g Nutritious Plant Based Pea Protein  Shelf Stable  No GMOs, Soy, Nut, Gluten, Lactose  5) [employer] Glucose Support 1.2 Plant based, dairy free, low glycemic index  https://[employer] [place]/products/glucose-support-1-2-vanilla    Choose Low Glycemic (GI) foods: Regulate your sugar levels by eating foods that do not spike blood sugars.  Eat low -GI foods so only small fluctuations in blood glucose and insulin levels are produced.     Examples of low-GI foods: nuts, seeds, GF oats, most vegetables especially non-starchy and fruits.    Medium or high-GI foods should be eaten with a protein or fat, both of which blunt the glycemic effect of these foods. This reduces the overall glycemic impact of a meal.  Ex: Most grains and starchy veggies are medium/high GI.    Avoid foods containing refined sugars, artificial sweeteners, and refined grains they are considered high-GI because they lead to sharp increases in blood sugars levels, which increase insulin sensitivity causing increased TG, and low good cholesterol (HDL).  Ex: cakes, cookies, pies, bread, sodas, fruit drinks, presweetened tea, coffee drinks, energy or sport drinks, flavored milk and other processed foods.      Drink more water. Hydration is critical, so drink at least six to eight glasses of water a day. Drink more water between meals and less at meals.     Alcohol and caffeine can stimulate your intestines, which may cause diarrhea. Artificial sweeteners that contain sugar alcohols such as sorbitol, mannitol and xylitol may cause diarrhea  too. Carbonated drinks can produce gas.    Fiber draws water from your body to move foods through your intestine. Without enough  water and fluids, you may become constipated.    Try adding herbal teas (sugar free) or lemon/lime/cucumber/fruit to water for flavor. Avoid artificial sweetener packets to flavor your water.    Cut back on coffee switch to green tea. Avoid adding sugar and milk to coffee instead use dairy alternatives such as almond, flax, coconut milk.Try another coffee substitute such as   -https://[employer]/  -https://[employer].CUPS      Choose foods high in fiber: Aim for at least 5 grams of fiber per serving of food or a total of 25-35 grams fiber per day. Remember, when looking at the label, you can take the fiber away from the total carbs. Ex:15g of total carbs - 4g of fiber = 11g net carbs    Insoluble fiber acts like a bulky  inner broom,  sweeping out debris from the intestine and creating more motility and movement.     Soluble fiber attracts water and swells, creating a gel that slows digestion.  Also, slows the release of glucose from foods into the blood which stops spikes in blood sugar levels.  Soluble fiber traps toxins in the gut, helping to carry them to excretion and provides healthy bacteria in the digestive tract.        NUTRITION RESOURCES:          IFM Elimination Diet - Recipes, guide, meal plan, supplement handouts          PATIENT'S BEHAVIOR CHANGE GOALS:   See nutrition intervention for patient stated behavior change goals. AVS was printed and given to patient at today's appointment.    MONITOR / EVALUATE:  Registered Dietitian will monitor/evaluate the following:     Beliefs and attitudes related to food    Food and nutrition knowledge / skills    Food / Beverage / Nutrient intake     Pertinent Labs    Progress toward meeting stated nutrition-related goals    Readiness to change nutrition-related behaviors    Weight change    Digestion     COORDINATION OF CARE:  Follow  up with referring provider as needed       FOLLOW-UP:  Follow-up appointment scheduled on June 24th at 12:30pm virtual visit.     Time spent in minutes: 35 minutes 2 units   Encounter: Individual    Ailyn Mattson RD, CLT, LD  Integrative Registered Dietitian

## 2022-06-24 ENCOUNTER — VIRTUAL VISIT (OUTPATIENT)
Dept: NUTRITION | Facility: CLINIC | Age: 42
End: 2022-06-24
Payer: COMMERCIAL

## 2022-06-24 DIAGNOSIS — K86.1 CHRONIC PANCREATITIS, UNSPECIFIED PANCREATITIS TYPE (H): Primary | ICD-10-CM

## 2022-06-24 DIAGNOSIS — G89.29 CHRONIC ABDOMINAL PAIN: ICD-10-CM

## 2022-06-24 DIAGNOSIS — R10.9 CHRONIC ABDOMINAL PAIN: ICD-10-CM

## 2022-06-24 PROCEDURE — 97803 MED NUTRITION INDIV SUBSEQ: CPT | Mod: 95 | Performed by: DIETITIAN, REGISTERED

## 2022-06-24 NOTE — PROGRESS NOTES
Medical Nutrition Therapy  Visit Type: Reassessment and intervention    Visit Details    How would you like to obtain your AVS? MyChart  If the correspondence for visit is dropped, how would you like your dietitian to reconnect with you:   call back by phone? Yes    Text to cell phone: [phone]   Will anyone else be joining your video visit or telephone call? No  {If patient encounters technical issues they should call [phone]    Type of service:  Video Visit   Start Time: 12:32PM    End Time: 1:05 PM    Originating Location (pt. Location): Home    Distant Location (provider location):  [facility] WORKING VIRTUAL FROM HOME     Platform used for Video Visit: Shaneka      Referring Provider: Carla Boyd  [facility]     REASON FOR REFERRAL:   Alexandra Nunez is a 41 year old female who is interested in Medical Nutrition Therapy (MNT) and education related to GI issues - constipation, gas, bloating, Abdominal Pain and pancreatitis.   She is accompanied by self.     Changes since previous consult Yes        Behavior Status:Improvement shown  Barriers Include:Lack of nutrition knowlege and digestion/abdominal pain     She has been doing a smoothie adding in pineapple, almond milk and couple scoops of collagen protein powder. She started with just 4oz. She seems to be tolerating them ok. She has a slight amount of pain on the right side under rib. Which is where she usually has pain. She has is open to trying to do for lunch because if she doesn't get hungry she tries protein drinks pre made. I provided few suggestions with lower allergen brands to try both dairy/gluten/soy free. She feels she is sensitive to the dairy so we meal planned a few ideas that had base taht is more dairy free. Recommend to follow more low fat healthy options and slowly increase.     Discussed putting in more purred foods that are easier to digest. Suggested to try some soups with softer foods such  as sweet potato. She wants to work on slowly increasing foods. She is a little nervious about adding in foods. She went on a enzyme in the past and was suppose to get more education on it. Suggested that we try a digestive enzyme. Only have a BM maybe once per week. Didn't see much of a change since starting the smoothies about 6 days. We worked on trying some pineapple with natural digestive enzymes to see if that helps with the pain. Also suggested papaya for smoothie.       NUTRITION ASSESSMENT:   No flowsheet data found.     Neurological 6/14/2022   Migraine Headaches Past;Current   Tension Headache Past;Current       No flowsheet data found.   No flowsheet data found.   Gastrointestinal 6/14/2022   Constipation Past;Current   Nausea or Vomiting Past   Gas/bloating Past;Current   Gallstones Past   Blood in stool Past;Current   Pancreatitis Past;Current   Abdominal Pain Past;Current       No flowsheet data found.   Endocrine 6/14/2022   Hair thinning/loss Past      Skin 6/14/2022   Eczema Past;Current      No flowsheet data found.   No flowsheet data found.   Psychological 6/14/2022   Depression/Anxiety Past;Current   Anorexia Past      No flowsheet data found.   Womens Health Assessment 6/14/2022   Hysterectomy No   I am pregnant.  No   I am breastfeeding. No   I want to become pregnant within the next year . No   What do you use for contraception?  not needed/not sexually active   Any problems with current birth control method?   No   Are your periods irregular? Yes   Irregular periods Missed months   Are you officially in menopause? (no period for one year or longer)  No        Past Medical History:  Past Medical History:   Diagnosis Date     Pacemaker        Previous Surgeries:   Past Surgical History:   Procedure Laterality Date     INJECT NERVE BLOCK CELIAC PLEXUS Bilateral 3/18/2022    Procedure: Celiac Plexus Block;  Surgeon: Carla Boyd MD;  Location: [facility] [place] OR     IR SINOGRAM INJECTION DIAGNOSTIC   7/12/2021     IR SINOGRAM INJECTION DIAGNOSTIC  7/22/2021     IR SINOGRAM INJECTION DIAGNOSTIC  8/12/2021     IR SINOGRAM INJECTION DIAGNOSTIC  8/25/2021     IR SINOGRAM INJECTION DIAGNOSTIC  9/22/2021     IR SINOGRAM INJECTION DIAGNOSTIC  8/19/2021     IR SINOGRAM INJECTION THERAPEUTIC  8/5/2021     IR SINOGRAM INJECTION THERAPEUTIC  9/8/2021        Family History:  No family history on file.     Lifestyle History:  Lifestyle 6/14/2022   Do you feel your life is stressful right now?  Yes   What is the cause(s) of stress in your life?  Over thinking and too much analysis;Taking care of parents, children or other family member   Are you currently implementing any strategies to help manage stress? Yes   What are you doing to manage stress?  Meditation;Breathing exercises;Affirmations;Acupuncture        Exercise History:  Exercise 6/14/2022   Does your occupation require extended periods of sitting?  Yes   Does your occupation require extended periods of repetitive movements (ex: walking or lifting)?  No   Do you currently participate in any forms of exercise? No   Rate your level of motivation for including exercise in your routine (0=none, 10=high): 5   Do you have any medical conditions, pain, injuries, surgeries etc. restricting you from exercise? Yes        Sleep History:  Sleep 6/14/2022   How many hours (on average) do you sleep per night? Less than 4   What time do you turn off the lights? 10 PM   How long does it take for you to fall asleep? 30-45 mins   What time do you stop using electronic devices? 8 PM   What time do you wake up? 5 AM   When do you eat your first meal?  8 AM   Do you feel well-rested during the day?  No   Do you take naps?  No   Do you have a comfortable bedroom environment (cool, quiet, dark, etc)? Yes   Do you have a sleep routine/ ritual that you do before bed?  Yes   How many hours do you spend per day looking at a screen (TV, computer, tablet and phone)? 0 to 2   Select all factors  that apply to your current sleep habits: Difficulty falling asleep;Wake up in the middle of the night;Not hungry in the morning        Nutrition History:  Nutrition 6/14/2022   Have you ever had a nutrition consultation? Yes   Do you currently follow a special diet or nutritional program? No   What do you feel are the biggest barriers getting in the way of achieving you nutritional goals? Other   Do you have any food allergies, sensitivities or intolerances?  Yes   Specific food(s) causing adverse reactions Gluten       Digestion 6/14/2022   Do you experience stomach pains/cramping? Daily   Do you experience bloating?  Daily   Do you experience gas?  Daily   Do you experience heartburn/acid reflux/indigestion? Rarely   How often do you have a bowel movement? Once per week or less   What is a typical bowel movement like for you? Select all that apply: Hard to pass      Food Access:  6/14/2022   Who does the grocery shopping? Self   How often is grocery shopping done? Weekly   Where do you usually receive your groceries from? Select all that apply: [employer]   Do you read food labels? Yes   What do you look at?  Other   Who does the cooking? Select all that apply: Self   How many meals do you eat out per week?  0 to 1   What restaurants do you typically choose? Other      Daily Patterns: 6/14/2022   How many days per week do you have breakfast? 4   How many days per week do you have lunch? 4   How many days per week do you have dinner? 4   How many days per week do you have snacks? 0      Protein Intake: 6/14/2022   How many times per day do you typically consume a protein source(s)? 3   What types of protein do you currently eat?  Other Soy/Meat Alternative       Fat Intake:  6/14/2022   How many times per day do you typically consume healthy fat(s)? 0   What types of health fats do you currently eat? Select all that apply:  Peanut butter       Fruit Intake:  6/14/2022   How many times per day do you  typically consume fruits? 2   What types of fruit do currently eat? Pineapple       Vegetable Intake:  6/14/2022   How many times per day do you typically consume vegetables? 0   What types of vegetables do you currently eat? Broccoli      Grain Intake:  6/14/2022   How many times per day do you typically consume grains? 0   What types of grains do currently eat? Select all that apply:  Other (non-gluten free)       Dairy Intake:  6/14/2022   How many times per day do you typically consume dairy? 1   What types of dairy do currently eat? Select all that apply:  Yogurt       Non-Dairy Alternative Intake:  6/14/2022   How many times per day do you typically consume non-dairy alternatives? 0   What types of non-dairy alternatives do currently eat? Select all that apply:  Oat milk       Sweets Intake:  6/14/2022   How many times per day do you typically consume sweets? 0      Beverage Intake:  6/14/2022   How many 8 oz cups of water do you typically consume per day?  2 to 3   How many 8 oz cups of caffeine do you typically consume per day?  1 to 2   How many drinks of alcohol do you typically consume per week (1 drink = 5 oz wine, 12 oz beer, 1.5 oz spirits)?   0       Lifestyle Recall:  6/14/2022   What time did you wake up? 5 AM   What time did you go to sleep? 10 PM   What time did you have breakfast? 8-9 AM   Where did you have breakfast?  Work   What time did you have a morning snack? No snack   What time did you have lunch? 11AM-12 PM   Where did you have lunch?  Work   What time did you have an afternoon snack? No snack   What time did you have dinner? 6-7 PM   Where did you have dinner?  Home   What time did you have an evening snack? No Snack   What time of day did you exercise? No Exercise          Additional concerns: Never really hungry so eats sometimes gluten free becca cracker or cheese stick   Breakfast: Makes herself have a protein drink fair life drink   Lunch: fair life drink   Dinner: Egg whites  with piece of toast - yolks seem to be harder to digest     To help increase calories does crackers or cheese stick     When she was hunger she got dx with wheat intolerance before knowing more about celiac disease. She will play around with gluten free but doesn't avoid consistently. She hasn't had a lot of foods since being on the feeding tube. She was just told to eat what she can and see what affects her. She feels like everything hurts so didn't get to try various foods.     Discussed doing some more pureed foods to help with digestion and transfer to solids after tube feeding a year ago. She notices that with certain protein drinks she gets cramps. She has been doing the fair light because it seems to be easier to digest.     [email or web address]/[email or web address]    MEDICATIONS:  Current Outpatient Medications   Medication Sig Dispense Refill     albuterol (PROAIR HFA/PROVENTIL HFA/VENTOLIN HFA) 108 (90 Base) MCG/ACT inhaler Inhale 1-2 puffs into the lungs every 4 hours as needed for shortness of breath / dyspnea or wheezing       albuterol (PROVENTIL) (2.5 MG/3ML) 0.083% neb solution Take 2.5 mg by nebulization every 4 hours as needed for shortness of breath / dyspnea or wheezing       ALPRAZolam (XANAX) 0.5 MG tablet Take 0.5 mg by mouth 2 times daily as needed for anxiety       amitriptyline (ELAVIL) 25 MG tablet Take 1 tablet (25 mg) by mouth At Bedtime 30 tablet 1     fluticasone (FLOVENT HFA) 110 MCG/ACT inhaler Inhale 2 puffs into the lungs 2 times daily       ibuprofen (ADVIL/MOTRIN) 100 MG/5ML suspension Take 600 mg by mouth every 6 hours as needed for fever or moderate pain       levonorgestrel (MIRENA) 20 MCG/24HR IUD 1 each by Intrauterine route once       methocarbamol (ROBAXIN) 750 MG tablet Take 1 tablet (750 mg) by mouth 4 times daily as needed for muscle spasms 90 tablet 1     ondansetron (ZOFRAN) 4 MG tablet Take by mouth every 6 hours as needed for nausea       sodium chloride, PF,  (SALINE FLUSH) 0.9% PF flush 3 mLs by Intracatheter route every 8 hours       tiZANidine (ZANAFLEX) 2 MG tablet Take 1 tablet (2 mg) by mouth 3 times daily as needed for muscle spasms 90 tablet 1     traMADol (ULTRAM) 50 MG tablet Take 1 tablet (50 mg) by mouth 2 times daily as needed for severe pain 20 tablet 0     Vitamin D3 (CHOLECALCIFEROL) 25 mcg (1000 units) tablet Take 1 tablet by mouth daily         No flowsheet data found.      ALLERGIES:   Allergies   Allergen Reactions     Nkda [No Known Drug Allergies]         .na  LABS:  Last Basic Metabolic Panel:  Lab Results   Component Value Date     12/16/2021     06/23/2021     06/22/2021     06/21/2021      Lab Results   Component Value Date    POTASSIUM 3.8 12/16/2021    POTASSIUM 4.7 06/23/2021    POTASSIUM 4.6 06/22/2021    POTASSIUM 4.5 06/21/2021     Lab Results   Component Value Date    CHLORIDE 108 12/16/2021    CHLORIDE 99 06/23/2021    CHLORIDE 97 06/22/2021    CHLORIDE 100 06/21/2021     Lab Results   Component Value Date    MARTIN 8.9 12/16/2021    MARTIN 9.5 06/23/2021    MARTIN 9.4 06/22/2021    MARTIN 9.4 06/21/2021     Lab Results   Component Value Date    CO2 24 12/16/2021    CO2 28 06/23/2021    CO2 29 06/22/2021    CO2 29 06/21/2021     Lab Results   Component Value Date    BUN 8 12/16/2021    BUN 17 06/23/2021    BUN 15 06/22/2021    BUN 14 06/21/2021     Lab Results   Component Value Date    CR 0.49 12/16/2021    CR 0.61 06/23/2021    CR 0.52 06/22/2021    CR 0.49 06/21/2021     Lab Results   Component Value Date     12/16/2021     06/23/2021    GLC 99 06/22/2021     06/21/2021       Last Glucose Profile:   No results found for: A1C    Last Lipid Profile:   No results found for: CHOL  No results found for: HDL  No results found for: LDL  No results found for: TRIG  No results found for: CHOLHDLRATIO    Most recent CBC:  Recent Labs   Lab Test 12/16/21  1730 06/23/21  0744 06/22/21  0724   WBC 5.2 6.1 7.7   HGB  12.0 8.3* 8.3*   HCT 35.6 28.1* 27.5*    540* 566*     Most recent hepatic panel:  Recent Labs   Lab Test 12/16/21  1729 06/23/21  0744   ALT 18 27   AST 12 23     Most recent creatinine:  Recent Labs   Lab Test 12/16/21  1729 06/23/21  0744   CR 0.49* 0.61       No components found for: GFRESETIMATEDLASTLAB(gfrestblack:1@  Lab Results   Component Value Date    ALBUMIN 4.1 12/16/2021    ALBUMIN 2.6 06/23/2021       Last Thyroid Profile:   No results found for: TSH, T4    Last Mineral Profile:   No results found for: MELISSA, IRON, FEB    Autoimmune & Inflammatory   CRP Inflammation   Date Value Ref Range Status   06/23/2021 26.0 (H) 0.0 - 8.0 mg/L Final         Last Vitamin Profile:   No results found for: GAK896, GZUP314, DITD83NTAZV, VITD3, D2VIT, D3VIT, DTOT, [id number], [id number], [id number], [id number], [id number], [id number]    ANTHROPOMETRICS:  Vitals:   BP Readings from Last 1 Encounters:   06/09/22 129/79     Pulse Readings from Last 1 Encounters:   06/09/22 77     Estimated body mass index is 23.06 kg/m  as calculated from the following:    Height as of 3/18/22: 1.829 m (6').    Weight as of 3/18/22: 77.1 kg (170 lb).    Wt Readings from Last 5 Encounters:   03/18/22 77.1 kg (170 lb)   09/08/21 88.5 kg (195 lb)   07/26/21 90.7 kg (200 lb)   07/22/21 90.7 kg (200 lb)   07/19/21 90.7 kg (200 lb)     NUTRITION DIAGNOSIS:    1. Altered GI function related to potential food sensitives/intolerances as evidenced by abdominal pain, gas, bloating and constipation when trying to implement foods after tube feeding.     2.  Inadequate oral intake related to nausea and vomiting as evidenced by food recall/patient statement of poor appetite due to pancreatitis.       NUTRITION INTERVENTION:    Long Term Goals:   Goal: Increase ability to tolerate solid foods   Goal: Decrease digestive symptoms -constipation, gas, bloating and stomach aches  Goal: Decrease potential nutrient deficiencies        Short Term  Goals:  Goal 1: Some improvement Shown: Focus on starting smoothie for breakfast - see the recipe provided in handouts and book below under resources - add in unsweetened flax, hemp or coconut milk or filtered water to start, 1 tsp- 1 tbsp of healthy fat such as flax seed ground or yvette seed ground (slowly introduce fiber as tolerated).1 scoop of plant based protein powder or collagen powder, 1 serving of lowfodmap fruit and veggie such as kale or, spinach        Smoothie Recipes to Kickoff Your Success!!     Pineapple Coconut Smoothie      Makes 1-2 serving     1-2 cup unsweetened coconut milk   1 scoop jimenez vanilla plant based protein powder or collagen protein powder by vital proteins (great for just having no flavor - plain)   1 cup frozen pineapple chunks   1 handful spinach (optional)     banana (optional - if added use less pineapple so not as high of sugar content)   1 tbsp shredded unsweetend coconut to top (optional)  1 tbsp ground flax seed (optional)     Method:     In  (recommend vitamix or blend tech) add dairy free alternative milk or filtered water. Add in the rest of ingredients blend on high for 2 mins. If desire thinner consistency add in the water or dairy alternative. Enjoy :)    Kiwi Spirulina Smoothie      Makes 1 servings     1 cup unsweetened macadamia nut milk or filtered water   1 scoop jimenez vanilla plant based protein powder or 1-2 scoops collagen powder from vital proteins (plain)   1-2 Kiwi s peeled   1 handful cilantro   1 lime juiced    - 1 avocado   2 tsp spirulina   1 inch jeimy fresh pilled      Method:     In  (recommend vitamix or blend tech) add dairy free alternative milk or filtered water. Add in the rest of ingredients blend on high for 2 mins. If desire thinner consistency add in the water or dairy alternative. Enjoy :)     Green Detox Smoothie      Makes 1 servings     1 cup unsweetened macadamia nut milk or filtered water (I like filtered water more for this  recipe)  1-2 scoops collagen powder from vital proteins (plain)   1 small granny nuñez apple (prefer organic - lower pesticides and endocrine disruptors)   1 handful cilantro   1 lime juiced    - 1 avocado (optional)   1 cucumber      Method:     In  (recommend vitamix or blend tech) add dairy free alternative milk or filtered water. Add in the rest of ingredients blend on high for 2 mins. If desire thinner consistency add in the water or dairy alternative. Enjoy :)        Spirulina Banana Smoothie      Makes 1 serving     1-2 cup unsweetened macadamia nut milk or filtered water (I like filtered water more for this recipe)  1 scoop jimenez vanilla plant based protein powder     banana   1 handful spinach    - 1 avocado  1-2 tsp spirulina    1 tbsp ground flax seed      Method:     In  (recommend vitamix or blend tech) add dairy free alternative milk or filtered water. Add in the rest of ingredients blend on high for 2 mins. If desire thinner consistency add in the water or dairy alternative. Enjoy :)    Banana Date Split Smoothie      Makes 1-2 servings     1-2 cup unsweetened almond/macadamia nut milk or filtered water (I like filtered water more for this recipe)  1 scoop jimenez chocolate plant based protein powder or collagen protein powder     -1  banana   1 handful spinach    - 1 avocado  1 -3 tsp cinnamon   1 tbsp yvette seeds   1-2 dates (optional)        Method:     In  (recommend vitamix or blend tech) add dairy free alternative milk or filtered water. Add in the rest of ingredients blend on high for 2 mins. If desire thinner consistency add in the water or dairy alternative. Enjoy :)     Berry Blast Smoothie      Makes 1 serving     1-2 cup unsweetened macadamia nut milk or filtered water (I like filtered water more for this recipe)  1 scoop jimenez vanilla plant based protein powder or collagen protein powder by vital proteins (plain)   1 cup mixed berries    1 handful spinach    - 1  avocado  1-2 tsp raw organic maqui berry powder by Sunfood superfoods (optional but very immune boosting and jammed with phytonutrients)   1 tbsp ground flax seed      Method:     In  (recommend vitamix or blend tech) add dairy free alternative milk or filtered water. Add in the rest of ingredients blend on high for 2 mins. If desire thinner consistency add in the water or dairy alternative. Enjoy :)     Superfood + Immune Boosting Powders:  Maqui Berry Powder   Turmeric Powder   Spirulina Powder   Glutamine Powder   Matcha Powder   Kristina fresh or powdered   Kale, dandelion greens or spinach         Omega 3 and Protein Desired Toppings:   Add some healthy protein and omega 3 packed seeds for an extra special nutrient packed topping and a little extra crunch!   1 tsp -1 tbps ground flax seed   1 tsp -1 tbsp hemp seeds               1 tsp-1 tbsp yvette seeds     Also, check out some of the other breakfast ideas provided such as oatmeal (naturally gluten free) with dairy free alternative milk, flax seed, nuts such as walnuts, fruit such as berries and protein like eggs or chicken/turkey sausage.     Avocado on slice of gluten free bread recommend PiCloud brand      https://KeepTruckin.[email or web address]     Birch chapman brand for pancakes - paleo  https://Islet Sciences/collections/pancake-waffle-mix/products/paleo     Try siete soft taco shells with eggs and veggies in am for breakfast   https://[employer]Burnett.[employer]/collections/tortillas/products/pvpelz-rdluy-vugnvkvwu-6-pack        Goal 2:  Make sure to eat some snacks free of most reactive foods with balanced protein, fiber lower carb, healthy fats least 1-2 snacks daily around 10am and 3pm. Trying to incorporate more soft foods to start to help with better digestion.   Eating every three to four hours will help keep your insulin and blood sugar normal to help with weight  loss  - see meal plan and recipe ideas in the cardi metabolic guides provided.     Some Examples:   Beef Jerky with veggie or fruit   https://Buzzeroe.net/recipe/3-ingredient-yvette-pudding/     [place] Chips or peeled cucumber with hummus      Hummus with veggie      Fruit with nut butter - banana with sunflower seed butter or applesauce with almond butter         Goal 3:  Track what you eat. Writing down or tracking through an serena what you eat as well as how you feel and help you identify patterns in your symptoms. This can help you become more aware and create a diet that is right for you.    Pick a food tracking serena:          Through the [employer] serena, you can track symptoms, bowel movements, medications, stress, exercise, sleep and foods as well as beverages to become more mindful. [email or web address]://[email or web address]    **track by paper if serena feels like too much. Make list of foods that cause symptoms to provide and review at follow up. I recommend the dailygreatness journal as you can track your goals, mindset, foods etc to keep you on track and motivated.     Goal 4: Stat to reintroduce foods as tolerated:   Try to increase soups that are more anti-inflammatory and lower in fat. Adjust fat and fiber needs as tolerated.    Goal 5: Take supplements to address potential deficiencies:   1. Start multivitamin daily by pure encapsulations ONE MULTI - take 1 capsule with food.   2. Start the digestive enzyme 1 capsule with food as tolerated and increase to two capsules as needed.   Pure Encapsulations Digestive Enzymes Ultra  Supplement to Aid in Breaking Down Fats, Proteins, and Carbohydrates for Digestion*  180 Capsules      What is Pancreatitis?    Your pancreas helps you regulate the way that your body processes sugar. It also serves an important function in releasing enzymes and helping you digest food.    When your pancreas becomes swollen or inflamed, it cannot perform its function. This condition is  called pancreatitis.    Because the pancreas is so closely tied to your digestive process, it s affected by what you choose to eat. In cases of acute pancreatitis, pancreas inflammation is often triggered by gallstones.    But in cases of chronic pancreatitis, in which flare-ups recur over time, your diet might have a lot to do with the problem. Researchers are finding out more about foods you can eat to protect and even help to heal your pancreas.    What to eat if you have pancreatitis  To get your pancreas healthy, focus on foods that are rich in protein, low in animal fats, and contain antioxidants. Try lean meats, beans and lentils, clear soups, and dairy alternatives (such as flax milk and almond milk). Your pancreas won t have to work as hard to process these.    Research suggests that some people with pancreatitis can tolerate up to 30 to 40% of calories from fat when it s from whole-food plant sources or medium-chain triglycerides (MCTs). Others do better with much lower fat intake, such as 50 grams or less per day.    Spinach, blueberries, cherries, and whole grains can work to protect your digestion and fight the free radicals that damage your organs.    If you re craving something sweet, reach for fruit instead of added sugars since those with pancreatitis are at high risk for diabetes.    Consider cherry tomatoes, cucumbers and hummus, and fruit as your go-to snacks. Your pancreas will thank you.      What not to eat if you have pancreatitis  Foods to limit include (check out the IFM elimination guide provided for meal plan with recipes and guide for avoiding common reactive foods):       red meat    organ meats    fried foods    fries and potato chips    mayonnaise    margarine and butter    full-fat dairy    pastries and desserts with added sugars    beverages with added sugars    If you re trying to combat pancreatitis, avoid trans-fatty acids in your diet.    Fried or heavily processed foods, like  french fries and fast-food hamburgers, are some of the worst offenders. Organ meats, full-fat dairy, potato chips, and mayonnaise also top the list of foods to limit.    Cooked or deep-fried foods might trigger a flare-up of pancreatitis. You ll also want to cut back on the refined flour found in cakes, pastries, and cookies. These foods can tax the digestive system by causing your insulin levels to spike.    Pancreatitis recovery diet  If you re recovering from acute or chronic pancreatitis, avoid drinking alcohol. If you smoke, you ll also need to quit. Focus on eating a low-fat diet that won t tax or inflame your pancreas.    You should also stay hydrated. Keep an electrolyte beverage or a bottle of water with you at all times. You can even try some coconut water (look for non added sugar as it naturally is already high in sugar)    People with chronic pancreatitis often experience malnutrition due to their decreased pancreas function. Vitamins A, D, E, and K are most commonly found to be lacking as a result of pancreatitis. I recommend that we start taking pure encapsulations ONE Multi to provide some nutrients in just one capsule.     Diet tips  Always check with your doctor or dietician before changing your eating habits when you have pancreatitis. Here are some tips they might suggest:      Eat between six and eight small meals throughout the day to help recover from pancreatitis. This is easier on your digestive system than eating two or three large meals.    Use MCTs as your primary fat since this type of fat does not require pancreatic enzymes to be digested. MCTs can be found in coconut oil and palm kernel oil and is available at most health food stores.    Avoid eating too much fiber at once, as this can slow digestion and result in less-than-ideal absorption of nutrients from food. Fiber may also make your limited amount of enzymes less effective.    Take a multivitamin supplement to ensure that you re  getting the nutrition you need.       Avoid Common Trigger foods. Dairy tops the list. The proteins like casein and whey in dairy can irritate and inflame your gut, while gluten the protein in wheat, rye and barley is a close second. Give those foods up for at least 3-6 months and see if digestion, blood sugars, weight and overall health improve.      REINTRODUCE    Eat real anti-inflammatory foods. When you eat whole, real foods in their unprocessed forms, you take the first step to healing our gut and overall health. Eat plenty of vegetables, fruits, non-gluten whole grains (monitor for symptoms), beans, nuts, seeds, lean meats, healthy fats and other plant foods.    Start following reintroduction phase (see guide for details) of elimination diet to see if certain foods trigger symptoms. If you are avoiding FODMAPS focus on keeping out wheat and gluten and then add in cashew butter or high fodmap nuts 1 serving at a time to see if this high fodmap food for example gives you issues. The same can be done for nightshades. Tracking your foods and symptoms can be beneficial in helping you identify patterns while becoming more aware of what you eat.    Focus on Reintroduction Diet: Introduce foods only one food at a time for one day, followed by a 24 hour observation period. I like to focus on keeping the food out for at least 3-4 days and monitor symptoms. If no reactions occur, add in another food.  It's important to wait till symptoms subside before you add in another food to help you better pinpoint a trigger food.      Food plan - 1,400-1,800calories per day          Protein 9-10 servings per day - include at each meal to stabilize blood sugars  (Choose 3oz or 21g per meal and aim for 1oz of 7g for snacks)  -       Strive for 1-2 servings of fish per week especially of higher omega-3 fatty acid containing fish such as salmon.          Legumes 1-2 serving per day (monitor for digestive issues try hummus first)           Dairy alternatives 1-2 serving per day          nuts and seeds 3-4 servings per day - great to incorporate as snacks - nut butters/seed butters and or powders and or sprouted nuts/seeds may be easier to digest and experiment with          Fats and oils 4 servings per day          Non starchy vegetables 7-8 servings per day          Starchy vegetable limit 1 serving per day as they tend to impact blood sugar (they are moderate-GI).          Fruits 2 servings per day - best to couple with a little bit of protein or fat to offset a rise in blood sugars (they are low-moderate-GI foods and monitor if high fodmap are harder to tolerate or trigger symptoms)          Whole grains 1-2 serving per day - try gluten free whole grains instead (focus on cutting back on wheat to see if this higher fodmap food triggers symptoms)       Incorporate protein powder daily:          Plant based hemp (recommended brands: [place], Nutiva, Just Hemp Protein, Adelfo[employer] Red Mill)           Plant based pea (recommended brands: Naked Pea, Now Sports). If you want to try a combo of pea and hemp the brand Castano in vanilla or chocolate is a great option.          Try Bone broth protein powder or collagen peptides in liquid bone broth, vegetable broth or 12 oz of water as snack. The bone broth powder and collagen can be used for soups as well. This can help provide essential amino acids and minerals that heal your gut as well as balance blood sugars. A great option if you have a hard time tolerating solids.    Can also consider pre made shakes if unable to make smoothies.      Brand examples that are gluten and dairy free to try:   1) Orgain Vegan Protein Shakes, 20g of Plant Based Protein, Creamy Chocolate - Gluten Free, No Dairy, Soy, or Preservatives, No Added Sugar  2) Pirq, Vegan Protein Shake, Turmeric Curcumin, Brie, Plant-Based Protein Drink, Gluten-Free, Dairy-Free, Soy-Free, Non-GMO, Vegetarian, Kosher, Keto, Low Carb, Low  Calorie  3) OWYN Pro Elite Vegan Plant-Based High Protein Shake, 35g Protein, 9 Amino Acids, Omega-3, Prebiotics, Superfoods Greens for Workout and Recovery, 0g Net Carbs, Zero Sugar, Keto  4) Ripple Vegan Protein Shake  Chocolate  20g Nutritious Plant Based Pea Protein  Shelf Stable  No GMOs, Soy, Nut, Gluten, Lactose  5) [employer] Sports Glucose Support 1.2 Plant based, dairy free, low glycemic index  [email or web address]/products/glucose-support-1-2-vanilla    Choose Low Glycemic (GI) foods: Regulate your sugar levels by eating foods that do not spike blood sugars.  Eat low -GI foods so only small fluctuations in blood glucose and insulin levels are produced.     Examples of low-GI foods: nuts, seeds, GF oats, most vegetables especially non-starchy and fruits.    Medium or high-GI foods should be eaten with a protein or fat, both of which blunt the glycemic effect of these foods. This reduces the overall glycemic impact of a meal.  Ex: Most grains and starchy veggies are medium/high GI.    Avoid foods containing refined sugars, artificial sweeteners, and refined grains they are considered high-GI because they lead to sharp increases in blood sugars levels, which increase insulin sensitivity causing increased TG, and low good cholesterol (HDL).  Ex: cakes, cookies, pies, bread, sodas, fruit drinks, presweetened tea, coffee drinks, energy or sport drinks, flavored milk and other processed foods.      Drink more water. Hydration is critical, so drink at least six to eight glasses of water a day. Drink more water between meals and less at meals.     Alcohol and caffeine can stimulate your intestines, which may cause diarrhea. Artificial sweeteners that contain sugar alcohols such as sorbitol, mannitol and xylitol may cause diarrhea too. Carbonated drinks can produce gas.    Fiber draws water from your body to move foods through your intestine. Without enough  water and fluids, you may become constipated.    Try  adding herbal teas (sugar free) or lemon/lime/cucumber/fruit to water for flavor. Avoid artificial sweetener packets to flavor your water.    Cut back on coffee switch to green tea. Avoid adding sugar and milk to coffee instead use dairy alternatives such as almond, flax, coconut milk.Try another coffee substitute such as   -https://Mik[employer] Odeme | [employer]/  -https://[employer]      Choose foods high in fiber: Aim for at least 5 grams of fiber per serving of food or a total of 25-35 grams fiber per day. Remember, when looking at the label, you can take the fiber away from the total carbs. Ex:15g of total carbs - 4g of fiber = 11g net carbs    Insoluble fiber acts like a bulky  inner broom,  sweeping out debris from the intestine and creating more motility and movement.     Soluble fiber attracts water and swells, creating a gel that slows digestion.  Also, slows the release of glucose from foods into the blood which stops spikes in blood sugar levels.  Soluble fiber traps toxins in the gut, helping to carry them to excretion and provides healthy bacteria in the digestive tract.        NUTRITION RESOURCES:          IFM Elimination Diet - Recipes, guide, meal plan, supplement handouts  Book recommendations:    1. PANCREATITIS RELIEF SMOOTHIES: COMPREHENSIVE GUIDE PLUS SMOOTHIES RECIPES TO RELIEF PANCREATITIS FOR HEALTHY LIVING by  LILLIAN ROJO PH.D  2. Pancreatitis Cookbook: The Ultimate Pancreatitis Guide with More Than 120 Easy & Delicious Pancreatitis Diet Recipes to Improve Your Enzymes and Health. 21 Day Pancreatic Meal Plan Included.by by Darlene Kennedy   3. The Healing Soup Cookbook: Hearty Recipes to Boost Immunity and Restore Health by by LEATHA Pickard, MS, RD         PATIENT'S BEHAVIOR CHANGE GOALS:   See nutrition intervention for patient stated behavior change goals. AVS was printed and given to patient at today's appointment.    MONITOR / EVALUATE:  Registered Dietitian will monitor/evaluate the following:      Beliefs and attitudes related to food    Food and nutrition knowledge / skills    Food / Beverage / Nutrient intake     Pertinent Labs    Progress toward meeting stated nutrition-related goals    Readiness to change nutrition-related behaviors    Weight change    Digestion     COORDINATION OF CARE:  Follow up with referring provider as needed       FOLLOW-UP:  Follow-up appointment scheduled on July 5th at 10am virtual visit.     Time spent in minutes: 30 minutes  units   Encounter: Individual    Ailyn Mattson RD, CLT, LD  Integrative Registered Dietitian

## 2022-06-28 NOTE — PATIENT INSTRUCTIONS
NUTRITION INTERVENTION:    Long Term Goals:   Goal: Increase ability to tolerate solid foods   Goal: Decrease digestive symptoms -constipation, gas, bloating and stomach aches  Goal: Decrease potential nutrient deficiencies        Short Term Goals:  Goal 1: Some improvement Shown: Focus on starting smoothie for breakfast - see the recipe provided in handouts and book below under resources - add in unsweetened flax, hemp or coconut milk or filtered water to start, 1 tsp- 1 tbsp of healthy fat such as flax seed ground or yvette seed ground (slowly introduce fiber as tolerated).1 scoop of plant based protein powder or collagen powder, 1 serving of lowfodmap fruit and veggie such as kale or, spinach        Smoothie Recipes to Kickoff Your Success!!     Pineapple Coconut Smoothie      Makes 1-2 serving     1-2 cup unsweetened coconut milk   1 scoop jimenez vanilla plant based protein powder or collagen protein powder by vital proteins (great for just having no flavor - plain)   1 cup frozen pineapple chunks   1 handful spinach (optional)     banana (optional - if added use less pineapple so not as high of sugar content)   1 tbsp shredded unsweetend coconut to top (optional)  1 tbsp ground flax seed (optional)     Method:     In  (recommend vitamix or blend tech) add dairy free alternative milk or filtered water. Add in the rest of ingredients blend on high for 2 mins. If desire thinner consistency add in the water or dairy alternative. Enjoy :)    Kiwi Spirulina Smoothie      Makes 1 servings     1 cup unsweetened macadamia nut milk or filtered water   1 scoop jimenez vanilla plant based protein powder or 1-2 scoops collagen powder from vital proteins (plain)   1-2 Kiwi s peeled   1 handful cilantro   1 lime juiced    - 1 avocado   2 tsp spirulina   1 inch jeimy fresh pilled      Method:     In  (recommend vitamix or blend tech) add dairy free alternative milk or filtered water. Add in the rest of ingredients  blend on high for 2 mins. If desire thinner consistency add in the water or dairy alternative. Enjoy :)     Green Detox Smoothie      Makes 1 servings     1 cup unsweetened macadamia nut milk or filtered water (I like filtered water more for this recipe)  1-2 scoops collagen powder from vital proteins (plain)   1 small granny nuñez apple (prefer organic - lower pesticides and endocrine disruptors)   1 handful cilantro   1 lime juiced    - 1 avocado (optional)   1 cucumber      Method:     In  (recommend vitamix or blend tech) add dairy free alternative milk or filtered water. Add in the rest of ingredients blend on high for 2 mins. If desire thinner consistency add in the water or dairy alternative. Enjoy :)        Spirulina Banana Smoothie      Makes 1 serving     1-2 cup unsweetened macadamia nut milk or filtered water (I like filtered water more for this recipe)  1 scoop jimenez vanilla plant based protein powder     banana   1 handful spinach    - 1 avocado  1-2 tsp spirulina    1 tbsp ground flax seed      Method:     In  (recommend vitamix or blend tech) add dairy free alternative milk or filtered water. Add in the rest of ingredients blend on high for 2 mins. If desire thinner consistency add in the water or dairy alternative. Enjoy :)    Banana Date Split Smoothie      Makes 1-2 servings     1-2 cup unsweetened almond/macadamia nut milk or filtered water (I like filtered water more for this recipe)  1 scoop jimenez chocolate plant based protein powder or collagen protein powder     -1  banana   1 handful spinach    - 1 avocado  1 -3 tsp cinnamon   1 tbsp yvette seeds   1-2 dates (optional)        Method:     In  (recommend vitamix or blend tech) add dairy free alternative milk or filtered water. Add in the rest of ingredients blend on high for 2 mins. If desire thinner consistency add in the water or dairy alternative. Enjoy :)     Berry Blast Smoothie      Makes 1 serving     1-2 cup  unsweetened macadamia nut milk or filtered water (I like filtered water more for this recipe)  1 scoop jimenez vanilla plant based protein powder or collagen protein powder by vital proteins (plain)   1 cup mixed berries    1 handful spinach    - 1 avocado  1-2 tsp raw organic maqui berry powder by Sunfood superfoods (optional but very immune boosting and jammed with phytonutrients)   1 tbsp ground flax seed      Method:     In  (recommend vitamix or blend tech) add dairy free alternative milk or filtered water. Add in the rest of ingredients blend on high for 2 mins. If desire thinner consistency add in the water or dairy alternative. Enjoy :)     Superfood + Immune Boosting Powders:  Maqui Berry Powder   Turmeric Powder   Spirulina Powder   Glutamine Powder   Matcha Powder   Kristina fresh or powdered   Kale, dandelion greens or spinach         Omega 3 and Protein Desired Toppings:   Add some healthy protein and omega 3 packed seeds for an extra special nutrient packed topping and a little extra crunch!   1 tsp -1 tbps ground flax seed   1 tsp -1 tbsp hemp seeds               1 tsp-1 tbsp yvette seeds     Also, check out some of the other breakfast ideas provided such as oatmeal (naturally gluten free) with dairy free alternative milk, flax seed, nuts such as walnuts, fruit such as berries and protein like eggs or chicken/turkey sausage.     Avocado on slice of gluten free bread recommend [employer] brand      https://"[employer]/?[id number]     Birch chapman brand for pancakes - paleo  https://[employer]/collections/pancake-waffle-mix/products/paleo     Try siete soft taco shells with eggs and veggies in am for breakfast   https://[employer]k/collections/tortillas/products/gpnrig-mfhdl-pmnpiqucv-6-pack        Goal 2:  Make sure to eat some snacks free of most reactive foods with balanced protein, fiber lower  carb, healthy fats least 1-2 snacks daily around 10am and 3pm. Trying to incorporate more soft foods to start to help with better digestion.   Eating every three to four hours will help keep your insulin and blood sugar normal to help with weight loss  - see meal plan and recipe ideas in the cardi metabolic guides provided.     Some Examples:   Beef Jerky with veggie or fruit   https://[employer]/recipe/3-ingredient-yvette-pudding/     [place] Chips or peeled cucumber with hummus      Hummus with veggie      Fruit with nut butter - banana with sunflower seed butter or applesauce with almond butter         Goal 3:  Track what you eat. Writing down or tracking through an serena what you eat as well as how you feel and help you identify patterns in your symptoms. This can help you become more aware and create a diet that is right for you.    Pick a food tracking serena:         Through the [employer] serena, you can track symptoms, bowel movements, medications, stress, exercise, sleep and foods as well as beverages to become more mindful. [email or web address]    **track by paper if serena feels like too much. Make list of foods that cause symptoms to provide and review at follow up. I recommend the dailygreatness journal as you can track your goals, mindset, foods etc to keep you on track and motivated.     Goal 4: Stat to reintroduce foods as tolerated:   Try to increase soups that are more anti-inflammatory and lower in fat. Adjust fat and fiber needs as tolerated.    Goal 5: Take supplements to address potential deficiencies:   1. Start multivitamin daily by pure encapsulations ONE MULTI - take 1 capsule with food.   2. Start the digestive enzyme 1 capsule with food as tolerated and increase to two capsules as needed.   Pure Encapsulations Digestive Enzymes Ultra  Supplement to Aid in Breaking Down Fats, Proteins, and Carbohydrates for Digestion*  180 Capsules      What is Pancreatitis?    Your pancreas  helps you regulate the way that your body processes sugar. It also serves an important function in releasing enzymes and helping you digest food.    When your pancreas becomes swollen or inflamed, it cannot perform its function. This condition is called pancreatitis.    Because the pancreas is so closely tied to your digestive process, it s affected by what you choose to eat. In cases of acute pancreatitis, pancreas inflammation is often triggered by gallstones.    But in cases of chronic pancreatitis, in which flare-ups recur over time, your diet might have a lot to do with the problem. Researchers are finding out more about foods you can eat to protect and even help to heal your pancreas.    What to eat if you have pancreatitis  To get your pancreas healthy, focus on foods that are rich in protein, low in animal fats, and contain antioxidants. Try lean meats, beans and lentils, clear soups, and dairy alternatives (such as flax milk and almond milk). Your pancreas won t have to work as hard to process these.    Research suggests that some people with pancreatitis can tolerate up to 30 to 40% of calories from fat when it s from whole-food plant sources or medium-chain triglycerides (MCTs). Others do better with much lower fat intake, such as 50 grams or less per day.    Spinach, blueberries, cherries, and whole grains can work to protect your digestion and fight the free radicals that damage your organs.    If you re craving something sweet, reach for fruit instead of added sugars since those with pancreatitis are at high risk for diabetes.    Consider cherry tomatoes, cucumbers and hummus, and fruit as your go-to snacks. Your pancreas will thank you.      What not to eat if you have pancreatitis  Foods to limit include (check out the IFM elimination guide provided for meal plan with recipes and guide for avoiding common reactive foods):     red meat  organ meats  fried foods  fries and potato  chips  mayonnaise  margarine and butter  full-fat dairy  pastries and desserts with added sugars  beverages with added sugars  If you re trying to combat pancreatitis, avoid trans-fatty acids in your diet.    Fried or heavily processed foods, like french fries and fast-food hamburgers, are some of the worst offenders. Organ meats, full-fat dairy, potato chips, and mayonnaise also top the list of foods to limit.    Cooked or deep-fried foods might trigger a flare-up of pancreatitis. You ll also want to cut back on the refined flour found in cakes, pastries, and cookies. These foods can tax the digestive system by causing your insulin levels to spike.    Pancreatitis recovery diet  If you re recovering from acute or chronic pancreatitis, avoid drinking alcohol. If you smoke, you ll also need to quit. Focus on eating a low-fat diet that won t tax or inflame your pancreas.    You should also stay hydrated. Keep an electrolyte beverage or a bottle of water with you at all times. You can even try some coconut water (look for non added sugar as it naturally is already high in sugar)    People with chronic pancreatitis often experience malnutrition due to their decreased pancreas function. Vitamins A, D, E, and K are most commonly found to be lacking as a result of pancreatitis. I recommend that we start taking pure encapsulations ONE Multi to provide some nutrients in just one capsule.     Diet tips  Always check with your doctor or dietician before changing your eating habits when you have pancreatitis. Here are some tips they might suggest:    Eat between six and eight small meals throughout the day to help recover from pancreatitis. This is easier on your digestive system than eating two or three large meals.  Use MCTs as your primary fat since this type of fat does not require pancreatic enzymes to be digested. MCTs can be found in coconut oil and palm kernel oil and is available at most health food stores.  Avoid  eating too much fiber at once, as this can slow digestion and result in less-than-ideal absorption of nutrients from food. Fiber may also make your limited amount of enzymes less effective.  Take a multivitamin supplement to ensure that you re getting the nutrition you need.       Avoid Common Trigger foods. Dairy tops the list. The proteins like casein and whey in dairy can irritate and inflame your gut, while gluten the protein in wheat, rye and barley is a close second. Give those foods up for at least 3-6 months and see if digestion, blood sugars, weight and overall health improve.      REINTRODUCE    Eat real anti-inflammatory foods. When you eat whole, real foods in their unprocessed forms, you take the first step to healing our gut and overall health. Eat plenty of vegetables, fruits, non-gluten whole grains (monitor for symptoms), beans, nuts, seeds, lean meats, healthy fats and other plant foods.    Start following reintroduction phase (see guide for details) of elimination diet to see if certain foods trigger symptoms. If you are avoiding FODMAPS focus on keeping out wheat and gluten and then add in cashew butter or high fodmap nuts 1 serving at a time to see if this high fodmap food for example gives you issues. The same can be done for nightshades. Tracking your foods and symptoms can be beneficial in helping you identify patterns while becoming more aware of what you eat.    Focus on Reintroduction Diet: Introduce foods only one food at a time for one day, followed by a 24 hour observation period. I like to focus on keeping the food out for at least 3-4 days and monitor symptoms. If no reactions occur, add in another food.  It's important to wait till symptoms subside before you add in another food to help you better pinpoint a trigger food.      Food plan - 1,400-1,800calories per day          Protein 9-10 servings per day - include at each meal to stabilize blood sugars  (Choose 3oz or 21g per meal  and aim for 1oz of 7g for snacks)  -       Strive for 1-2 servings of fish per week especially of higher omega-3 fatty acid containing fish such as salmon.          Legumes 1-2 serving per day (monitor for digestive issues try hummus first)          Dairy alternatives 1-2 serving per day          nuts and seeds 3-4 servings per day - great to incorporate as snacks - nut butters/seed butters and or powders and or sprouted nuts/seeds may be easier to digest and experiment with          Fats and oils 4 servings per day          Non starchy vegetables 7-8 servings per day          Starchy vegetable limit 1 serving per day as they tend to impact blood sugar (they are moderate-GI).          Fruits 2 servings per day - best to couple with a little bit of protein or fat to offset a rise in blood sugars (they are low-moderate-GI foods and monitor if high fodmap are harder to tolerate or trigger symptoms)          Whole grains 1-2 serving per day - try gluten free whole grains instead (focus on cutting back on wheat to see if this higher fodmap food triggers symptoms)       Incorporate protein powder daily:         Plant based hemp (recommended brands: [employer], Nutiva, Just Hemp Protein, [employer])          Plant based pea (recommended brands: Naked Pea, Now Sports). If you want to try a combo of pea and hemp the brand Castano in vanilla or chocolate is a great option.         Try Bone broth protein powder or collagen peptides in liquid bone broth, vegetable broth or 12 oz of water as snack. The bone broth powder and collagen can be used for soups as well. This can help provide essential amino acids and minerals that heal your gut as well as balance blood sugars. A great option if you have a hard time tolerating solids.    Can also consider pre made shakes if unable to make smoothies.      Brand examples that are gluten and dairy free to try:   1) Orgain Vegan Protein Shakes, 20g of Plant Based Protein, Creamy  Chocolate - Gluten Free, No Dairy, Soy, or Preservatives, No Added Sugar  2) Pirq, Vegan Protein Shake, Turmeric Curcumin, Brie, Plant-Based Protein Drink, Gluten-Free, Dairy-Free, Soy-Free, Non-GMO, Vegetarian, Kosher, Keto, Low Carb, Low Calorie  3) OWYN Pro Elite Vegan Plant-Based High Protein Shake, 35g Protein, 9 Amino Acids, Omega-3, Prebiotics, Superfoods Greens for Workout and Recovery, 0g Net Carbs, Zero Sugar, Keto  4) Ripple Vegan Protein Shake  Chocolate  20g Nutritious Plant Based Pea Protein  Shelf Stable  No GMOs, Soy, Nut, Gluten, Lactose  5) MoneyMail Glucose Support 1.2 Plant based, dairy free, low glycemic index  https://[employer]/products/glucose-support-1-2-vanilla    Choose Low Glycemic (GI) foods: Regulate your sugar levels by eating foods that do not spike blood sugars.  Eat low -GI foods so only small fluctuations in blood glucose and insulin levels are produced.     Examples of low-GI foods: nuts, seeds, GF oats, most vegetables especially non-starchy and fruits.    Medium or high-GI foods should be eaten with a protein or fat, both of which blunt the glycemic effect of these foods. This reduces the overall glycemic impact of a meal.  Ex: Most grains and starchy veggies are medium/high GI.    Avoid foods containing refined sugars, artificial sweeteners, and refined grains they are considered high-GI because they lead to sharp increases in blood sugars levels, which increase insulin sensitivity causing increased TG, and low good cholesterol (HDL).  Ex: cakes, cookies, pies, bread, sodas, fruit drinks, presweetened tea, coffee drinks, energy or sport drinks, flavored milk and other processed foods.      Drink more water. Hydration is critical, so drink at least six to eight glasses of water a day. Drink more water between meals and less at meals.    Alcohol and caffeine can stimulate your intestines, which may cause diarrhea. Artificial sweeteners that contain sugar alcohols  such as sorbitol, mannitol and xylitol may cause diarrhea too. Carbonated drinks can produce gas.  Fiber draws water from your body to move foods through your intestine. Without enough  water and fluids, you may become constipated.  Try adding herbal teas (sugar free) or lemon/lime/cucumber/fruit to water for flavor. Avoid artificial sweetener packets to flavor your water.  Cut back on coffee switch to green tea. Avoid adding sugar and milk to coffee instead use dairy alternatives such as almond, flax, coconut milk.Try another coffee substitute such as   -https://[employer]/  -https://[employer]      Choose foods high in fiber: Aim for at least 5 grams of fiber per serving of food or a total of 25-35 grams fiber per day. Remember, when looking at the label, you can take the fiber away from the total carbs. Ex:15g of total carbs - 4g of fiber = 11g net carbs    Insoluble fiber acts like a bulky  inner broom,  sweeping out debris from the intestine and creating more motility and movement.     Soluble fiber attracts water and swells, creating a gel that slows digestion.  Also, slows the release of glucose from foods into the blood which stops spikes in blood sugar levels.  Soluble fiber traps toxins in the gut, helping to carry them to excretion and provides healthy bacteria in the digestive tract.        NUTRITION RESOURCES:         IFM Elimination Diet - Recipes, guide, meal plan, supplement handouts  Book recommendations:    1. PANCREATITIS RELIEF SMOOTHIES: COMPREHENSIVE GUIDE PLUS SMOOTHIES RECIPES TO RELIEF PANCREATITIS FOR HEALTHY LIVING by  LILLIAN ROJO PH.D  2. Pancreatitis Cookbook: The Ultimate Pancreatitis Guide with More Than 120 Easy & Delicious Pancreatitis Diet Recipes to Improve Your Enzymes and Health. 21 Day Pancreatic Meal Plan Included.by by Darlene Kennedy   3. The Healing Soup Cookbook: Hearty Recipes to Boost Immunity and Restore Health by by LEATHA Pickard, MS, RD

## 2022-07-31 ENCOUNTER — HEALTH MAINTENANCE LETTER (OUTPATIENT)
Age: 42
End: 2022-07-31

## 2022-09-12 ENCOUNTER — MYC MEDICAL ADVICE (OUTPATIENT)
Dept: ANESTHESIOLOGY | Facility: CLINIC | Age: 42
End: 2022-09-12

## 2022-09-12 DIAGNOSIS — G89.29 CHRONIC ABDOMINAL PAIN: ICD-10-CM

## 2022-09-12 DIAGNOSIS — R10.9 CHRONIC ABDOMINAL PAIN: ICD-10-CM

## 2022-09-12 DIAGNOSIS — K86.1 CHRONIC PANCREATITIS, UNSPECIFIED PANCREATITIS TYPE (H): ICD-10-CM

## 2022-09-13 RX ORDER — TIZANIDINE 2 MG/1
2 TABLET ORAL 3 TIMES DAILY PRN
Qty: 90 TABLET | Refills: 1 | Status: SHIPPED | OUTPATIENT
Start: 2022-09-13 | End: 2022-11-11

## 2022-09-22 ENCOUNTER — MYC MEDICAL ADVICE (OUTPATIENT)
Dept: ANESTHESIOLOGY | Facility: CLINIC | Age: 42
End: 2022-09-22

## 2022-09-22 DIAGNOSIS — K86.1 CHRONIC PANCREATITIS, UNSPECIFIED PANCREATITIS TYPE (H): ICD-10-CM

## 2022-09-22 NOTE — TELEPHONE ENCOUNTER
Amitriptyline refilled, No changes.   Pt was last seen on 6/9/22. [facility]     Angela Davila LPN

## 2022-10-16 ENCOUNTER — HEALTH MAINTENANCE LETTER (OUTPATIENT)
Age: 42
End: 2022-10-16

## 2022-10-26 ENCOUNTER — VIRTUAL VISIT (OUTPATIENT)
Dept: ANESTHESIOLOGY | Facility: CLINIC | Age: 42
End: 2022-10-26
Payer: COMMERCIAL

## 2022-10-26 DIAGNOSIS — K86.1 CHRONIC PANCREATITIS, UNSPECIFIED PANCREATITIS TYPE (H): ICD-10-CM

## 2022-10-26 DIAGNOSIS — G89.29 CHRONIC ABDOMINAL PAIN: Primary | ICD-10-CM

## 2022-10-26 DIAGNOSIS — M79.18 MYOFASCIAL PAIN: ICD-10-CM

## 2022-10-26 DIAGNOSIS — R10.9 CHRONIC ABDOMINAL PAIN: Primary | ICD-10-CM

## 2022-10-26 PROCEDURE — 99214 OFFICE O/P EST MOD 30 MIN: CPT | Mod: 95 | Performed by: ANESTHESIOLOGY

## 2022-10-26 ASSESSMENT — ANXIETY QUESTIONNAIRES
8. IF YOU CHECKED OFF ANY PROBLEMS, HOW DIFFICULT HAVE THESE MADE IT FOR YOU TO DO YOUR WORK, TAKE CARE OF THINGS AT HOME, OR GET ALONG WITH OTHER PEOPLE?: SOMEWHAT DIFFICULT
7. FEELING AFRAID AS IF SOMETHING AWFUL MIGHT HAPPEN: NOT AT ALL
GAD7 TOTAL SCORE: 5
4. TROUBLE RELAXING: SEVERAL DAYS
GAD7 TOTAL SCORE: 5
1. FEELING NERVOUS, ANXIOUS, OR ON EDGE: SEVERAL DAYS
6. BECOMING EASILY ANNOYED OR IRRITABLE: NOT AT ALL
2. NOT BEING ABLE TO STOP OR CONTROL WORRYING: SEVERAL DAYS
7. FEELING AFRAID AS IF SOMETHING AWFUL MIGHT HAPPEN: NOT AT ALL
IF YOU CHECKED OFF ANY PROBLEMS ON THIS QUESTIONNAIRE, HOW DIFFICULT HAVE THESE PROBLEMS MADE IT FOR YOU TO DO YOUR WORK, TAKE CARE OF THINGS AT HOME, OR GET ALONG WITH OTHER PEOPLE: SOMEWHAT DIFFICULT
3. WORRYING TOO MUCH ABOUT DIFFERENT THINGS: SEVERAL DAYS
5. BEING SO RESTLESS THAT IT IS HARD TO SIT STILL: SEVERAL DAYS

## 2022-10-26 ASSESSMENT — ENCOUNTER SYMPTOMS
VOMITING: 0
JAUNDICE: 0
FEVER: 0
DECREASED CONCENTRATION: 0
WEIGHT GAIN: 0
CHILLS: 0
NIGHT SWEATS: 0
DECREASED APPETITE: 1
HEARTBURN: 0
NAUSEA: 0
ALTERED TEMPERATURE REGULATION: 0
NERVOUS/ANXIOUS: 1
BLOOD IN STOOL: 0
POLYDIPSIA: 0
BOWEL INCONTINENCE: 0
BLOATING: 1
RECTAL PAIN: 0
POLYPHAGIA: 0
INCREASED ENERGY: 0
PANIC: 1
DIARRHEA: 0
INSOMNIA: 1
FATIGUE: 1
HALLUCINATIONS: 0
WEIGHT LOSS: 1
CONSTIPATION: 1
DEPRESSION: 1
ABDOMINAL PAIN: 1

## 2022-10-26 ASSESSMENT — PAIN SCALES - PAIN ENJOYMENT GENERAL ACTIVITY SCALE (PEG)
AVG_PAIN_PASTWEEK: 5
PEG_TOTALSCORE: 5.67
AVG_PAIN_PASTWEEK: 5
INTERFERED_ENJOYMENT_LIFE: 6
INTERFERED_ENJOYMENT_LIFE: 6
PEG_TOTALSCORE: 5.67
INTERFERED_GENERAL_ACTIVITY: 6
INTERFERED_GENERAL_ACTIVITY: 6

## 2022-10-26 ASSESSMENT — PAIN SCALES - GENERAL: PAINLEVEL: SEVERE PAIN (6)

## 2022-10-26 NOTE — PROGRESS NOTES
[facility]    Date of visit: 10/26/2022    Chief complaint:   Chief Complaint   Patient presents with     RECHECK     Follow-up       Interval history:  Alexandra Nunez was last seen by me on 6/9/2022.      Recommendations/plan at the last visit included:  Plan:  1. Physical Therapy:  Continue current daily HEPs  2. Clinical Health Psychologist to address issues of relaxation, behavioral change, coping style, and other factors important to improvement.  Referral placed  3. ref  4. Diagnostic Studies:  non  5. Medication Management:  Refills provided  6. Further procedures recommended: not at this time  7. Recommendations to PCP: none  8. Follow up: 4 months        Since her last visit, Alexandra Nunez reports:  - Pain hasn't changed much in character, frequency or duration. Her celiac plexus block worked for a week or two but not much beyond that.  - Looking for referral to acupuncture and for pain psychology  - Does not feel tizanidine is helping much, maybe dropping her pain score 1-2 points  - Amitriptyline is really helping at night  - Fatigue is still an issue and makes work days difficult      Pain scores:  Pain intensity on average is 6 on a scale of 0-10.     pain treatments:   Amitriptyline 25mg HS  Tizanidine 2mg TID pRN        Side Effects: no side effect    Medications:  Current Outpatient Medications   Medication Sig Dispense Refill     albuterol (PROAIR HFA/PROVENTIL HFA/VENTOLIN HFA) 108 (90 Base) MCG/ACT inhaler Inhale 1-2 puffs into the lungs every 4 hours as needed for shortness of breath / dyspnea or wheezing       albuterol (PROVENTIL) (2.5 MG/3ML) 0.083% neb solution Take 2.5 mg by nebulization every 4 hours as needed for shortness of breath / dyspnea or wheezing       ALPRAZolam (XANAX) 0.5 MG tablet Take 0.5 mg by mouth 2 times daily as needed for anxiety       amitriptyline (ELAVIL) 25 MG tablet Take 1 tablet (25 mg) by mouth At Bedtime 30 tablet 1     ibuprofen  (ADVIL/MOTRIN) 100 MG/5ML suspension Take 600 mg by mouth every 6 hours as needed for fever or moderate pain       levonorgestrel (MIRENA) 20 MCG/24HR IUD 1 each by Intrauterine route once       methocarbamol (ROBAXIN) 750 MG tablet Take 1 tablet (750 mg) by mouth 4 times daily as needed for muscle spasms 90 tablet 1     ondansetron (ZOFRAN) 4 MG tablet Take by mouth every 6 hours as needed for nausea       tiZANidine (ZANAFLEX) 2 MG tablet Take 1 tablet (2 mg) by mouth 3 times daily as needed for muscle spasms 90 tablet 1     Vitamin D3 (CHOLECALCIFEROL) 25 mcg (1000 units) tablet Take 1 tablet by mouth daily       fluticasone (FLOVENT HFA) 110 MCG/ACT inhaler Inhale 2 puffs into the lungs 2 times daily (Patient not taking: Reported on 10/26/2022)       sodium chloride, PF, (SALINE FLUSH) 0.9% PF flush 3 mLs by Intracatheter route every 8 hours (Patient not taking: Reported on 10/26/2022)       traMADol (ULTRAM) 50 MG tablet Take 1 tablet (50 mg) by mouth 2 times daily as needed for severe pain (Patient not taking: Reported on 10/26/2022) 20 tablet 0       Medical History: any changes in medical history since they were last seen? No    Review of Systems:  The 14 system ROS was reviewed from the intake questionnaire, and is positive for: abdominal pain  Any bowel or bladder problems: no  Mood: WNL    Physical Exam: (Virtual Visit)  There were no vitals taken for this visit.  General: NAD, virtual visit from car  Gait: Not assessed  MSK exam: Not performed d/t virtual  visit    Assessment:   Chronic pancreatitis  Abdominal pain    Alexandra Nunez is a 42 year old female who is seen at the pain clinic for chronic pancreatitis pain. She has not found much relief over the past few months from baseline. She did not get into physical therapy yet so she is rescheduling that. She has friends that have had great results with acupuncture and she would like to try.    Plan:  1. Physical Therapy: Will reschedule with her  PT  2. Clinical Health Psychologist to address issues of relaxation, behavioral change, coping style, and other factors important to improvement.  New Rx sent   3. Diagnostic Studies:  Not at this time  4. Medication Management:  Will increase amitriptyline as tolerated to 50mg HS  5. Further procedures recommended: Not at this time, would consider repeat celiac in the future  6. Recommendations to PCP: Increasing amitriptyline, starting PT & pain psychology, referring for acupuncture  7. Follow up: 4 months        Jc Robertson M.D.  [facility] Pain Fellow    I saw and examined the patient with the Pain Fellow/Resident. I have reviewed and agree with the resident's note and plan of care and made changes and corrections directly to the body of the note.    TIME SPENT:  BY FELLOW/RESIDENT ALONE 15 MIN  BY MYSELF AND FELLOW/RESIDENT TOGETHER 15 MIN      This time includes time for chart review, preparation, and documentation.     Carla Boyd MD  Pain Medicine, Department of Anesthesiology  , [facility]              Answers for HPI/ROS submitted by the patient on 10/26/2022  CROW 7 TOTAL SCORE: 5  General Symptoms: Yes  Skin Symptoms: No  HENT Symptoms: No  EYE SYMPTOMS: No  HEART SYMPTOMS: No  LUNG SYMPTOMS: No  INTESTINAL SYMPTOMS: Yes  URINARY SYMPTOMS: No  GYNECOLOGIC SYMPTOMS: No  BREAST SYMPTOMS: No  SKELETAL SYMPTOMS: No  BLOOD SYMPTOMS: No  NERVOUS SYSTEM SYMPTOMS: No  MENTAL HEALTH SYMPTOMS: Yes  Fever: No  Loss of appetite: Yes  Weight loss: Yes  Weight gain: No  Fatigue: Yes  Night sweats: No  Chills: No  Increased stress: No  Excessive hunger: No  Excessive thirst: No  Feeling hot or cold when others believe the temperature is normal: No  Loss of height: No  Post-operative complications: No  Surgical site pain: No  Hallucinations: No  Change in or Loss of Energy: No  Hyperactivity: No  Confusion: No  Heart burn or indigestion: No  Nausea: No  Vomiting: No  Abdominal pain:  Yes  Bloating: Yes  Constipation: Yes  Diarrhea: No  Blood in stool: No  Black stools: No  Rectal or Anal pain: No  Fecal incontinence: No  Yellowing of skin or eyes: No  Vomit with blood: No  Change in stools: No  Nervous or Anxious: Yes  Depression: Yes  Trouble sleeping: Yes  Trouble thinking or concentrating: No  Mood changes: No  Panic attacks: Yes

## 2022-10-26 NOTE — LETTER
10/26/2022       RE: Alexandra Nunez  [address] [place] [address]     Dear Colleague,    Thank you for referring your patient, Alexandra Nunez, to the [facility] [place] at [facility]. Please see a copy of my visit note below.    [facility]    Date of visit: 10/26/2022    Chief complaint:   Chief Complaint   Patient presents with     RECHECK     Follow-up       Interval history:  Alexandra Nunez was last seen by me on 6/9/2022.      Recommendations/plan at the last visit included:  Plan:  1. Physical Therapy:  Continue current daily HEPs  2. Clinical Health Psychologist to address issues of relaxation, behavioral change, coping style, and other factors important to improvement.  Referral placed  3. ref  4. Diagnostic Studies:  non  5. Medication Management:  Refills provided  6. Further procedures recommended: not at this time  7. Recommendations to PCP: none  8. Follow up: 4 months        Since her last visit, Alexandra Nunez reports:  - Pain hasn't changed much in character, frequency or duration. Her celiac plexus block worked for a week or two but not much beyond that.  - Looking for referral to acupuncture and for pain psychology  - Does not feel tizanidine is helping much, maybe dropping her pain score 1-2 points  - Amitriptyline is really helping at night  - Fatigue is still an issue and makes work days difficult      Pain scores:  Pain intensity on average is 6 on a scale of 0-10.     pain treatments:   Amitriptyline 25mg HS  Tizanidine 2mg TID pRN        Side Effects: no side effect    Medications:  Current Outpatient Medications   Medication Sig Dispense Refill     albuterol (PROAIR HFA/PROVENTIL HFA/VENTOLIN HFA) 108 (90 Base) MCG/ACT inhaler Inhale 1-2 puffs into the lungs every 4 hours as needed for shortness of breath / dyspnea or wheezing       albuterol (PROVENTIL) (2.5 MG/3ML)  0.083% neb solution Take 2.5 mg by nebulization every 4 hours as needed for shortness of breath / dyspnea or wheezing       ALPRAZolam (XANAX) 0.5 MG tablet Take 0.5 mg by mouth 2 times daily as needed for anxiety       amitriptyline (ELAVIL) 25 MG tablet Take 1 tablet (25 mg) by mouth At Bedtime 30 tablet 1     ibuprofen (ADVIL/MOTRIN) 100 MG/5ML suspension Take 600 mg by mouth every 6 hours as needed for fever or moderate pain       levonorgestrel (MIRENA) 20 MCG/24HR IUD 1 each by Intrauterine route once       methocarbamol (ROBAXIN) 750 MG tablet Take 1 tablet (750 mg) by mouth 4 times daily as needed for muscle spasms 90 tablet 1     ondansetron (ZOFRAN) 4 MG tablet Take by mouth every 6 hours as needed for nausea       tiZANidine (ZANAFLEX) 2 MG tablet Take 1 tablet (2 mg) by mouth 3 times daily as needed for muscle spasms 90 tablet 1     Vitamin D3 (CHOLECALCIFEROL) 25 mcg (1000 units) tablet Take 1 tablet by mouth daily       fluticasone (FLOVENT HFA) 110 MCG/ACT inhaler Inhale 2 puffs into the lungs 2 times daily (Patient not taking: Reported on 10/26/2022)       sodium chloride, PF, (SALINE FLUSH) 0.9% PF flush 3 mLs by Intracatheter route every 8 hours (Patient not taking: Reported on 10/26/2022)       traMADol (ULTRAM) 50 MG tablet Take 1 tablet (50 mg) by mouth 2 times daily as needed for severe pain (Patient not taking: Reported on 10/26/2022) 20 tablet 0       Medical History: any changes in medical history since they were last seen? No    Review of Systems:  The 14 system ROS was reviewed from the intake questionnaire, and is positive for: abdominal pain  Any bowel or bladder problems: no  Mood: WNL    Physical Exam: (Virtual Visit)  There were no vitals taken for this visit.  General: NAD, virtual visit from car  Gait: Not assessed  MSK exam: Not performed d/t virtual  visit    Assessment:   Chronic pancreatitis  Abdominal pain    Alexandra Nunez is a 42 year old female who is seen at the [facility]  clinic for chronic pancreatitis pain. She has not found much relief over the past few months from baseline. She did not get into physical therapy yet so she is rescheduling that. She has friends that have had great results with acupuncture and she would like to try.    Plan:  1. Physical Therapy: Will reschedule with her PT  2. Clinical Health Psychologist to address issues of relaxation, behavioral change, coping style, and other factors important to improvement.  New Rx sent   3. Diagnostic Studies:  Not at this time  4. Medication Management:  Will increase amitriptyline as tolerated to 50mg HS  5. Further procedures recommended: Not at this time, would consider repeat celiac in the future  6. Recommendations to PCP: Increasing amitriptyline, starting PT & pain psychology, referring for acupuncture  7. Follow up: 4 months        Jc Robertson M.D.  [facility] Pain Fellow    I saw and examined the patient with the Pain Fellow/Resident. I have reviewed and agree with the resident's note and plan of care and made changes and corrections directly to the body of the note.    TIME SPENT:  BY FELLOW/RESIDENT ALONE 15 MIN  BY MYSELF AND FELLOW/RESIDENT TOGETHER 15 MIN      This time includes time for chart review, preparation, and documentation.     Carla Boyd MD  Pain Medicine, Department of Anesthesiology  , [facility]        Answers for HPI/ROS submitted by the patient on 10/26/2022  CROW 7 TOTAL SCORE: 5  General Symptoms: Yes  Skin Symptoms: No  HENT Symptoms: No  EYE SYMPTOMS: No  HEART SYMPTOMS: No  LUNG SYMPTOMS: No  INTESTINAL SYMPTOMS: Yes  URINARY SYMPTOMS: No  GYNECOLOGIC SYMPTOMS: No  BREAST SYMPTOMS: No  SKELETAL SYMPTOMS: No  BLOOD SYMPTOMS: No  NERVOUS SYSTEM SYMPTOMS: No  MENTAL HEALTH SYMPTOMS: Yes  Fever: No  Loss of appetite: Yes  Weight loss: Yes  Weight gain: No  Fatigue: Yes  Night sweats: No  Chills: No  Increased stress: No  Excessive hunger: No  Excessive thirst:  No  Feeling hot or cold when others believe the temperature is normal: No  Loss of height: No  Post-operative complications: No  Surgical site pain: No  Hallucinations: No  Change in or Loss of Energy: No  Hyperactivity: No  Confusion: No  Heart burn or indigestion: No  Nausea: No  Vomiting: No  Abdominal pain: Yes  Bloating: Yes  Constipation: Yes  Diarrhea: No  Blood in stool: No  Black stools: No  Rectal or Anal pain: No  Fecal incontinence: No  Yellowing of skin or eyes: No  Vomit with blood: No  Change in stools: No  Nervous or Anxious: Yes  Depression: Yes  Trouble sleeping: Yes  Trouble thinking or concentrating: No  Mood changes: No  Panic attacks: Yes      Sincerely,    Carla Boyd MD

## 2022-10-26 NOTE — PATIENT INSTRUCTIONS
Referrals:     Acupuncture Referral.  -Please call your insurance provider to find out about acupuncture coverage, being that not all policies cover acupuncture services.       Pain Psychology Referral placed-  Please contact their scheduling office at [phone] to schedule, if you have not heard from them within 2 business days.        Treatment planning:    Recommendation to PCP- increasing Amitriptyline as tolerated to 50 mg HS.       Recommended Follow up:      Follow up in 4 months.          Please call [phone] to schedule your clinic appointment if you don't already have an appointment scheduled.        To speak with a nurse, schedule/reschedule/cancel a clinic appointment, or request a medication refill call: [phone]    You can also reach us by Kony: [email or web address]

## 2022-10-26 NOTE — NURSING NOTE
Patient presents with:  RECHECK: Follow-up      Data Unavailable     Pain Medications     Opioid Agonists Refills Start End     traMADol (ULTRAM) 50 MG tablet    0 9/17/2021     Sig - Route: Take 1 tablet (50 mg) by mouth 2 times daily as needed for severe pain - Oral    Patient not taking: Reported on 10/26/2022       Class: No Print Out    Notes to Pharmacy: Verbal order to [facility] on file          What medications are you using for pain? Tizanidine, amitryptiline    (Return Patients only) What refills are you needing today? Tramadol    Migue Joiner, EMT

## 2022-10-26 NOTE — PROGRESS NOTES
Alexandra is a 42 year old who is being evaluated via a billable video visit.      How would you like to obtain your AVS? MyChart  If the video visit is dropped, the invitation should be resent by: Text to cell phone: [phone]  Will anyone else be joining your video visit? Mayra Joiner, EMT

## 2022-11-11 DIAGNOSIS — G89.29 CHRONIC ABDOMINAL PAIN: ICD-10-CM

## 2022-11-11 DIAGNOSIS — R10.9 CHRONIC ABDOMINAL PAIN: ICD-10-CM

## 2022-11-11 DIAGNOSIS — K86.1 CHRONIC PANCREATITIS, UNSPECIFIED PANCREATITIS TYPE (H): ICD-10-CM

## 2022-11-11 RX ORDER — TIZANIDINE 2 MG/1
2 TABLET ORAL 3 TIMES DAILY PRN
Qty: 90 TABLET | Refills: 1 | Status: SHIPPED | OUTPATIENT
Start: 2022-11-11 | End: 2023-01-04

## 2022-11-11 NOTE — TELEPHONE ENCOUNTER
Medication refilled and sent to patient's pharmacy. No changes. Patient last seen 10/26/22 with Dr. Boyd.      Luna Martins RN

## 2022-11-18 DIAGNOSIS — K86.1 CHRONIC PANCREATITIS, UNSPECIFIED PANCREATITIS TYPE (H): ICD-10-CM

## 2022-11-18 RX ORDER — AMITRIPTYLINE HYDROCHLORIDE 50 MG/1
50 TABLET ORAL AT BEDTIME
Qty: 30 TABLET | Refills: 0 | Status: SHIPPED | OUTPATIENT
Start: 2022-11-18 | End: 2022-11-29

## 2022-11-27 ENCOUNTER — MYC MEDICAL ADVICE (OUTPATIENT)
Dept: ANESTHESIOLOGY | Facility: CLINIC | Age: 42
End: 2022-11-27

## 2022-11-27 DIAGNOSIS — K86.1 CHRONIC PANCREATITIS, UNSPECIFIED PANCREATITIS TYPE (H): ICD-10-CM

## 2022-12-03 NOTE — IR NOTE
Patient Name: Alexandra Nunez  Medical Record Number: [id number]  Today's Date: 6/15/2021    Procedure: CT abdomen peritoneum abscess drainage  Proceduralist: [email or web address]    Procedure Start: 13:16  Procedure end: 13:30  Sedation medications administered: 1 mg versed, 50 mcg fentanyl, 4mg zofran    Report given to:  RN   : none    Other Notes: Pt arrived to IR room 1 from . Consent reviewed. Pt declined HCG and denies any possibility of pregnancy.  Pt denies any questions or concerns regarding procedure. C/o nausea and zofran IV given prior to sedation. Pt positioned prone and monitored per protocol. Pt tolerated procedure without any noted complications. 30ml abscess drainage collected and sent for cultures. Pt transferred back to .   No

## 2023-01-04 ENCOUNTER — MYC MEDICAL ADVICE (OUTPATIENT)
Dept: ANESTHESIOLOGY | Facility: CLINIC | Age: 43
End: 2023-01-04

## 2023-01-04 DIAGNOSIS — R10.9 CHRONIC ABDOMINAL PAIN: ICD-10-CM

## 2023-01-04 DIAGNOSIS — K86.1 CHRONIC PANCREATITIS, UNSPECIFIED PANCREATITIS TYPE (H): ICD-10-CM

## 2023-01-04 DIAGNOSIS — G89.29 CHRONIC ABDOMINAL PAIN: ICD-10-CM

## 2023-01-04 RX ORDER — TIZANIDINE 2 MG/1
2 TABLET ORAL 3 TIMES DAILY PRN
Qty: 90 TABLET | Refills: 1 | Status: SHIPPED | OUTPATIENT
Start: 2023-01-04 | End: 2023-03-07

## 2023-01-25 NOTE — PROGRESS NOTES
Video-Visit Details    Type of service:  Video Visit    Video Duration 16 minutes    Originating Location (pt. Location): Home        Distant Location (provider location):  On-site    Mode of Communication:  Video Conference via Salvador Olivares MD    [facility]    Date of visit: 1/27/2023    Chief complaint: No chief complaint on file.      Interval history:  Alexandra Nunez was last seen by me on 10/26/2022.      Recommendations/plan at the last visit included:  1. Physical Therapy: Will reschedule with her PT  2. Clinical Health Psychologist to address issues of relaxation, behavioral change, coping style, and other factors important to improvement.  New Rx sent            3. Diagnostic Studies:  Not at this time  4. Medication Management:  Will increase amitriptyline as tolerated to 50mg HS  5. Further procedures recommended: Not at this time, would consider repeat celiac in the future  6. Recommendations to PCP: Increasing amitriptyline, starting PT & pain psychology, referring for acupuncture  7. Follow up: 4 months    Since her last visit, Alexandra Nunez reports:  Still noticing fatigue  First appointment with pain psychology is in February  Hesitant about working out because increased exhaustion is an issue with increased activity  Started acupuncture, but not consistently - not sure if it's helping    Most of the pain is on the right > left side of the abdomen        Pain scores:  Pain intensity on average is 6 on a scale of 0-10.     Current pain treatments:   Amitriptyline 25mg HS - has tried higher doses but did not tolerate that dose  Tizanidine 2mg TID PRN - sometimes takes 2 tabs at a time; tries to only take it at night time    Past pain treatments:  - celiac plexus block    Side Effects: denies any problems    Medications:  Current Outpatient Medications   Medication Sig Dispense Refill     albuterol (PROAIR HFA/PROVENTIL HFA/VENTOLIN HFA) 108 (90 Base)  MCG/ACT inhaler Inhale 1-2 puffs into the lungs every 4 hours as needed for shortness of breath / dyspnea or wheezing       albuterol (PROVENTIL) (2.5 MG/3ML) 0.083% neb solution Take 2.5 mg by nebulization every 4 hours as needed for shortness of breath / dyspnea or wheezing       ALPRAZolam (XANAX) 0.5 MG tablet Take 0.5 mg by mouth 2 times daily as needed for anxiety       amitriptyline (ELAVIL) 25 MG tablet Take 1 tablet (25 mg) by mouth At Bedtime 30 tablet 1     fluticasone (FLOVENT HFA) 110 MCG/ACT inhaler Inhale 2 puffs into the lungs 2 times daily (Patient not taking: Reported on 10/26/2022)       ibuprofen (ADVIL/MOTRIN) 100 MG/5ML suspension Take 600 mg by mouth every 6 hours as needed for fever or moderate pain       levonorgestrel (MIRENA) 20 MCG/24HR IUD 1 each by Intrauterine route once       methocarbamol (ROBAXIN) 750 MG tablet Take 1 tablet (750 mg) by mouth 4 times daily as needed for muscle spasms 90 tablet 1     ondansetron (ZOFRAN) 4 MG tablet Take by mouth every 6 hours as needed for nausea       sodium chloride, PF, (SALINE FLUSH) 0.9% PF flush 3 mLs by Intracatheter route every 8 hours (Patient not taking: Reported on 10/26/2022)       tiZANidine (ZANAFLEX) 2 MG tablet Take 1 tablet (2 mg) by mouth 3 times daily as needed for muscle spasms 90 tablet 1     traMADol (ULTRAM) 50 MG tablet Take 1 tablet (50 mg) by mouth 2 times daily as needed for severe pain (Patient not taking: Reported on 10/26/2022) 20 tablet 0     Vitamin D3 (CHOLECALCIFEROL) 25 mcg (1000 units) tablet Take 1 tablet by mouth daily         Medical History: any changes in medical history since they were last seen? No    Review of Systems:  The 14 system ROS was reviewed from the intake questionnaire, and is positive for: abdominal pain  Any bowel or bladder problems: denies  Mood: stable    Physical Exam: Virtual Visit  There were no vitals taken for this visit.  General: AAOx3, NAD      Assessment:   Chronic  pancreatitis  Abdominal pain    Alexandra Nunez is a 42 year old female who is seen at the pain clinic for chronic pancreatitis pain.  She is doing well overall and we will continue her current medication regimen. We discussed therapy options and will refer her to physical therapy.     Plan:  1. Physical Therapy:  Referral placed  2. Clinical Health Psychologist to address issues of relaxation, behavioral change, coping style, and other factors important to improvement.  Continue current theapy  3. Diagnostic Studies:  none  4. Medication Management:  Refill as needed  5. Further procedures recommended: none  6. Recommendations to PCP: none  7. Follow up: 3 to 6 months    Carla Boyd MD    Pain Medicine  Department of Anesthesiology  [facility]                Answers for HPI/ROS submitted by the patient on 1/27/2023  CROW 7 TOTAL SCORE: 12

## 2023-01-27 ENCOUNTER — VIRTUAL VISIT (OUTPATIENT)
Dept: ANESTHESIOLOGY | Facility: CLINIC | Age: 43
End: 2023-01-27
Payer: COMMERCIAL

## 2023-01-27 DIAGNOSIS — R10.9 CHRONIC ABDOMINAL PAIN: ICD-10-CM

## 2023-01-27 DIAGNOSIS — G89.4 CHRONIC PAIN SYNDROME: Primary | ICD-10-CM

## 2023-01-27 DIAGNOSIS — G89.29 CHRONIC ABDOMINAL PAIN: ICD-10-CM

## 2023-01-27 PROCEDURE — 99213 OFFICE O/P EST LOW 20 MIN: CPT | Mod: 95 | Performed by: ANESTHESIOLOGY

## 2023-01-27 ASSESSMENT — ANXIETY QUESTIONNAIRES
6. BECOMING EASILY ANNOYED OR IRRITABLE: NOT AT ALL
2. NOT BEING ABLE TO STOP OR CONTROL WORRYING: MORE THAN HALF THE DAYS
7. FEELING AFRAID AS IF SOMETHING AWFUL MIGHT HAPPEN: SEVERAL DAYS
3. WORRYING TOO MUCH ABOUT DIFFERENT THINGS: MORE THAN HALF THE DAYS
GAD7 TOTAL SCORE: 12
1. FEELING NERVOUS, ANXIOUS, OR ON EDGE: NEARLY EVERY DAY
7. FEELING AFRAID AS IF SOMETHING AWFUL MIGHT HAPPEN: SEVERAL DAYS
4. TROUBLE RELAXING: MORE THAN HALF THE DAYS
5. BEING SO RESTLESS THAT IT IS HARD TO SIT STILL: MORE THAN HALF THE DAYS
GAD7 TOTAL SCORE: 12
IF YOU CHECKED OFF ANY PROBLEMS ON THIS QUESTIONNAIRE, HOW DIFFICULT HAVE THESE PROBLEMS MADE IT FOR YOU TO DO YOUR WORK, TAKE CARE OF THINGS AT HOME, OR GET ALONG WITH OTHER PEOPLE: NOT DIFFICULT AT ALL
8. IF YOU CHECKED OFF ANY PROBLEMS, HOW DIFFICULT HAVE THESE MADE IT FOR YOU TO DO YOUR WORK, TAKE CARE OF THINGS AT HOME, OR GET ALONG WITH OTHER PEOPLE?: NOT DIFFICULT AT ALL

## 2023-01-27 ASSESSMENT — PAIN SCALES - PAIN ENJOYMENT GENERAL ACTIVITY SCALE (PEG)
AVG_PAIN_PASTWEEK: 6
PEG_TOTALSCORE: 8
AVG_PAIN_PASTWEEK: 6
INTERFERED_GENERAL_ACTIVITY: 10
INTERFERED_ENJOYMENT_LIFE: 8
PEG_TOTALSCORE: 8
INTERFERED_ENJOYMENT_LIFE: 8
INTERFERED_GENERAL_ACTIVITY: 10 - COMPLETELY INTERFERES

## 2023-01-27 ASSESSMENT — PAIN SCALES - GENERAL: PAINLEVEL: SEVERE PAIN (6)

## 2023-01-27 NOTE — PATIENT INSTRUCTIONS
Referrals:    Pool Therapy Referral placed. This is an external referral. Please call to schedule an appointment at a location you prefer. We will mail the order to you.        Recommended Follow up:      Follow up in 3 months.        Please call [phone] to schedule your clinic appointment if you don't already have an appointment scheduled.        To speak with a nurse, schedule/reschedule/cancel a clinic appointment, or request a medication refill call: [phone]    You can also reach us by PeerIndex: [email or web address]

## 2023-01-27 NOTE — NURSING NOTE
Patient presents with:  Follow Up: Follow-up Chronic Pancreatitis      Severe Pain (6)     Pain Medications     Opioid Agonists Refills Start End     traMADol (ULTRAM) 50 MG tablet    0 9/17/2021     Sig - Route: Take 1 tablet (50 mg) by mouth 2 times daily as needed for severe pain - Oral    Class: No Print Out    Notes to Pharmacy: Verbal order to [facility] on file          What medications are you using for pain? Tizandine    (New patients only) Have you been seen by another pain clinic/ provider? no    (Return Patients only) What refills are you needing today? Tizandine, Amitriptyline

## 2023-01-27 NOTE — PROGRESS NOTES
Alexandra is a 42 year old who is being evaluated via a billable video visit.      How would you like to obtain your AVS? Misocahart  If the video visit is dropped, the invitation should be resent by: Text to cell phone: [phone]  Will anyone else be joining your video visit? No

## 2023-01-27 NOTE — LETTER
1/27/2023       RE: Alexandra Nunez  [address] [place] [address]     Dear Colleague,    Thank you for referring your patient, Alexandra Nunez, to the [facility] FOR COMPREHENSIVE PAIN MANAGEMENT [place] at [facility]. Please see a copy of my visit note below.      [facility]      Date of visit: 1/27/2023    Chief complaint: No chief complaint on file.      Interval history:  Alexandra Nunez was last seen by me on 10/26/2022.      Recommendations/plan at the last visit included:  1. Physical Therapy: Will reschedule with her PT  2. Clinical Health Psychologist to address issues of relaxation, behavioral change, coping style, and other factors important to improvement.  New Rx sent            3. Diagnostic Studies:  Not at this time  4. Medication Management:  Will increase amitriptyline as tolerated to 50mg HS  5. Further procedures recommended: Not at this time, would consider repeat celiac in the future  6. Recommendations to PCP: Increasing amitriptyline, starting PT & pain psychology, referring for acupuncture  7. Follow up: 4 months    Since her last visit, Alexandra Nunez reports:  Still noticing fatigue  First appointment with pain psychology is in February  Hesitant about working out because increased exhaustion is an issue with increased activity  Started acupuncture, but not consistently - not sure if it's helping    Most of the pain is on the right > left side of the abdomen        Pain scores:  Pain intensity on average is 6 on a scale of 0-10.     Current pain treatments:   Amitriptyline 25mg HS - has tried higher doses but did not tolerate that dose  Tizanidine 2mg TID PRN - sometimes takes 2 tabs at a time; tries to only take it at night time    Past pain treatments:  - celiac plexus block    Side Effects: denies any problems    Medications:  Current Outpatient Medications   Medication Sig Dispense Refill      albuterol (PROAIR HFA/PROVENTIL HFA/VENTOLIN HFA) 108 (90 Base) MCG/ACT inhaler Inhale 1-2 puffs into the lungs every 4 hours as needed for shortness of breath / dyspnea or wheezing       albuterol (PROVENTIL) (2.5 MG/3ML) 0.083% neb solution Take 2.5 mg by nebulization every 4 hours as needed for shortness of breath / dyspnea or wheezing       ALPRAZolam (XANAX) 0.5 MG tablet Take 0.5 mg by mouth 2 times daily as needed for anxiety       amitriptyline (ELAVIL) 25 MG tablet Take 1 tablet (25 mg) by mouth At Bedtime 30 tablet 1     fluticasone (FLOVENT HFA) 110 MCG/ACT inhaler Inhale 2 puffs into the lungs 2 times daily (Patient not taking: Reported on 10/26/2022)       ibuprofen (ADVIL/MOTRIN) 100 MG/5ML suspension Take 600 mg by mouth every 6 hours as needed for fever or moderate pain       levonorgestrel (MIRENA) 20 MCG/24HR IUD 1 each by Intrauterine route once       methocarbamol (ROBAXIN) 750 MG tablet Take 1 tablet (750 mg) by mouth 4 times daily as needed for muscle spasms 90 tablet 1     ondansetron (ZOFRAN) 4 MG tablet Take by mouth every 6 hours as needed for nausea       sodium chloride, PF, (SALINE FLUSH) 0.9% PF flush 3 mLs by Intracatheter route every 8 hours (Patient not taking: Reported on 10/26/2022)       tiZANidine (ZANAFLEX) 2 MG tablet Take 1 tablet (2 mg) by mouth 3 times daily as needed for muscle spasms 90 tablet 1     traMADol (ULTRAM) 50 MG tablet Take 1 tablet (50 mg) by mouth 2 times daily as needed for severe pain (Patient not taking: Reported on 10/26/2022) 20 tablet 0     Vitamin D3 (CHOLECALCIFEROL) 25 mcg (1000 units) tablet Take 1 tablet by mouth daily         Medical History: any changes in medical history since they were last seen? No    Review of Systems:  The 14 system ROS was reviewed from the intake questionnaire, and is positive for: abdominal pain  Any bowel or bladder problems: denies  Mood: stable    Physical Exam: Virtual Visit  There were no vitals taken for this  visit.  General: AAOx3, NAD      Assessment:   Chronic pancreatitis  Abdominal pain    Alexandra Nunez is a 42 year old female who is seen at the pain clinic for chronic pancreatitis pain.  She is doing well overall and we will continue her current medication regimen. We discussed therapy options and will refer her to physical therapy.     Plan:  1. Physical Therapy:  Referral placed  2. Clinical Health Psychologist to address issues of relaxation, behavioral change, coping style, and other factors important to improvement.  Continue current theapy  3. Diagnostic Studies:  none  4. Medication Management:  Refill as needed  5. Further procedures recommended: none  6. Recommendations to PCP: none  7. Follow up: 3 to 6 months        Carla Boyd MD    Pain Medicine  Department of Anesthesiology  [facility]          Answers for HPI/ROS submitted by the patient on 1/27/2023  CROW 7 TOTAL SCORE: 12

## 2023-02-01 NOTE — PROGRESS NOTES
"Alexandra Nunez is a 42 year old female who is being evaluated via a billable video visit.      Patient is currently in the [facility]? yes    Video Start Time: 8:59 AM  Video Stop Time: 9:47 AM    PATIENT'S TREATMENT GOALS: \"I'm open to this.\"    Treatment goals: Pain psychology can help reduce physical and psychosocial triggers or reinforcers of pain by adapting thoughts, feelings and behaviors to reduce symptoms and increase quality of life. Evidence indicates that the practice of relaxation, meditation, and mindfulness techniques can significantly affect pain levels and overall well being. We discussed today that based upon your preferences and current pain treatment plan, you would likely benefit from adding the following treatment goals and strategies to your visits with pain psychology:      - sleep hygiene  - review of or development of self-soothing and self-comfort strategies  - development of a regular pain management regimen to include pain flare plan  - grief and loss as it relates to pain's interference in life  - psychoeducation on sympathetic nervous system response to pain  - exploration of the concepts of radical acceptance and window of tolerance   - basic relaxation strategies to include mindfulness     IDENTIFYING INFORMATION: The patient is a 42 year old, single individual who was seen today for a behavioral assessment as part of the evaluation process at the [facility].        PAIN DIAGNOSES per pain provider:       Myofascial pain    Chronic abdominal pain     Chronic pancreatitis, unspecified pancreatitis type (H)     Patient's primary complaint today is chronic pain, and they report difficulty with function in relationship to their pain. This patient is referred for consultation by Carla Boyd MD; please see their notes for more details of their pain symptoms. Per chart review of initial visit on 2/16/2022:     'Pain history:  Alexandra Nunez is a 41 year " "old female who first started having problems with pain in her abdomen. She was diagnosed with necrotizing pancreatitis and underwent feeding tube placement. The feeding tube has since been removed. She now continues to have pain in the front and back of her stomach as well as new onset pain under the left ribcage. She has been taking methocarbamol (4 tabs daily) but notes this is not that helpful and is just a \"bandage\" for the pain.  She has also been taking amitriptyline 25 mg and thinks this is helping a little bit. She has not had any other flares since the initial necrotizing pancreatitis. Her drain was removed in September, but is still recovering from the pain due to the prior feeding tube.      She notes that the pain essentially the same as two months ago.  She has not had any injections at this time.       Pain rating: Averages 4/10 on a 0-10 scale.  Aggravating factors include: movement, standing on feet  Relieving factors include: sitting and relaxing  Any bowel or bladder incontinence: constipation'    Patient has worked with a pain clinic in the past: Was previously seen by Dr Hilton.    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      Frequency of discussing their pain with others: not much anymore, felt she was burdening others especially her children  Frequency of thinking about their pain: daily  Ability to pace activity or obey limitations: typically able to pace - " occasionally feels she has to push through   Ability to relax: struggles due to pain  Current stress level: medium to high  Current stressors include: pain    Patient reports the following as it relates to how their pain impacts their sleep hygiene (endorsed in BOLD):  Difficulty falling asleep   Problems mid-awakenings   Poor quality of sleep  Daytime sleepiness or fatigue  Napping - on the weekend for 1-1.5 hours    Patient reports obtaining approximately 1-3 hours of sleep per night. This sleep is greatly disrupted by both general pain and position. Reports onset with drain, cannot sleep in other positions and believes possible scar tissue causes a painful pulling sensation where the drain was. No other sleep issues.   Current exercise regimen/impact of pain on ability to exercise: none - may try pool therapy    SOCIAL HISTORY:  Patient currently resides: in [place] with her children    Patient child/soraya: twins (17 years old)  Patient's social support network includes: a couple friends   Patient was raised in [place] and has one older brother.   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  Highest level of education completed: BA in [employer],  degree  History of  service: none  Disability benefits: none      MENTAL HEALTH HISTORY:        Mental health diagnosis/es current/past: anxiety with panic attacks,  depression  Current symptoms include: frequent panic attacks 2-3 times per week, frequent worry thoughts, tends to close out the world or detach, mood is ok but when very anxious prefers to be left alone  History of hospitalization for mental health reasons: none    Current psychotropic medications prescribed: Xanax - takes a couple times per week and may not take it when she needs it, Amitriptyline - had SE at increased dose (dizzy to the point she couldn't stand up and triggered vasovagal response)  Side effects from current psychotropic medications: none since reducing dose of Amitriptyline   Previous psychotropic medications: many anxiolytics, antidepressants  Patient s mental health history and support includes stating individual end of March.   Pain's impact on mood or other symptoms: feels very anxious due to pain and has exacerbated underlying anxiety    Safety Concerns:   Suicidal ideation: none  Homicidal ideation: none  History of childhood abuse/trauma: none   History of adult abuse/trauma: none     History of Head Trauma or evidence of cognitive impairment: No concussion history. Concentration issues.     STRENGTHS/LIMITATIONS INCLUDE:   Patient identified the following strengths or resources that will help them succeed in treatment: having children around - nervous about them leaving for college next year, good connection with children.    Patient identified potential barriers to wellness and self-care: pain, anxiety      CHEMICAL HEALTH BEHAVIORS:    Any illicit drug use: denies   Alcohol use: denies   Caffeine use: denies   Nicotine use: denies   Any use of prescriptions other than how they were prescribed: denies     Previous chemical dependency or other addictive behavior treatment: denies           CAGE/ AID QUESTIONNAIRE:   The CAGE screening questions (asking whether patients felt they should cut down on drinking, were annoyed by others criticizing her drinking, felt guilty about use, or ever  had an eye opener) were asked of the patient to determine possible ETOH or chemical abuse issues.   Her positive answers are as follows.    Have you ever:  None of the patient's responses to the CAGE screening were positive / Negative CAGE score Not assessed - patient denies current or historic issues related to substance abuse.      CURRENT MEDICAL CONCERNS:     Past Medical History:   Diagnosis Date     Pacemaker                 ASSESSMENT:   Patient is here today to determine whether pain psychology could be of benefit to their pain management services. Patient reports chronic pain related to pancreatitis for about two years. Pain has been fairly disruptive in most areas of her life since onset of pain; it tends to make socializing more difficult, negatively impacts work, makes completion of ADLS more difficult and has exacerbated underlying anxiety and depression. She reports she rarely receives more than 1-3 disrupted hours of sleep, which is likely largely contributing to her overall pain experience. She is awaiting individual therapy, and in general seems open to exploring new strategies to manage her chronic pain and increase her overall functioning as it relates to pain's interference in her life.      The patient participated in a virtual health and behavioral evaluation (billed 30506).  The limits of confidentiality and mandated reporting requirements were discussed. Time spent with patient: 48 minutes in virtual patient contact for a psychological diagnostic assessment and pain evaluation.     Telemedicine Visit: The patient's condition can be safely assessed and treated via synchronous audio and visual telemedicine encounter.      Reason for Telemedicine Visit: Services only offered telehealth    Originating Site (Patient Location): Patient's home    Distant Site (Provider Location): Provider Remote Setting- Home Office    Consent:  The patient/guardian has verbally consented to: the potential risks and  benefits of telemedicine (video visit) versus in person care; bill my insurance or make self-payment for services provided; and responsibility for payment of non-covered services.     Mode of Communication:  Video Conference via [employer]    As the provider I attest to compliance with applicable laws and regulations related to telemedicine.      Chelsey Horn PsyD LP  Licensed Psychologist  Outpatient Clinic Therapist  [facility]

## 2023-02-02 ENCOUNTER — MYC MEDICAL ADVICE (OUTPATIENT)
Dept: ANESTHESIOLOGY | Facility: CLINIC | Age: 43
End: 2023-02-02
Payer: COMMERCIAL

## 2023-02-02 DIAGNOSIS — K86.1 CHRONIC PANCREATITIS, UNSPECIFIED PANCREATITIS TYPE (H): ICD-10-CM

## 2023-02-13 ENCOUNTER — VIRTUAL VISIT (OUTPATIENT)
Dept: PALLIATIVE MEDICINE | Facility: CLINIC | Age: 43
End: 2023-02-13
Attending: ANESTHESIOLOGY

## 2023-02-13 DIAGNOSIS — K86.1 CHRONIC PANCREATITIS, UNSPECIFIED PANCREATITIS TYPE (H): ICD-10-CM

## 2023-02-13 DIAGNOSIS — R10.9 CHRONIC ABDOMINAL PAIN: ICD-10-CM

## 2023-02-13 DIAGNOSIS — G89.29 CHRONIC ABDOMINAL PAIN: ICD-10-CM

## 2023-02-13 DIAGNOSIS — M79.18 MYOFASCIAL PAIN: ICD-10-CM

## 2023-02-13 PROCEDURE — 96156 HLTH BHV ASSMT/REASSESSMENT: CPT | Mod: VID | Performed by: PSYCHOLOGIST

## 2023-03-03 ENCOUNTER — VIRTUAL VISIT (OUTPATIENT)
Dept: PALLIATIVE MEDICINE | Facility: CLINIC | Age: 43
End: 2023-03-03

## 2023-03-03 DIAGNOSIS — K86.1 CHRONIC PANCREATITIS, UNSPECIFIED PANCREATITIS TYPE (H): ICD-10-CM

## 2023-03-03 DIAGNOSIS — G89.29 CHRONIC ABDOMINAL PAIN: ICD-10-CM

## 2023-03-03 DIAGNOSIS — M79.18 MYOFASCIAL PAIN: Primary | ICD-10-CM

## 2023-03-03 DIAGNOSIS — R10.9 CHRONIC ABDOMINAL PAIN: ICD-10-CM

## 2023-03-03 PROCEDURE — 96159 HLTH BHV IVNTJ INDIV EA ADDL: CPT | Mod: VID | Performed by: PSYCHOLOGIST

## 2023-03-03 PROCEDURE — 96158 HLTH BHV IVNTJ INDIV 1ST 30: CPT | Mod: VID | Performed by: PSYCHOLOGIST

## 2023-03-03 NOTE — PROGRESS NOTES
Alexandra Nunez is a 42 year old female who is being evaluated via a billable video visit.      Patient is currently in the [facility]? yes    Patient would like the video invitation sent by: Text to cell phone: [phone]}    Video Start Time: 9:00 AM  Video Stop Time: 9:46 AM    Additional provider notes:     Pain Diagnoses per pain provider:   Myofascial pain     Chronic abdominal pain     Chronic pancreatitis, unspecified pancreatitis type (H)        DATA: During today's visit you reported the following: Your pancreatic and abdominal pain is about the same, 6-7/10. Your mood is worse - a few panic attacks. Your activity level is unchanged. Your stress level is the same. Your sleep is unchanged. You reported engaging in self-care for your pain 2-3 times daily.    You identified that you would like to focus on the following or had questions regarding the following issues or concerns, and we discussed the following:   - oriented to follow-up  - increased panic attacks - using deep breathing, meditation, leaving space you're in, snuggling dogs - last for a good 1-2 hours  - window of tolerance  - psychoeducation on sympathetic nervous system response to pain  - TIP skill - temperature (ice or cold water), (intensive) physical activity PACING, progressive muscle relaxation  - sleep hygiene skills - no clock watching, get up after 5-10 min and go someplace quiet (no phone, lights, tablets, tv, etc.), deep breathing, meditation    ASSESSMENT: Easily engaged, open to exploring new strategies to manage her pain and mood. Pain is essentially unchanged, does endorse increased mood symptoms in form of more frequent panic attacks. She identifies several positive coping skills she is already using, and presents as open to increased use and trying new strategies.     PLAN:   Your next appointment is scheduled for 3/17 at 9:00 AM.  Assignment/Objectives /interventions for next session:   - schedule self-care for  anxiety 1-3 times per week to start; at end of week explore factors that facilitated or prevented engagement and problem solve as needed  - try sleep hygiene tips as discussed    We believe regular attendance is key to your success in our program!      Any time you are unable to keep your appointment we ask that you call us at [phone] at least 24 hours in advance to cancel.This will allow us to offer the appointment time to another patient.     Multiple missed appointments may lead to dismissal from the clinic.    Telemedicine Visit: The patient's condition can be safely assessed and treated via synchronous audio and visual telemedicine encounter.      Reason for Telemedicine Visit: Services only offered telehealth    Originating Site (Patient Location): Patient's other parked car    Distant Site (Provider Location): Provider Remote Setting- Home Office    Consent:  The patient/guardian has verbally consented to: the potential risks and benefits of telemedicine (video visit) versus in person care; bill my insurance or make self-payment for services provided; and responsibility for payment of non-covered services.     Mode of Communication:  Video Conference via [employer]    As the provider I attest to compliance with applicable laws and regulations related to telemedicine.      Chelsey Horn PsyD LP  Licensed Psychologist  Outpatient Clinic Therapist  [facility]

## 2023-03-06 ENCOUNTER — MYC MEDICAL ADVICE (OUTPATIENT)
Dept: ANESTHESIOLOGY | Facility: CLINIC | Age: 43
End: 2023-03-06
Payer: COMMERCIAL

## 2023-03-06 DIAGNOSIS — K86.1 CHRONIC PANCREATITIS, UNSPECIFIED PANCREATITIS TYPE (H): ICD-10-CM

## 2023-03-06 DIAGNOSIS — G89.29 CHRONIC ABDOMINAL PAIN: ICD-10-CM

## 2023-03-06 DIAGNOSIS — R10.9 CHRONIC ABDOMINAL PAIN: ICD-10-CM

## 2023-03-07 RX ORDER — TIZANIDINE 2 MG/1
2 TABLET ORAL 3 TIMES DAILY PRN
Qty: 90 TABLET | Refills: 2 | Status: SHIPPED | OUTPATIENT
Start: 2023-03-07 | End: 2023-05-10

## 2023-03-07 NOTE — TELEPHONE ENCOUNTER
Refill request via [employer] for Tizanidine and Amitriptyline.   Last appointment 1/27/23.     Follow up scheduled for 5/3/23.    Mediation refilled, No Changes.

## 2023-03-17 ENCOUNTER — VIRTUAL VISIT (OUTPATIENT)
Dept: PALLIATIVE MEDICINE | Facility: CLINIC | Age: 43
End: 2023-03-17
Payer: COMMERCIAL

## 2023-03-17 DIAGNOSIS — M79.18 MYOFASCIAL PAIN: Primary | ICD-10-CM

## 2023-03-17 DIAGNOSIS — K86.1 CHRONIC PANCREATITIS, UNSPECIFIED PANCREATITIS TYPE (H): ICD-10-CM

## 2023-03-17 DIAGNOSIS — R10.9 CHRONIC ABDOMINAL PAIN: ICD-10-CM

## 2023-03-17 DIAGNOSIS — G89.29 CHRONIC ABDOMINAL PAIN: ICD-10-CM

## 2023-03-17 PROCEDURE — 96158 HLTH BHV IVNTJ INDIV 1ST 30: CPT | Mod: VID | Performed by: PSYCHOLOGIST

## 2023-03-17 NOTE — PROGRESS NOTES
Alexandra Nunez is a 42 year old female who is being evaluated via a billable video visit.      Patient is currently in the [facility]? yes    Patient would like the video invitation sent by: Text to cell phone: [phone]}    Video Start Time: 8:59 AM  Video Stop Time: 9:28 AM     Additional provider notes:     Pain Diagnoses per pain provider:   Myofascial pain     Chronic abdominal pain     Chronic pancreatitis, unspecified pancreatitis type (H)         DATA: During today's visit you reported the following: Your pain is worse - you attribute this to moving around more. Report pain is currently at 8/10 on average. Your mood is about the same. Your activity level is mildly increased. Your stress level is manageable - this is primarily related to health. Your sleep is not great - trying to get up when not falling asleep, still feeling tired. You reported engaging in self-care for your pain 2-3 times daily.    You identified that you would like to focus on the following or had questions regarding the following issues or concerns, and we discussed the following:   - trying to use more skills, feel this has increased pain  - taste buds still off since covid  - discussed scheduling follow-up with Dr. Aguiar due to steadily increased pain over the past few months despite use of skills    ASSESSMENT: Pain has been steadily increasing with no known trigger other than increased use of skills. We discussed it was not likely that using self-soothing skills could account for increased pain, however possibly increased physical activity could. We discussed pacing activity to see if this has any impact, to continue to engage in self-care as she has been and to reach out to gastro MD about overall increased pain experience.    PLAN:   Your next appointment is scheduled for 3/31 at 10:00 AM.  Assignment/Objectives /interventions for next session:   - continue to focus on self-care, pace activity if this increases  pain  - consider scheduling an appointment with Dr. Aguiar, Carla Boyd MD sooner if needed    We believe regular attendance is key to your success in our program!      Any time you are unable to keep your appointment we ask that you call us at [phone] at least 24 hours in advance to cancel.This will allow us to offer the appointment time to another patient.     Multiple missed appointments may lead to dismissal from the clinic.    Telemedicine Visit: The patient's condition can be safely assessed and treated via synchronous audio and visual telemedicine encounter.      Reason for Telemedicine Visit: Services only offered telehealth    Originating Site (Patient Location): Patient's home    Distant Site (Provider Location): Provider Remote Setting- Home Office    Consent:  The patient/guardian has verbally consented to: the potential risks and benefits of telemedicine (video visit) versus in person care; bill my insurance or make self-payment for services provided; and responsibility for payment of non-covered services.     Mode of Communication:  Video Conference via [employer]    As the provider I attest to compliance with applicable laws and regulations related to telemedicine.      Chelsey Horn PsyD LP  Licensed Psychologist  Outpatient Clinic Therapist  [facility]

## 2023-03-31 ENCOUNTER — VIRTUAL VISIT (OUTPATIENT)
Dept: PALLIATIVE MEDICINE | Facility: CLINIC | Age: 43
End: 2023-03-31
Payer: COMMERCIAL

## 2023-03-31 DIAGNOSIS — G89.29 CHRONIC ABDOMINAL PAIN: ICD-10-CM

## 2023-03-31 DIAGNOSIS — M79.18 MYOFASCIAL PAIN: Primary | ICD-10-CM

## 2023-03-31 DIAGNOSIS — R10.9 CHRONIC ABDOMINAL PAIN: ICD-10-CM

## 2023-03-31 DIAGNOSIS — K86.1 CHRONIC PANCREATITIS, UNSPECIFIED PANCREATITIS TYPE (H): ICD-10-CM

## 2023-03-31 PROCEDURE — 96159 HLTH BHV IVNTJ INDIV EA ADDL: CPT | Mod: VID | Performed by: PSYCHOLOGIST

## 2023-03-31 PROCEDURE — 96158 HLTH BHV IVNTJ INDIV 1ST 30: CPT | Mod: VID | Performed by: PSYCHOLOGIST

## 2023-03-31 NOTE — PROGRESS NOTES
Alexandra Nunez is a 42 year old female who is being evaluated via a billable video visit.      Patient is currently in the [facility]? yes    Patient would like the video invitation sent by: Text to cell phone: [phone]}    Video Start Time: 10:00 AM  Video Stop Time: 10:40 AM     Additional provider notes:     Pain Diagnoses per pain provider:   Myofascial pain     Chronic abdominal pain     Chronic pancreatitis, unspecified pancreatitis type (H)         DATA: During today's visit you reported the following: Your abdominal and pancreatic pain is worse - wonder if this is related to stress and 'funk' since intake for behavioral health. Your mood is down, 'in a funk'. Your activity level is mildly increased - trying to move around a bit more. Your stress level is increased - this is related to feeling overwhelmed with potential appointments, pain. Your sleep is unchanged. You reported engaging in self-care for your pain 2-3 times daily.    You identified that you would like to focus on the following or had questions regarding the following issues or concerns, and we discussed the following:   - intake for individual therapy - different than expected, report therapist believes you meet criteria for a medically induced eating disorder  - history of anorexia  - deep breathing, pacing activity, meditation  - discussed focusing on comfort level vs pain level    ASSESSMENT: Alexandra reports increased pain, likely related as she suspects to increased stress. She seems to be actively using skills to manage her pain and stress, however feels overwhelmed with potential appointments following intake for mental health where it was suggested she meet weekly with provider to start as well as resume nutritionist appointments. She seems open to exploring these supports, and can also acknowledge feeling overwhelmed by the frequency. She seems to have some anticipatory anxiety that builds throughout the day regarding her  pain, and would likely benefit from switching to a focus on her comfort level instead.    PLAN:   Your next appointment is scheduled for 4/25 at 4:00 PM.  Assignment/Objectives /interventions for next session:   - focus on comfort level and utilize skills when you note decrease in comfort  - continue to use self-care as often as needed    We believe regular attendance is key to your success in our program!      Any time you are unable to keep your appointment we ask that you call us at [phone] at least 24 hours in advance to cancel.This will allow us to offer the appointment time to another patient.     Multiple missed appointments may lead to dismissal from the clinic.    Telemedicine Visit: The patient's condition can be safely assessed and treated via synchronous audio and visual telemedicine encounter.      Reason for Telemedicine Visit: Services only offered telehealth    Originating Site (Patient Location): Patient's home    Distant Site (Provider Location): Provider Remote Setting- Home Office    Consent:  The patient/guardian has verbally consented to: the potential risks and benefits of telemedicine (video visit) versus in person care; bill my insurance or make self-payment for services provided; and responsibility for payment of non-covered services.     Mode of Communication:  Video Conference via [employer]    As the provider I attest to compliance with applicable laws and regulations related to telemedicine.      Chelsey Horn PsyD LP  Licensed Psychologist  Outpatient Clinic Therapist  [facility]

## 2023-04-11 ENCOUNTER — DOCUMENTATION ONLY (OUTPATIENT)
Dept: GASTROENTEROLOGY | Facility: CLINIC | Age: 43
End: 2023-04-11
Payer: COMMERCIAL

## 2023-04-11 NOTE — PROGRESS NOTES
Called PT and left VM.    Called to remind patient of their upcoming appointment with our GI clinic, on 04/17/23 at 3:45 PM with Dr. Johny Aguiar. This appointment is scheduled as a video visit. You will receive a call approximately 30 minutes prior to check you in, you must be in [place] for this visit., if your appointment is virtual (video or telephone) you need to be in [place] for the visit. To reschedule or cancel patient to call [phone].      SK

## 2023-04-12 ENCOUNTER — VIRTUAL VISIT (OUTPATIENT)
Dept: NUTRITION | Facility: CLINIC | Age: 43
End: 2023-04-12
Payer: COMMERCIAL

## 2023-04-12 DIAGNOSIS — G89.29 CHRONIC ABDOMINAL PAIN: ICD-10-CM

## 2023-04-12 DIAGNOSIS — K86.1 CHRONIC PANCREATITIS, UNSPECIFIED PANCREATITIS TYPE (H): Primary | ICD-10-CM

## 2023-04-12 DIAGNOSIS — R10.9 CHRONIC ABDOMINAL PAIN: ICD-10-CM

## 2023-04-12 PROCEDURE — 97803 MED NUTRITION INDIV SUBSEQ: CPT | Mod: VID | Performed by: DIETITIAN, REGISTERED

## 2023-04-12 NOTE — PROGRESS NOTES
Medical Nutrition Therapy  Visit Type: Reassessment and intervention    Visit Details    How would you like to obtain your AVS? MyChart  If the correspondence for visit is dropped, how would you like your dietitian to reconnect with you:   call back by phone? Yes    Text to cell phone: [phone]   Will anyone else be joining your video visit or telephone call? No  {If patient encounters technical issues they should call [phone] :55    Type of service:  Video Visit   Start Time: 10:03AM    End Time: 11:01 AM    Originating Location (pt. Location): Home    Distant Location (provider location):  off-site     Platform used for Video Visit: [facility]      Referring Provider: Carla Boyd  [facility]     REASON FOR REFERRAL:   Alexandra Nunez is a 41 year old female who is interested in Medical Nutrition Therapy (MNT) and education related to GI issues - constipation, gas, bloating, Abdominal Pain and pancreatitis.   She is accompanied by self.     She did look into the resources but didn't start the any cooking from them. She has gotten stuck with just the same foods with the smoothie and oatmeal. She wants to get back on the right track again. She still has the pain under her rib. It seems like every time she eats something that is solid she has abdominal pain. She did a cheese on tortilla with chicken and had symptoms. She has tried cutting back on dairy but not 100% out and thinks that maybe it is a trigger. She was tracking what she eats but was eating the same thing so stopped. She didn't pay attention to her BMs but was having the same pain intensity. When she switched from fare life to the plant based protein drink symptoms severity decreased but didn't go away. She was also doing eggs as she wasn't feeling good after eating them. She find that the gas subsided after taking eggs out. She felt that eggs were always a safe go to meal but than realized she was reactive.     She did switch from fare  life protein drink milk to protein drink protein drink. Own She has     Still does protein drink in the am     Sometimes for lunch does another protein drink or oatmeal     Dinner has been a lot of tuna sandwich she has tried gluten free and regular bread. She notices that the regular bread bothers her. She just has to keep getting back on track with staying gluten/dairy free.    She still has constipation, gas and bloating. It seems that dinner could be a better place to work on. She hasn't done snacks yet as not sure what to try.        Changes since previous consult Yes        Behavior Status:Improvement shown  Barriers Include:Lack of nutrition knowlege and digestion/abdominal pain     She has been doing a smoothie adding in pineapple, almond milk and couple scoops of collagen protein powder. She started with just 4oz. She seems to be tolerating them ok. She has a slight amount of pain on the right side under rib. Which is where she usually has pain. She has is open to trying to do for lunch because if she doesn't get hungry she tries protein drinks pre made. I provided few suggestions with lower allergen brands to try both dairy/gluten/soy free. She feels she is sensitive to the dairy so we meal planned a few ideas that had base taht is more dairy free. Recommend to follow more low fat healthy options and slowly increase.     Discussed putting in more purred foods that are easier to digest. Suggested to try some soups with softer foods such as sweet potato. She wants to work on slowly increasing foods. She is a little nervious about adding in foods. She went on a enzyme in the past and was suppose to get more education on it. Suggested that we try a digestive enzyme. Only have a BM maybe once per week. Didn't see much of a change since starting the smoothies about 6 days. We worked on trying some pineapple with natural digestive enzymes to see if that helps with the pain. Also suggested papaya for smoothie.        NUTRITION ASSESSMENT:        View : No data to display.                     6/14/2022    10:12 AM   Neurological   Migraine Headaches Past;Current   Tension Headache Past;Current       No flowsheet data found.        View : No data to display.                   6/14/2022    10:12 AM   Gastrointestinal   Constipation Past;Current   Nausea or Vomiting Past   Gas/bloating Past;Current   Gallstones Past   Blood in stool Past;Current   Pancreatitis Past;Current   Abdominal Pain Past;Current            View : No data to display.                   6/14/2022    10:12 AM   Endocrine   Hair thinning/loss Past          6/14/2022    10:12 AM   Skin   Eczema Past;Current           View : No data to display.                    View : No data to display.                   6/14/2022    10:12 AM   Psychological   Depression/Anxiety Past;Current   Anorexia Past           View : No data to display.                   6/14/2022    10:12 AM   Womens Health Assessment   Hysterectomy No   I am pregnant.  No   I am breastfeeding. No   I want to become pregnant within the next year . No   What do you use for contraception?  not needed/not sexually active   Any problems with current birth control method?   No   Are your periods irregular? Yes   Irregular periods Missed months   Are you officially in menopause? (no period for one year or longer)  No        Past Medical History:  Past Medical History:   Diagnosis Date     Pacemaker        Previous Surgeries:   Past Surgical History:   Procedure Laterality Date     INJECT NERVE BLOCK CELIAC PLEXUS Bilateral 3/18/2022    Procedure: Celiac Plexus Block;  Surgeon: Carla Boyd MD;  Location: [facility] OR     IR SINOGRAM INJECTION DIAGNOSTIC  7/12/2021     IR SINOGRAM INJECTION DIAGNOSTIC  7/22/2021     IR SINOGRAM INJECTION DIAGNOSTIC  8/12/2021     IR SINOGRAM INJECTION DIAGNOSTIC  8/25/2021     IR SINOGRAM INJECTION DIAGNOSTIC  9/22/2021     IR SINOGRAM INJECTION DIAGNOSTIC  8/19/2021     IR  SINOGRAM INJECTION THERAPEUTIC  8/5/2021     IR SINOGRAM INJECTION THERAPEUTIC  9/8/2021        Family History:  No family history on file.     Lifestyle History:      6/14/2022    10:12 AM   Lifestyle   Do you feel your life is stressful right now?  Yes   What is the cause(s) of stress in your life?  Over thinking and too much analysis;Taking care of parents, children or other family member   Are you currently implementing any strategies to help manage stress? Yes   What are you doing to manage stress?  Meditation;Breathing exercises;Affirmations;Acupuncture        Exercise History:      6/14/2022    10:12 AM   Exercise   Does your occupation require extended periods of sitting?  Yes   Does your occupation require extended periods of repetitive movements (ex: walking or lifting)?  No   Do you currently participate in any forms of exercise? No   Rate your level of motivation for including exercise in your routine (0=none, 10=high): 5   Do you have any medical conditions, pain, injuries, surgeries etc. restricting you from exercise? Yes        Sleep History:      6/14/2022    10:12 AM   Sleep   How many hours (on average) do you sleep per night? Less than 4   What time do you turn off the lights? 10 PM   How long does it take for you to fall asleep? 30-45 mins   What time do you stop using electronic devices? 8 PM   What time do you wake up? 5 AM   When do you eat your first meal?  8 AM   Do you feel well-rested during the day?  No   Do you take naps?  No   Do you have a comfortable bedroom environment (cool, quiet, dark, etc)? Yes   Do you have a sleep routine/ ritual that you do before bed?  Yes   How many hours do you spend per day looking at a screen (TV, computer, tablet and phone)? 0 to 2   Select all factors that apply to your current sleep habits: Difficulty falling asleep;Wake up in the middle of the night;Not hungry in the morning        Nutrition History:      6/14/2022    10:12 AM   Nutrition   Have you  ever had a nutrition consultation? Yes   Do you currently follow a special diet or nutritional program? No   What do you feel are the biggest barriers getting in the way of achieving you nutritional goals? Other   Do you have any food allergies, sensitivities or intolerances?  Yes   Specific food(s) causing adverse reactions Gluten           6/14/2022    10:12 AM   Digestion   Do you experience stomach pains/cramping? Daily   Do you experience bloating?  Daily   Do you experience gas?  Daily   Do you experience heartburn/acid reflux/indigestion? Rarely   How often do you have a bowel movement? Once per week or less   What is a typical bowel movement like for you? Select all that apply: Hard to pass          6/14/2022    10:12 AM   Food Access:    Who does the grocery shopping? Self   How often is grocery shopping done? Weekly   Where do you usually receive your groceries from? Select all that apply: [employer]   Do you read food labels? Yes   What do you look at?  Other   Who does the cooking? Select all that apply: Self   How many meals do you eat out per week?  0 to 1   What restaurants do you typically choose? Other          6/14/2022    10:12 AM   Daily Patterns:   How many days per week do you have breakfast? 4   How many days per week do you have lunch? 4   How many days per week do you have dinner? 4   How many days per week do you have snacks? 0          6/14/2022    10:12 AM   Protein Intake:   How many times per day do you typically consume a protein source(s)? 3   What types of protein do you currently eat?  Other Soy/Meat Alternative           6/14/2022    10:12 AM   Fat Intake:    How many times per day do you typically consume healthy fat(s)? 0   What types of health fats do you currently eat? Select all that apply:  Peanut butter           6/14/2022    10:12 AM   Fruit Intake:    How many times per day do you typically consume fruits? 2   What types of fruit do currently eat? Pineapple            6/14/2022    10:12 AM   Vegetable Intake:    How many times per day do you typically consume vegetables? 0   What types of vegetables do you currently eat? Broccoli          6/14/2022    10:12 AM   Grain Intake:    How many times per day do you typically consume grains? 0   What types of grains do currently eat? Select all that apply:  Other (non-gluten free)           6/14/2022    10:12 AM   Dairy Intake:    How many times per day do you typically consume dairy? 1   What types of dairy do currently eat? Select all that apply:  Yogurt           6/14/2022    10:12 AM   Non-Dairy Alternative Intake:    How many times per day do you typically consume non-dairy alternatives? 0   What types of non-dairy alternatives do currently eat? Select all that apply:  Oat milk           6/14/2022    10:12 AM   Sweets Intake:    How many times per day do you typically consume sweets? 0          6/14/2022    10:12 AM   Beverage Intake:    How many 8 oz cups of water do you typically consume per day?  2 to 3   How many 8 oz cups of caffeine do you typically consume per day?  1 to 2   How many drinks of alcohol do you typically consume per week (1 drink = 5 oz wine, 12 oz beer, 1.5 oz spirits)?   0           6/14/2022    10:12 AM   Lifestyle Recall:    What time did you wake up? 5 AM   What time did you go to sleep? 10 PM   What time did you have breakfast? 8-9 AM   Where did you have breakfast?  Work   What time did you have a morning snack? No snack   What time did you have lunch? 11AM-12 PM   Where did you have lunch?  Work   What time did you have an afternoon snack? No snack   What time did you have dinner? 6-7 PM   Where did you have dinner?  Home   What time did you have an evening snack? No Snack   What time of day did you exercise? No Exercise          Additional concerns: Never really hungry so eats sometimes gluten free becca cracker or cheese stick   Breakfast: Makes herself have a protein drink fair life drink   Lunch:  fair life drink   Dinner: Egg whites with piece of toast - yolks seem to be harder to digest     To help increase calories does crackers or cheese stick     When she was hunger she got dx with wheat intolerance before knowing more about celiac disease. She will play around with gluten free but doesn't avoid consistently. She hasn't had a lot of foods since being on the feeding tube. She was just told to eat what she can and see what affects her. She feels like everything hurts so didn't get to try various foods.     Discussed doing some more pureed foods to help with digestion and transfer to solids after tube feeding a year ago. She notices that with certain protein drinks she gets cramps. She has been doing the fair light because it seems to be easier to digest.     [email or web address]-[email or web address]/[email or web address]    MEDICATIONS:  Current Outpatient Medications   Medication Sig Dispense Refill     albuterol (PROAIR HFA/PROVENTIL HFA/VENTOLIN HFA) 108 (90 Base) MCG/ACT inhaler Inhale 1-2 puffs into the lungs every 4 hours as needed for shortness of breath / dyspnea or wheezing       albuterol (PROVENTIL) (2.5 MG/3ML) 0.083% neb solution Take 2.5 mg by nebulization every 4 hours as needed for shortness of breath / dyspnea or wheezing       ALPRAZolam (XANAX) 0.5 MG tablet Take 0.5 mg by mouth 2 times daily as needed for anxiety       amitriptyline (ELAVIL) 25 MG tablet Take 1 tablet (25 mg) by mouth At Bedtime 30 tablet 2     fluticasone (FLOVENT HFA) 110 MCG/ACT inhaler Inhale 2 puffs into the lungs 2 times daily       ibuprofen (ADVIL/MOTRIN) 100 MG/5ML suspension Take 600 mg by mouth every 6 hours as needed for fever or moderate pain       levonorgestrel (MIRENA) 20 MCG/24HR IUD 1 each by Intrauterine route once       methocarbamol (ROBAXIN) 750 MG tablet Take 1 tablet (750 mg) by mouth 4 times daily as needed for muscle spasms 90 tablet 1     ondansetron (ZOFRAN) 4 MG tablet Take by mouth every 6 hours as needed  for nausea       sodium chloride, PF, (SALINE FLUSH) 0.9% PF flush 3 mLs by Intracatheter route every 8 hours       tiZANidine (ZANAFLEX) 2 MG tablet Take 1 tablet (2 mg) by mouth 3 times daily as needed for muscle spasms 90 tablet 2     traMADol (ULTRAM) 50 MG tablet Take 1 tablet (50 mg) by mouth 2 times daily as needed for severe pain 20 tablet 0     Vitamin D3 (CHOLECALCIFEROL) 25 mcg (1000 units) tablet Take 1 tablet by mouth daily              View : No data to display.                  ALLERGIES:   Allergies   Allergen Reactions     Nkda [No Known Drug Allergies]         .na  LABS:  Last Basic Metabolic Panel:  Lab Results   Component Value Date     12/16/2021     06/23/2021     06/22/2021     06/21/2021      Lab Results   Component Value Date    POTASSIUM 3.8 12/16/2021    POTASSIUM 4.7 06/23/2021    POTASSIUM 4.6 06/22/2021    POTASSIUM 4.5 06/21/2021     Lab Results   Component Value Date    CHLORIDE 108 12/16/2021    CHLORIDE 99 06/23/2021    CHLORIDE 97 06/22/2021    CHLORIDE 100 06/21/2021     Lab Results   Component Value Date    MARTIN 8.9 12/16/2021    MARTIN 9.5 06/23/2021    MARTIN 9.4 06/22/2021    MARTIN 9.4 06/21/2021     Lab Results   Component Value Date    CO2 24 12/16/2021    CO2 28 06/23/2021    CO2 29 06/22/2021    CO2 29 06/21/2021     Lab Results   Component Value Date    BUN 8 12/16/2021    BUN 17 06/23/2021    BUN 15 06/22/2021    BUN 14 06/21/2021     Lab Results   Component Value Date    CR 0.49 12/16/2021    CR 0.61 06/23/2021    CR 0.52 06/22/2021    CR 0.49 06/21/2021     Lab Results   Component Value Date     12/16/2021     06/23/2021    GLC 99 06/22/2021     06/21/2021       Last Glucose Profile:   No results found for: A1C    Last Lipid Profile:   No results found for: CHOL  No results found for: HDL  No results found for: LDL  No results found for: TRIG  No results found for: CHOLHDLRATIO    Most recent CBC:  Recent Labs   Lab Test  12/16/21  1730 06/23/21  0744 06/22/21  0724   WBC 5.2 6.1 7.7   HGB 12.0 8.3* 8.3*   HCT 35.6 28.1* 27.5*    540* 566*     Most recent hepatic panel:  Recent Labs   Lab Test 12/16/21  1729 06/23/21  0744   ALT 18 27   AST 12 23     Most recent creatinine:  Recent Labs   Lab Test 12/16/21  1729 06/23/21  0744   CR 0.49* 0.61       No components found for: GFRESETIMATEDLASTLAB(gfrestblack:1@  Lab Results   Component Value Date    ALBUMIN 4.1 12/16/2021    ALBUMIN 2.6 06/23/2021       Last Thyroid Profile:   No results found for: TSH, T4    Last Mineral Profile:   No results found for: MELISSA, IRON, FEB    Autoimmune & Inflammatory   CRP Inflammation   Date Value Ref Range Status   06/23/2021 26.0 (H) 0.0 - 8.0 mg/L Final         Last Vitamin Profile:   No results found for: ICL652, TDDX106, [id number], VITD3, D2VIT, D3VIT, DTOT, [id number], [id number], [id number], [id number], [id number], [id number]    ANTHROPOMETRICS:  Vitals:   BP Readings from Last 1 Encounters:   06/09/22 129/79     Pulse Readings from Last 1 Encounters:   06/09/22 77     Estimated body mass index is 23.06 kg/m  as calculated from the following:    Height as of 3/18/22: 1.829 m (6').    Weight as of 3/18/22: 77.1 kg (170 lb).    Wt Readings from Last 5 Encounters:   03/18/22 77.1 kg (170 lb)   09/08/21 88.5 kg (195 lb)   07/26/21 90.7 kg (200 lb)   07/22/21 90.7 kg (200 lb)   07/19/21 90.7 kg (200 lb)     NUTRITION DIAGNOSIS:    1. Altered GI function related to potential food sensitives/intolerances as evidenced by abdominal pain, gas, bloating and constipation when trying to implement foods after tube feeding.     2.  Inadequate oral intake related to nausea and vomiting as evidenced by food recall/patient statement of poor appetite due to pancreatitis.       NUTRITION INTERVENTION:    Long Term Goals:   Goal: Increase ability to tolerate solid foods   Goal: Decrease digestive symptoms -constipation, gas, bloating and stomach aches  Goal:  Decrease potential nutrient deficiencies        Short Term Goals:  Goal 1: Some improvement Shown: Focus on starting smoothie for breakfast - see the recipe provided in handouts and book below under resources - add in unsweetened flax, hemp or coconut milk or filtered water to start, 1 tsp- 1 tbsp of healthy fat such as flax seed ground or yvette seed ground (slowly introduce fiber as tolerated).1 scoop of plant based protein powder or collagen powder, 1 serving of lowfodmap fruit and veggie such as kale or, spinach       Also, check out some of the other breakfast ideas provided such as oatmeal (naturally gluten free) with dairy free alternative milk, flax seed, nuts such as walnuts, fruit such as berries and protein like eggs or chicken/turkey sausage.     Avocado on slice of gluten free bread recommend base culture brand      https://[employer]/?[email or web address]     Birch chapman brand for pancakes - paleo  https://[employer]/collections/pancake-waffle-mix/products/paleo     Try siete soft taco shells with eggs and veggies in am for breakfast   https://[employer]/collections/tortillas/products/kczcah-snwgi-qmruwwrve-6-pack        Goal 2: Needs Improvement: Make sure to eat some snacks free of most reactive foods with balanced protein, fiber lower carb, healthy fats least 1-2 snacks daily around 10am and 3pm. Trying to incorporate more soft foods to start to help with better digestion.       Some Examples:   Beef Jerky with veggie or fruit   https://[employer].net/recipe/3-ingredient-yvette-pudding/     [place] Chips or peeled cucumber with hummus      Hummus with veggie      Fruit with nut butter - banana with sunflower seed butter or applesauce with almond butter         Goal 3: Improvement Shown needs to revisit as needed:  Track what you eat. Writing down or tracking through an serena what you eat as well as how  you feel and help you identify patterns in your symptoms. This can help you become more aware and create a diet that is right for you.    Pick a food tracking serena:          Through the [employer] serena, you can track symptoms, bowel movements, medications, stress, exercise, sleep and foods as well as beverages to become more mindful. Https://[employer]/[email or web address]    **track by paper if serena feels like too much. Make list of foods that cause symptoms to provide and review at follow up. I recommend the dailygreatness journal as you can track your goals, mindset, foods etc to keep you on track and motivated.     Goal 4: Stat to reintroduce foods as tolerated with emphasis on dinner at least 2-3 new meals per week along with snacks and having some of the dinner leftover for lunch.   Try to increase soups that are more anti-inflammatory and lower in fat. Adjust fat and fiber needs as tolerated.    Goal 5: Take supplements to address potential deficiencies:   1. Start multivitamin daily by pure encapsulations ONE MULTI - take 1 capsule with food.   2. Start the digestive enzyme 1 capsule with food as tolerated and increase to two capsules as needed.   Pure Encapsulations Digestive Enzymes Ultra  Supplement to Aid in Breaking Down Fats, Proteins, and Carbohydrates for Digestion*  180 Capsules     3. Start taking probiotic soil based to start - 30 billion by rao fajardo md     Goal 6: Increase water intake as increasing fiber in diet. Currently getting 4 8oz glasses of water per day and increase another 4 8oz glasses.      What is Pancreatitis?    Your pancreas helps you regulate the way that your body processes sugar. It also serves an important function in releasing enzymes and helping you digest food.    When your pancreas becomes swollen or inflamed, it cannot perform its function. This condition is called pancreatitis.    Because the pancreas is so closely tied to your digestive process, it s affected by what you choose  to eat. In cases of acute pancreatitis, pancreas inflammation is often triggered by gallstones.    But in cases of chronic pancreatitis, in which flare-ups recur over time, your diet might have a lot to do with the problem. Researchers are finding out more about foods you can eat to protect and even help to heal your pancreas.    What to eat if you have pancreatitis  To get your pancreas healthy, focus on foods that are rich in protein, low in animal fats, and contain antioxidants. Try lean meats, beans and lentils, clear soups, and dairy alternatives (such as flax milk and almond milk). Your pancreas won t have to work as hard to process these.    Research suggests that some people with pancreatitis can tolerate up to 30 to 40% of calories from fat when it s from whole-food plant sources or medium-chain triglycerides (MCTs). Others do better with much lower fat intake, such as 50 grams or less per day.    Spinach, blueberries, cherries, and whole grains can work to protect your digestion and fight the free radicals that damage your organs.    If you re craving something sweet, reach for fruit instead of added sugars since those with pancreatitis are at high risk for diabetes.    Consider cherry tomatoes, cucumbers and hummus, and fruit as your go-to snacks. Your pancreas will thank you.      What not to eat if you have pancreatitis  Foods to limit include (check out the IFM elimination guide provided for meal plan with recipes and guide for avoiding common reactive foods):       red meat    organ meats    fried foods    fries and potato chips    mayonnaise    margarine and butter    full-fat dairy    pastries and desserts with added sugars    beverages with added sugars    If you re trying to combat pancreatitis, avoid trans-fatty acids in your diet.    Fried or heavily processed foods, like french fries and fast-food hamburgers, are some of the worst offenders. Organ meats, full-fat dairy, potato chips, and  mayonnaise also top the list of foods to limit.    Cooked or deep-fried foods might trigger a flare-up of pancreatitis. You ll also want to cut back on the refined flour found in cakes, pastries, and cookies. These foods can tax the digestive system by causing your insulin levels to spike.    Pancreatitis recovery diet  If you re recovering from acute or chronic pancreatitis, avoid drinking alcohol. If you smoke, you ll also need to quit. Focus on eating a low-fat diet that won t tax or inflame your pancreas.    You should also stay hydrated. Keep an electrolyte beverage or a bottle of water with you at all times. You can even try some coconut water (look for non added sugar as it naturally is already high in sugar)    People with chronic pancreatitis often experience malnutrition due to their decreased pancreas function. Vitamins A, D, E, and K are most commonly found to be lacking as a result of pancreatitis. I recommend that we start taking pure encapsulations ONE Multi to provide some nutrients in just one capsule.     Diet tips  Always check with your doctor or dietician before changing your eating habits when you have pancreatitis. Here are some tips they might suggest:      Eat between six and eight small meals throughout the day to help recover from pancreatitis. This is easier on your digestive system than eating two or three large meals.    Use MCTs as your primary fat since this type of fat does not require pancreatic enzymes to be digested. MCTs can be found in coconut oil and palm kernel oil and is available at most health food stores.    Avoid eating too much fiber at once, as this can slow digestion and result in less-than-ideal absorption of nutrients from food. Fiber may also make your limited amount of enzymes less effective.    Take a multivitamin supplement to ensure that you re getting the nutrition you need.       Avoid Common Trigger foods. Dairy tops the list. The proteins like casein and  whey in dairy can irritate and inflame your gut, while gluten the protein in wheat, rye and barley is a close second. Give those foods up for at least 3-6 months and see if digestion, blood sugars, weight and overall health improve.      REINTRODUCE    Eat real anti-inflammatory foods. When you eat whole, real foods in their unprocessed forms, you take the first step to healing our gut and overall health. Eat plenty of vegetables, fruits, non-gluten whole grains (monitor for symptoms), beans, nuts, seeds, lean meats, healthy fats and other plant foods.    Start following reintroduction phase (see guide for details) of elimination diet to see if certain foods trigger symptoms. If you are avoiding FODMAPS focus on keeping out wheat and gluten and then add in cashew butter or high fodmap nuts 1 serving at a time to see if this high fodmap food for example gives you issues. The same can be done for nightshades. Tracking your foods and symptoms can be beneficial in helping you identify patterns while becoming more aware of what you eat.    Focus on Reintroduction Diet: Introduce foods only one food at a time for one day, followed by a 24 hour observation period. I like to focus on keeping the food out for at least 3-4 days and monitor symptoms. If no reactions occur, add in another food.  It's important to wait till symptoms subside before you add in another food to help you better pinpoint a trigger food.      Food plan - 1,400-1,800calories per day          Protein 9-10 servings per day - include at each meal to stabilize blood sugars  (Choose 3oz or 21g per meal and aim for 1oz of 7g for snacks)  -       Strive for 1-2 servings of fish per week especially of higher omega-3 fatty acid containing fish such as salmon.          Legumes 1-2 serving per day (monitor for digestive issues try hummus first)          Dairy alternatives 1-2 serving per day          nuts and seeds 3-4 servings per day - great to incorporate as  snacks - nut butters/seed butters and or powders and or sprouted nuts/seeds may be easier to digest and experiment with          Fats and oils 4 servings per day          Non starchy vegetables 7-8 servings per day          Starchy vegetable limit 1 serving per day as they tend to impact blood sugar (they are moderate-GI).          Fruits 2 servings per day - best to couple with a little bit of protein or fat to offset a rise in blood sugars (they are low-moderate-GI foods and monitor if high fodmap are harder to tolerate or trigger symptoms)          Whole grains 1-2 serving per day - try gluten free whole grains instead (focus on cutting back on wheat to see if this higher fodmap food triggers symptoms)       Incorporate protein powder daily:          Plant based hemp (recommended brands: [place], Nutiva, Just Hemp Protein, Adelfo's Red Mill)           Plant based pea (recommended brands: Naked Pea, Now Sports). If you want to try a combo of pea and hemp the brand Castano in vanilla or chocolate is a great option.          Try Bone broth protein powder or collagen peptides in liquid bone broth, vegetable broth or 12 oz of water as snack. The bone broth powder and collagen can be used for soups as well. This can help provide essential amino acids and minerals that heal your gut as well as balance blood sugars. A great option if you have a hard time tolerating solids.    Can also consider pre made shakes if unable to make smoothies.      Brand examples that are gluten and dairy free to try:   1) Orgain Vegan Protein Shakes, 20g of Plant Based Protein, Creamy Chocolate - Gluten Free, No Dairy, Soy, or Preservatives, No Added Sugar  2) Pirq, Vegan Protein Shake, Turmeric Curcumin, Brie, Plant-Based Protein Drink, Gluten-Free, Dairy-Free, Soy-Free, Non-GMO, Vegetarian, Kosher, Keto, Low Carb, Low Calorie  3) OWYN Pro Elite Vegan Plant-Based High Protein Shake, 35g Protein, 9 Amino Acids, Omega-3, Prebiotics,  Superfoods Greens for Workout and Recovery, 0g Net Carbs, Zero Sugar, Keto  4) Ripple Vegan Protein Shake  Chocolate  20g Nutritious Plant Based Pea Protein  Shelf Stable  No GMOs, Soy, Nut, Gluten, Lactose  5) KatherineCandescent Healing Glucose Support 1.2 Plant based, dairy free, low glycemic index  https://[employer].Larotec/products/glucose-support-1-2-vanilla    Choose Low Glycemic (GI) foods: Regulate your sugar levels by eating foods that do not spike blood sugars.  Eat low -GI foods so only small fluctuations in blood glucose and insulin levels are produced.     Examples of low-GI foods: nuts, seeds, GF oats, most vegetables especially non-starchy and fruits.    Medium or high-GI foods should be eaten with a protein or fat, both of which blunt the glycemic effect of these foods. This reduces the overall glycemic impact of a meal.  Ex: Most grains and starchy veggies are medium/high GI.    Avoid foods containing refined sugars, artificial sweeteners, and refined grains they are considered high-GI because they lead to sharp increases in blood sugars levels, which increase insulin sensitivity causing increased TG, and low good cholesterol (HDL).  Ex: cakes, cookies, pies, bread, sodas, fruit drinks, presweetened tea, coffee drinks, energy or sport drinks, flavored milk and other processed foods.      Drink more water. Hydration is critical, so drink at least six to eight glasses of water a day. Drink more water between meals and less at meals.   Alcohol and caffeine can stimulate your intestines, which may cause diarrhea. Artificial sweeteners that contain sugar alcohols such as sorbitol, mannitol and xylitol may cause diarrhea too. Carbonated drinks can produce gas.    Fiber draws water from your body to move foods through your intestine. Without enough  water and fluids, you may become constipated.    Try adding herbal teas (sugar free) or lemon/lime/cucumber/fruit to water for flavor. Avoid artificial sweetener packets  to flavor your water.    Cut back on coffee switch to green tea. Avoid adding sugar and milk to coffee instead use dairy alternatives such as almond, flax, coconut milk.Try another coffee substitute such as   -https://LYSOGENE.Conelum/  -https://Narus.Fashion Playtes      Choose foods high in fiber: Aim for at least 5 grams of fiber per serving of food or a total of 25-35 grams fiber per day. Remember, when looking at the label, you can take the fiber away from the total carbs. Ex:15g of total carbs - 4g of fiber = 11g net carbs    Insoluble fiber acts like a bulky  inner broom,  sweeping out debris from the intestine and creating more motility and movement.     Soluble fiber attracts water and swells, creating a gel that slows digestion.  Also, slows the release of glucose from foods into the blood which stops spikes in blood sugar levels.  Soluble fiber traps toxins in the gut, helping to carry them to excretion and provides healthy bacteria in the digestive tract.        NUTRITION RESOURCES:          IFM Elimination Diet - Recipes, guide, meal plan, supplement handouts  Book recommendations:    1. PANCREATITIS RELIEF SMOOTHIES: COMPREHENSIVE GUIDE PLUS SMOOTHIES RECIPES TO RELIEF PANCREATITIS FOR HEALTHY LIVING by  LILLIAN ROJO PH.D  2. Pancreatitis Cookbook: The Ultimate Pancreatitis Guide with More Than 120 Easy & Delicious Pancreatitis Diet Recipes to Improve Your Enzymes and Health. 21 Day Pancreatic Meal Plan Included.by by Darlene Kennedy   3. The Healing Soup Cookbook: Hearty Recipes to Boost Immunity and Restore Health by by LEATHA Pickard, MS, RD             PATIENT'S BEHAVIOR CHANGE GOALS:   See nutrition intervention for patient stated behavior change goals.     MONITOR / EVALUATE:  Registered Dietitian will monitor/evaluate the following:     Beliefs and attitudes related to food    Food and nutrition knowledge / skills    Food / Beverage / Nutrient intake     Pertinent Labs    Progress toward meeting stated  nutrition-related goals    Readiness to change nutrition-related behaviors    Weight change    Digestion     COORDINATION OF CARE:  Follow up with referring provider as needed       FOLLOW-UP:  Follow-up appointment scheduled on June 13th at 11am video visit    Time spent in minutes: 45 minutes  units   Encounter: Individual    Ailyn Mattson RD, CLT, LD  Integrative Registered Dietitian

## 2023-04-13 NOTE — PROGRESS NOTES
Called PT and confirmed Appt.     Called to remind patient of their upcoming appointment with our GI clinic, on 04/17/23 at 3:45 PM with Dr. Johny Aguiar. This appointment is scheduled as a video visit. You will receive a call approximately 30 minutes prior to check you in, you must be in [place] for this visit., if your appointment is virtual (video or telephone) you need to be in [place] for the visit. To reschedule or cancel patient to call [phone].        SK

## 2023-04-13 NOTE — PROGRESS NOTES
Called PT and left VM.     Called to remind patient of their upcoming appointment with our GI clinic, on 04/17/23 at 3:45 PM with Dr. Johny Aguiar. This appointment is scheduled as a video visit. You will receive a call approximately 30 minutes prior to check you in, you must be in [place] for this visit., if your appointment is virtual (video or telephone) you need to be in [place] for the visit. To reschedule or cancel patient to call [phone].        SK

## 2023-04-17 ENCOUNTER — VIRTUAL VISIT (OUTPATIENT)
Dept: GASTROENTEROLOGY | Facility: CLINIC | Age: 43
End: 2023-04-17
Payer: COMMERCIAL

## 2023-04-17 DIAGNOSIS — G89.29 CHRONIC ABDOMINAL PAIN: Primary | ICD-10-CM

## 2023-04-17 DIAGNOSIS — R10.9 CHRONIC ABDOMINAL PAIN: Primary | ICD-10-CM

## 2023-04-17 PROCEDURE — 99215 OFFICE O/P EST HI 40 MIN: CPT | Mod: VID | Performed by: INTERNAL MEDICINE

## 2023-04-17 NOTE — LETTER
4/17/2023         RE: Alexandra Nunez  [address] [place] [address]        Dear Colleague,    Thank you for referring your patient, Alexandra Nunez, to the [facility] [place]. Please see a copy of my visit note below.        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  All resolved drains removed follow-up CT negative but has developed an intractable chronic pain syndrome since then which has been very draining.  She is seeing our pain clinic had a celiac block which aggravated then relieved the pain only for a few weeks.  She has not on no specific pain meds but is on muscle relaxant and anticholinergic.  Her pain is 6-7 on a daily basis works full-time but is exhausted at the end of the day and unable to participate in social situations.    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    Impression is suspected chronic pancreatitis resulting from severe necrotizing sentinel episode  Intractable pain syndrome    Recommend endoscopic ultrasound with secretin stimulated pancreatic function tests to assess for chronic pancreatitis.  Follow-up with me afterwards    Joined the call at 4/17/2023, 3:45:59 pm.  Left the call at 4/17/2023, 4:18:05 pm.  You were on the call for 32 minutes 5 seconds .    Past Medical, Surgical, Family, and Social Histories:    Past Medical History:   Diagnosis Date    Pacemaker        Past Surgical History:   Procedure Laterality Date    INJECT NERVE BLOCK CELIAC PLEXUS Bilateral 3/18/2022    Procedure: Celiac Plexus Block;   Surgeon: Carla Boyd MD;  Location: [facility] OR    IR SINOGRAM INJECTION DIAGNOSTIC  7/12/2021    IR SINOGRAM INJECTION DIAGNOSTIC  7/22/2021    IR SINOGRAM INJECTION DIAGNOSTIC  8/12/2021    IR SINOGRAM INJECTION DIAGNOSTIC  8/25/2021    IR SINOGRAM INJECTION DIAGNOSTIC  9/22/2021    IR SINOGRAM INJECTION DIAGNOSTIC  8/19/2021    IR SINOGRAM INJECTION THERAPEUTIC  8/5/2021    IR SINOGRAM INJECTION THERAPEUTIC  9/8/2021       No family history on file.    Social History     Tobacco Use    Smoking status: Never    Smokeless tobacco: Never   Vaping Use    Vaping status: Not on file   Substance Use Topics    Alcohol use: Not Currently       Again, thank you for allowing me to participate in the care of your patient.        Sincerely,        Johny Aguiar MD

## 2023-04-17 NOTE — PROGRESS NOTES
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  All resolved drains removed follow-up CT negative but has developed an intractable chronic pain syndrome since then which has been very draining.  She is seeing our pain clinic had a celiac block which aggravated then relieved the pain only for a few weeks.  She has not on no specific pain meds but is on muscle relaxant and anticholinergic.  Her pain is 6-7 on a daily basis works full-time but is exhausted at the end of the day and unable to participate in social situations.    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    Impression is suspected chronic pancreatitis resulting from severe necrotizing sentinel episode  Intractable pain syndrome    Recommend endoscopic ultrasound with secretin stimulated pancreatic function tests to assess for chronic pancreatitis.  Follow-up with me afterwards    Joined the call at 4/17/2023, 3:45:59 pm.  Left the call at 4/17/2023, 4:18:05 pm.  You were on the call for 32 minutes 5 seconds .    Past Medical, Surgical, Family, and Social Histories:    Past Medical History:   Diagnosis Date     Pacemaker        Past Surgical History:   Procedure Laterality Date     INJECT NERVE BLOCK CELIAC PLEXUS Bilateral 3/18/2022    Procedure: Celiac Plexus Block;  Surgeon: Carla Boyd MD;  Location: [facility] OR     IR SINOGRAM INJECTION DIAGNOSTIC  7/12/2021     IR SINOGRAM INJECTION DIAGNOSTIC  7/22/2021     IR SINOGRAM INJECTION DIAGNOSTIC  8/12/2021     IR SINOGRAM INJECTION DIAGNOSTIC  8/25/2021     IR SINOGRAM INJECTION  DIAGNOSTIC  9/22/2021     IR SINOGRAM INJECTION DIAGNOSTIC  8/19/2021     IR SINOGRAM INJECTION THERAPEUTIC  8/5/2021     IR SINOGRAM INJECTION THERAPEUTIC  9/8/2021       No family history on file.    Social History     Tobacco Use     Smoking status: Never     Smokeless tobacco: Never   Vaping Use     Vaping status: Not on file   Substance Use Topics     Alcohol use: Not Currently

## 2023-04-17 NOTE — PROGRESS NOTES
Virtual Visit Details    Type of service:  Video Visit     Originating Location (pt. Location): Home  Distant Location (provider location):  Off-site  Platform used for Video Visit: Shaneka

## 2023-04-17 NOTE — NURSING NOTE
Is the patient currently in the state of [place]? YES    Visit mode:VIDEO    If the visit is dropped, the patient can be reconnected by: VIDEO VISIT: Text to cell phone: [phone]    Will anyone else be joining the visit? NO      How would you like to obtain your AVS? MyChart    Are changes needed to the allergy or medication list? NO    Reason for visit: Video Visit

## 2023-04-18 NOTE — PATIENT INSTRUCTIONS
NUTRITION INTERVENTION:    Long Term Goals:   Goal: Increase ability to tolerate solid foods   Goal: Decrease digestive symptoms -constipation, gas, bloating and stomach aches  Goal: Decrease potential nutrient deficiencies        Short Term Goals:  Goal 1: Some improvement Shown: Focus on starting smoothie for breakfast - see the recipe provided in handouts and book below under resources - add in unsweetened flax, hemp or coconut milk or filtered water to start, 1 tsp- 1 tbsp of healthy fat such as flax seed ground or yvette seed ground (slowly introduce fiber as tolerated).1 scoop of plant based protein powder or collagen powder, 1 serving of lowfodmap fruit and veggie such as kale or, spinach       Also, check out some of the other breakfast ideas provided such as oatmeal (naturally gluten free) with dairy free alternative milk, flax seed, nuts such as walnuts, fruit such as berries and protein like eggs or chicken/turkey sausage.     Avocado on slice of gluten free bread recommend CORP80      [email or web address]     Birch chapman brand for pancakes - paleo  https://[employer]/collections/pancake-waffle-mix/products/paleo     Try siete soft taco shells with eggs and veggies in am for breakfast   https://[employer]/collections/tortillas/products/qivhge-eswes-dwuscifwg-6-pack        Goal 2: Needs Improvement: Make sure to eat some snacks free of most reactive foods with balanced protein, fiber lower carb, healthy fats least 1-2 snacks daily around 10am and 3pm. Trying to incorporate more soft foods to start to help with better digestion.       Some Examples:   Beef Jerky with veggie or fruit   https://Acopia Networksgoodfoodie.net/recipe/3-ingredient-yvette-pudding/     [place] Chips or peeled cucumber with hummus      Hummus with veggie      Fruit with nut butter - banana with sunflower seed butter or  applesauce with almond butter         Goal 3: Improvement Shown needs to revisit as needed:  Track what you eat. Writing down or tracking through an serena what you eat as well as how you feel and help you identify patterns in your symptoms. This can help you become more aware and create a diet that is right for you.    Pick a food tracking serena:         Through the Trend[employer]ol serena, you can track symptoms, bowel movements, medications, stress, exercise, sleep and foods as well as beverages to become more mindful. Https://[employer]/[email or web address]    **track by paper if serena feels like too much. Make list of foods that cause symptoms to provide and review at follow up. I recommend the dailygreatness journal as you can track your goals, mindset, foods etc to keep you on track and motivated.     Goal 4: Stat to reintroduce foods as tolerated with emphasis on dinner at least 2-3 new meals per week along with snacks and having some of the dinner leftover for lunch.   Try to increase soups that are more anti-inflammatory and lower in fat. Adjust fat and fiber needs as tolerated.    Goal 5: Take supplements to address potential deficiencies:   1. Start multivitamin daily by pure encapsulations ONE MULTI - take 1 capsule with food.   2. Start the digestive enzyme 1 capsule with food as tolerated and increase to two capsules as needed.   Pure Encapsulations Digestive Enzymes Ultra  Supplement to Aid in Breaking Down Fats, Proteins, and Carbohydrates for Digestion*  180 Capsules     3. Start taking probiotic soil based to start - 30 billion by rao fajardo md     Goal 6: Increase water intake as increasing fiber in diet. Currently getting 4 8oz glasses of water per day and increase another 4 8oz glasses.      What is Pancreatitis?    Your pancreas helps you regulate the way that your body processes sugar. It also serves an important function in releasing enzymes and helping you digest food.    When your pancreas becomes swollen or  inflamed, it cannot perform its function. This condition is called pancreatitis.    Because the pancreas is so closely tied to your digestive process, it s affected by what you choose to eat. In cases of acute pancreatitis, pancreas inflammation is often triggered by gallstones.    But in cases of chronic pancreatitis, in which flare-ups recur over time, your diet might have a lot to do with the problem. Researchers are finding out more about foods you can eat to protect and even help to heal your pancreas.    What to eat if you have pancreatitis  To get your pancreas healthy, focus on foods that are rich in protein, low in animal fats, and contain antioxidants. Try lean meats, beans and lentils, clear soups, and dairy alternatives (such as flax milk and almond milk). Your pancreas won t have to work as hard to process these.    Research suggests that some people with pancreatitis can tolerate up to 30 to 40% of calories from fat when it s from whole-food plant sources or medium-chain triglycerides (MCTs). Others do better with much lower fat intake, such as 50 grams or less per day.    Spinach, blueberries, cherries, and whole grains can work to protect your digestion and fight the free radicals that damage your organs.    If you re craving something sweet, reach for fruit instead of added sugars since those with pancreatitis are at high risk for diabetes.    Consider cherry tomatoes, cucumbers and hummus, and fruit as your go-to snacks. Your pancreas will thank you.      What not to eat if you have pancreatitis  Foods to limit include (check out the IFM elimination guide provided for meal plan with recipes and guide for avoiding common reactive foods):     red meat  organ meats  fried foods  fries and potato chips  mayonnaise  margarine and butter  full-fat dairy  pastries and desserts with added sugars  beverages with added sugars  If you re trying to combat pancreatitis, avoid trans-fatty acids in your  diet.    Fried or heavily processed foods, like french fries and fast-food hamburgers, are some of the worst offenders. Organ meats, full-fat dairy, potato chips, and mayonnaise also top the list of foods to limit.    Cooked or deep-fried foods might trigger a flare-up of pancreatitis. You ll also want to cut back on the refined flour found in cakes, pastries, and cookies. These foods can tax the digestive system by causing your insulin levels to spike.    Pancreatitis recovery diet  If you re recovering from acute or chronic pancreatitis, avoid drinking alcohol. If you smoke, you ll also need to quit. Focus on eating a low-fat diet that won t tax or inflame your pancreas.    You should also stay hydrated. Keep an electrolyte beverage or a bottle of water with you at all times. You can even try some coconut water (look for non added sugar as it naturally is already high in sugar)    People with chronic pancreatitis often experience malnutrition due to their decreased pancreas function. Vitamins A, D, E, and K are most commonly found to be lacking as a result of pancreatitis. I recommend that we start taking pure encapsulations ONE Multi to provide some nutrients in just one capsule.     Diet tips  Always check with your doctor or dietician before changing your eating habits when you have pancreatitis. Here are some tips they might suggest:    Eat between six and eight small meals throughout the day to help recover from pancreatitis. This is easier on your digestive system than eating two or three large meals.  Use MCTs as your primary fat since this type of fat does not require pancreatic enzymes to be digested. MCTs can be found in coconut oil and palm kernel oil and is available at most health food stores.  Avoid eating too much fiber at once, as this can slow digestion and result in less-than-ideal absorption of nutrients from food. Fiber may also make your limited amount of enzymes less effective.  Take a  multivitamin supplement to ensure that you re getting the nutrition you need.       Avoid Common Trigger foods. Dairy tops the list. The proteins like casein and whey in dairy can irritate and inflame your gut, while gluten the protein in wheat, rye and barley is a close second. Give those foods up for at least 3-6 months and see if digestion, blood sugars, weight and overall health improve.      REINTRODUCE    Eat real anti-inflammatory foods. When you eat whole, real foods in their unprocessed forms, you take the first step to healing our gut and overall health. Eat plenty of vegetables, fruits, non-gluten whole grains (monitor for symptoms), beans, nuts, seeds, lean meats, healthy fats and other plant foods.    Start following reintroduction phase (see guide for details) of elimination diet to see if certain foods trigger symptoms. If you are avoiding FODMAPS focus on keeping out wheat and gluten and then add in cashew butter or high fodmap nuts 1 serving at a time to see if this high fodmap food for example gives you issues. The same can be done for nightshades. Tracking your foods and symptoms can be beneficial in helping you identify patterns while becoming more aware of what you eat.    Focus on Reintroduction Diet: Introduce foods only one food at a time for one day, followed by a 24 hour observation period. I like to focus on keeping the food out for at least 3-4 days and monitor symptoms. If no reactions occur, add in another food.  It's important to wait till symptoms subside before you add in another food to help you better pinpoint a trigger food.      Food plan - 1,400-1,800calories per day          Protein 9-10 servings per day - include at each meal to stabilize blood sugars  (Choose 3oz or 21g per meal and aim for 1oz of 7g for snacks)  -       Strive for 1-2 servings of fish per week especially of higher omega-3 fatty acid containing fish such as salmon.          Legumes 1-2 serving per day  (monitor for digestive issues try hummus first)          Dairy alternatives 1-2 serving per day          nuts and seeds 3-4 servings per day - great to incorporate as snacks - nut butters/seed butters and or powders and or sprouted nuts/seeds may be easier to digest and experiment with          Fats and oils 4 servings per day          Non starchy vegetables 7-8 servings per day          Starchy vegetable limit 1 serving per day as they tend to impact blood sugar (they are moderate-GI).          Fruits 2 servings per day - best to couple with a little bit of protein or fat to offset a rise in blood sugars (they are low-moderate-GI foods and monitor if high fodmap are harder to tolerate or trigger symptoms)          Whole grains 1-2 serving per day - try gluten free whole grains instead (focus on cutting back on wheat to see if this higher fodmap food triggers symptoms)       Incorporate protein powder daily:         Plant based hemp (recommended brands: Dataguise, "[employer] Just Hemp Protein, Adelfo's Red Mill)          Plant based pea (recommended brands: Naked Pea, Now Sports). If you want to try a combo of pea and hemp the brand Castano in vanilla or chocolate is a great option.         Try Bone broth protein powder or collagen peptides in liquid bone broth, vegetable broth or 12 oz of water as snack. The bone broth powder and collagen can be used for soups as well. This can help provide essential amino acids and minerals that heal your gut as well as balance blood sugars. A great option if you have a hard time tolerating solids.    Can also consider pre made shakes if unable to make smoothies.      Brand examples that are gluten and dairy free to try:   1) Orgain Vegan Protein Shakes, 20g of Plant Based Protein, Creamy Chocolate - Gluten Free, No Dairy, Soy, or Preservatives, No Added Sugar  2) Pirq, Vegan Protein Shake, Turmeric Curcumin, Brie, Plant-Based Protein Drink, Gluten-Free, Dairy-Free, Soy-Free,  Non-GMO, Vegetarian, Kosher, Keto, Low Carb, Low Calorie  3) OWYN Pro Elite Vegan Plant-Based High Protein Shake, 35g Protein, 9 Amino Acids, Omega-3, Prebiotics, Superfoods Greens for Workout and Recovery, 0g Net Carbs, Zero Sugar, Keto  4) Ripple Vegan Protein Shake  Chocolate  20g Nutritious Plant Based Pea Protein  Shelf Stable  No GMOs, Soy, Nut, Gluten, Lactose  5) Function Space Glucose Support 1.2 Plant based, dairy free, low glycemic index  https://[employer].TapCrowd/products/glucose-support-1-2-vanilla    Choose Low Glycemic (GI) foods: Regulate your sugar levels by eating foods that do not spike blood sugars.  Eat low -GI foods so only small fluctuations in blood glucose and insulin levels are produced.     Examples of low-GI foods: nuts, seeds, GF oats, most vegetables especially non-starchy and fruits.    Medium or high-GI foods should be eaten with a protein or fat, both of which blunt the glycemic effect of these foods. This reduces the overall glycemic impact of a meal.  Ex: Most grains and starchy veggies are medium/high GI.    Avoid foods containing refined sugars, artificial sweeteners, and refined grains they are considered high-GI because they lead to sharp increases in blood sugars levels, which increase insulin sensitivity causing increased TG, and low good cholesterol (HDL).  Ex: cakes, cookies, pies, bread, sodas, fruit drinks, presweetened tea, coffee drinks, energy or sport drinks, flavored milk and other processed foods.      Drink more water. Hydration is critical, so drink at least six to eight glasses of water a day. Drink more water between meals and less at meals.   Alcohol and caffeine can stimulate your intestines, which may cause diarrhea. Artificial sweeteners that contain sugar alcohols such as sorbitol, mannitol and xylitol may cause diarrhea too. Carbonated drinks can produce gas.  Fiber draws water from your body to move foods through your intestine. Without enough  water and  fluids, you may become constipated.  Try adding herbal teas (sugar free) or lemon/lime/cucumber/fruit to water for flavor. Avoid artificial sweetener packets to flavor your water.  Cut back on coffee switch to green tea. Avoid adding sugar and milk to coffee instead use dairy alternatives such as almond, flax, coconut milk.Try another coffee substitute such as   -https://[employer].XIPWIRE/  -https://TeaMobi.Limbo      Choose foods high in fiber: Aim for at least 5 grams of fiber per serving of food or a total of 25-35 grams fiber per day. Remember, when looking at the label, you can take the fiber away from the total carbs. Ex:15g of total carbs - 4g of fiber = 11g net carbs    Insoluble fiber acts like a bulky  inner broom,  sweeping out debris from the intestine and creating more motility and movement.     Soluble fiber attracts water and swells, creating a gel that slows digestion.  Also, slows the release of glucose from foods into the blood which stops spikes in blood sugar levels.  Soluble fiber traps toxins in the gut, helping to carry them to excretion and provides healthy bacteria in the digestive tract.        NUTRITION RESOURCES:         IFM Elimination Diet - Recipes, guide, meal plan, supplement handouts  Book recommendations:    1. PANCREATITIS RELIEF SMOOTHIES: COMPREHENSIVE GUIDE PLUS SMOOTHIES RECIPES TO RELIEF PANCREATITIS FOR HEALTHY LIVING by  LILLIAN ROJO PH.D  2. Pancreatitis Cookbook: The Ultimate Pancreatitis Guide with More Than 120 Easy & Delicious Pancreatitis Diet Recipes to Improve Your Enzymes and Health. 21 Day Pancreatic Meal Plan Included.by by Darlene Kennedy   3. The Healing Soup Cookbook: Hearty Recipes to Boost Immunity and Restore Health by by LEATHA Pickard, MS, RD

## 2023-04-19 ENCOUNTER — PREP FOR PROCEDURE (OUTPATIENT)
Dept: GASTROENTEROLOGY | Facility: CLINIC | Age: 43
End: 2023-04-19
Payer: COMMERCIAL

## 2023-04-19 ENCOUNTER — PATIENT OUTREACH (OUTPATIENT)
Dept: GASTROENTEROLOGY | Facility: CLINIC | Age: 43
End: 2023-04-19
Payer: COMMERCIAL

## 2023-04-19 DIAGNOSIS — R10.9 CHRONIC ABDOMINAL PAIN: Primary | ICD-10-CM

## 2023-04-19 DIAGNOSIS — G89.29 CHRONIC ABDOMINAL PAIN: Primary | ICD-10-CM

## 2023-04-19 NOTE — TELEPHONE ENCOUNTER
Per Dr. Acharya, ok for EUS w/ PFTS on a Monday    Please assist in scheduling:     Procedure/Imaging/Clinic: EUS w/ PFTs  Physician: Dr. Acharya  [id number]  Procedure length:provider average  Anesthesia:gen  Dx: chronic abdominal pain  Tier:2  Location: [facility]     Called to discuss with patient .     Explained they can expect a call from  for date and time of procedure, will need a , someone to stay with them for 24 hours and should stay in town for 24 hours (within 45 min of Hospital) post procedure    Patient needs to get pre-op physical completed. If outside [facility] system will need physical faxed to number [phone]   If you do not get a preop physical, your procedure could be cancelled, patient voiced understanding*    Preop Plan:will see PCP, Clinic, [facility]    Does patient have any history of gastric bypass/gastric surgery/altered panc/bili anatomy?no    Med Review    Blood thinner -  no  ASA - no  Diabetic - no    Patient Education r/t procedure:no    A pre-op nurse will call 1-2 days prior to the procedure.    NPO/Prep:   Adults and Children of all ages may consume solids up to 8 hours prior to arrival time - may consume clear liquids up to 1 hour prior to arrival time.    Other specific details/comments:none     Verbalized understanding of all instructions. All questions answered.     Procedure order placed, message routed to OR / Endo

## 2023-04-25 ENCOUNTER — PATIENT OUTREACH (OUTPATIENT)
Dept: GASTROENTEROLOGY | Facility: CLINIC | Age: 43
End: 2023-04-25

## 2023-04-25 ENCOUNTER — MYC MEDICAL ADVICE (OUTPATIENT)
Dept: PALLIATIVE MEDICINE | Facility: CLINIC | Age: 43
End: 2023-04-25

## 2023-04-25 NOTE — PROGRESS NOTES
Called patient to reschedule procedure with Dr. Acharya due to scheduling conflict. Per Dr. Acharya:Ok for EUS w/ PFTS on a Monday.    Intend to offer next available Monday OR: 5/22/23.    Unable to reach patient at this time. LVM.     Neyda Juares, RN Care Coordinator

## 2023-05-01 NOTE — PROGRESS NOTES
Spoke with patient. She is agreeable to reschedule for 5/22. Patient is working with our financial services to clarify insurance coverage prior to procedure.     Neyda Juares, RN Care Coordinator

## 2023-05-03 ENCOUNTER — VIRTUAL VISIT (OUTPATIENT)
Dept: ANESTHESIOLOGY | Facility: CLINIC | Age: 43
End: 2023-05-03
Payer: COMMERCIAL

## 2023-05-03 DIAGNOSIS — M79.18 MYOFASCIAL PAIN: ICD-10-CM

## 2023-05-03 PROCEDURE — 99214 OFFICE O/P EST MOD 30 MIN: CPT | Mod: VID | Performed by: ANESTHESIOLOGY

## 2023-05-03 RX ORDER — METHOCARBAMOL 750 MG/1
750 TABLET, FILM COATED ORAL 4 TIMES DAILY PRN
Qty: 90 TABLET | Refills: 1 | Status: SHIPPED | OUTPATIENT
Start: 2023-05-03 | End: 2023-07-07

## 2023-05-03 ASSESSMENT — PAIN SCALES - GENERAL: PAINLEVEL: SEVERE PAIN (6)

## 2023-05-03 NOTE — NURSING NOTE
Patient presents with:  Follow Up: Follow-up Chronic Pancreatitis Pain      Severe Pain (6)     Pain Medications     Opioid Agonists Refills Start End     traMADol (ULTRAM) 50 MG tablet    0 9/17/2021     Sig - Route: Take 1 tablet (50 mg) by mouth 2 times daily as needed for severe pain - Oral    Class: No Print Out    Notes to Pharmacy: Verbal order to [facility] on file          What medications are you using for pain? Tizandine, Amitriptyline    (New patients only) Have you been seen by another pain clinic/ provider? no    (Return Patients only) What refills are you needing today? no

## 2023-05-03 NOTE — PROGRESS NOTES
"[facility]    Date of visit: 5/3/2023    Chief complaint:   Chief Complaint   Patient presents with     Follow Up     Follow-up Chronic Pancreatitis Pain       Interval history:  Alexandra Nunez was last seen by me on 1/27/2023.      Recommendations/plan at the last visit included:  1. Physical Therapy: Will reschedule with her PT  2. Clinical Health Psychologist to address issues of relaxation, behavioral change, coping style, and other factors important to improvement.  New Rx sent            3. Diagnostic Studies:  Not at this time  4. Medication Management:  Will increase amitriptyline as tolerated to 50mg HS  5. Further procedures recommended: Not at this time, would consider repeat celiac in the future  6. Recommendations to PCP: Increasing amitriptyline, starting PT & pain psychology, referring for acupuncture  7. Follow up: 4 months    Since her last visit, Alexandra Nunez reports:  Feels the same since we spoke in January  She will have a procedure with Dr Aguiar later this month  Pain is in the middle and \"under the ribcage\"   Has tried acupuncture and pain psychology, not PT yet  Pain psychology went well and was told that she has all the tools that she needs    Pain is preventing even from standing sometimes or moving - has to take things day by day    Pain scores:  Pain intensity on average is 6 on a scale of 0-10.     Current pain treatments:   Amitriptyline 25 mg at bedtime - tried increasing to 50 mg but too dizzy so continues at 25 mg at bedtime  Tizanidine       Medications:  Current Outpatient Medications   Medication Sig Dispense Refill     albuterol (PROAIR HFA/PROVENTIL HFA/VENTOLIN HFA) 108 (90 Base) MCG/ACT inhaler Inhale 1-2 puffs into the lungs every 4 hours as needed for shortness of breath / dyspnea or wheezing       albuterol (PROVENTIL) (2.5 MG/3ML) 0.083% neb solution Take 2.5 mg by nebulization every 4 hours as needed for shortness of breath / dyspnea or wheezing  "      ALPRAZolam (XANAX) 0.5 MG tablet Take 0.5 mg by mouth 2 times daily as needed for anxiety       amitriptyline (ELAVIL) 25 MG tablet Take 1 tablet (25 mg) by mouth At Bedtime 30 tablet 2     fluticasone (FLOVENT HFA) 110 MCG/ACT inhaler Inhale 2 puffs into the lungs 2 times daily       ibuprofen (ADVIL/MOTRIN) 100 MG/5ML suspension Take 600 mg by mouth every 6 hours as needed for fever or moderate pain       levonorgestrel (MIRENA) 20 MCG/24HR IUD 1 each by Intrauterine route once       methocarbamol (ROBAXIN) 750 MG tablet Take 1 tablet (750 mg) by mouth 4 times daily as needed for muscle spasms 90 tablet 1     ondansetron (ZOFRAN) 4 MG tablet Take by mouth every 6 hours as needed for nausea       sodium chloride, PF, (SALINE FLUSH) 0.9% PF flush 3 mLs by Intracatheter route every 8 hours       tiZANidine (ZANAFLEX) 2 MG tablet Take 1 tablet (2 mg) by mouth 3 times daily as needed for muscle spasms 90 tablet 2     traMADol (ULTRAM) 50 MG tablet Take 1 tablet (50 mg) by mouth 2 times daily as needed for severe pain 20 tablet 0     Vitamin D3 (CHOLECALCIFEROL) 25 mcg (1000 units) tablet Take 1 tablet by mouth daily         Medical History: any changes in medical history since they were last seen? No    Review of Systems:  The 14 system ROS was reviewed from the intake questionnaire, and is positive for: abdominal pain  Any bowel or bladder problems: stable  Mood: stable    Physical Exam: Virtual Visit  There were no vitals taken for this visit.  General: AAOx3, NAD    Assessment:   Chronic pancreatitis  Abdominal pain    Alexandra Nunez is a 42 year old female who is seen at the pain clinic for follow up for management of her chronic abdominal pain. She feels frustrated that there has not been signficant change or improvement in her symptoms. She is actively working with Dr Aguiar and will be undergoing a gastric ultrasound soon for further diagnosis. We will trial methocarbamol as an alternative to tizanidine  for breakthrough/PRN pain management    Plan:  1. Physical Therapy:  Continue daily HEPs  2. Clinical Health Psychologist to address issues of relaxation, behavioral change, coping style, and other factors important to improvement.  Continue current therapy  3. Diagnostic Studies:  none  4. Medication Management:  Methocarbamol 500 mg QID PRN  5. Further procedures recommended: none  6. Recommendations to PCP: none  7. Follow up: 3 months    Carla Boyd MD    Pain Medicine  Department of Anesthesiology  [facility]

## 2023-05-03 NOTE — PATIENT INSTRUCTIONS
Medications:    methocarbamol (ROBAXIN) 750 MG tablet. Take 1 tablet (750 mg) by mouth 4 times daily as needed for muscle spasms.     *Please provide the clinic with a minium of 1 week notice, on all prescription refills.         Recommended Follow up:      Follow up as needed.           To speak with a nurse, schedule/reschedule/cancel a clinic appointment, or request a medication refill call: [phone]    You can also reach us by [employer]: [email or web address]

## 2023-05-03 NOTE — LETTER
"5/3/2023       RE: Alexandra Nunez  [address] [place] [address]     Dear Colleague,    Thank you for referring your patient, Alexandra Nunez, to the [facility] [place] at [facility]. Please see a copy of my visit note below.    Alexandra is a 42 year old who is being evaluated via a billable video visit.      How would you like to obtain your AVS? MyChart  If the video visit is dropped, the invitation should be resent by: Text to cell phone: [phone]  Will anyone else be joining your video visit? No      [facility]    Date of visit: 5/3/2023    Chief complaint:   Chief Complaint   Patient presents with    Follow Up     Follow-up Chronic Pancreatitis Pain       Interval history:  Alexandra Nunez was last seen by me on 1/27/2023.      Recommendations/plan at the last visit included:  Physical Therapy: Will reschedule with her PT  Clinical Health Psychologist to address issues of relaxation, behavioral change, coping style, and other factors important to improvement.  New Rx sent            Diagnostic Studies:  Not at this time  Medication Management:  Will increase amitriptyline as tolerated to 50mg HS  Further procedures recommended: Not at this time, would consider repeat celiac in the future  Recommendations to PCP: Increasing amitriptyline, starting PT & pain psychology, referring for acupuncture  Follow up: 4 months    Since her last visit, Alexandra Nunez reports:  Feels the same since we spoke in January  She will have a procedure with Dr Aguiar later this month  Pain is in the middle and \"under the ribcage\"   Has tried acupuncture and pain psychology, not PT yet  Pain psychology went well and was told that she has all the tools that she needs    Pain is preventing even from standing sometimes or moving - has to take things day by day    Pain scores:  Pain intensity on average is 6 on a " scale of 0-10.     Current pain treatments:   Amitriptyline 25 mg at bedtime - tried increasing to 50 mg but too dizzy so continues at 25 mg at bedtime  Tizanidine       Medications:  Current Outpatient Medications   Medication Sig Dispense Refill    albuterol (PROAIR HFA/PROVENTIL HFA/VENTOLIN HFA) 108 (90 Base) MCG/ACT inhaler Inhale 1-2 puffs into the lungs every 4 hours as needed for shortness of breath / dyspnea or wheezing      albuterol (PROVENTIL) (2.5 MG/3ML) 0.083% neb solution Take 2.5 mg by nebulization every 4 hours as needed for shortness of breath / dyspnea or wheezing      ALPRAZolam (XANAX) 0.5 MG tablet Take 0.5 mg by mouth 2 times daily as needed for anxiety      amitriptyline (ELAVIL) 25 MG tablet Take 1 tablet (25 mg) by mouth At Bedtime 30 tablet 2    fluticasone (FLOVENT HFA) 110 MCG/ACT inhaler Inhale 2 puffs into the lungs 2 times daily      ibuprofen (ADVIL/MOTRIN) 100 MG/5ML suspension Take 600 mg by mouth every 6 hours as needed for fever or moderate pain      levonorgestrel (MIRENA) 20 MCG/24HR IUD 1 each by Intrauterine route once      methocarbamol (ROBAXIN) 750 MG tablet Take 1 tablet (750 mg) by mouth 4 times daily as needed for muscle spasms 90 tablet 1    ondansetron (ZOFRAN) 4 MG tablet Take by mouth every 6 hours as needed for nausea      sodium chloride, PF, (SALINE FLUSH) 0.9% PF flush 3 mLs by Intracatheter route every 8 hours      tiZANidine (ZANAFLEX) 2 MG tablet Take 1 tablet (2 mg) by mouth 3 times daily as needed for muscle spasms 90 tablet 2    traMADol (ULTRAM) 50 MG tablet Take 1 tablet (50 mg) by mouth 2 times daily as needed for severe pain 20 tablet 0    Vitamin D3 (CHOLECALCIFEROL) 25 mcg (1000 units) tablet Take 1 tablet by mouth daily         Medical History: any changes in medical history since they were last seen? No    Review of Systems:  The 14 system ROS was reviewed from the intake questionnaire, and is positive for: abdominal pain  Any bowel or bladder  problems: stable  Mood: stable    Physical Exam: Virtual Visit  There were no vitals taken for this visit.  General: AAOx3, NAD    Assessment:   Chronic pancreatitis  Abdominal pain    Alexandra Nunez is a 42 year old female who is seen at the pain clinic for follow up for management of her chronic abdominal pain. She feels frustrated that there has not been signficant change or improvement in her symptoms. She is actively working with Dr Aguiar and will be undergoing a gastric ultrasound soon for further diagnosis. We will trial methocarbamol as an alternative to tizanidine for breakthrough/PRN pain management    Plan:  Physical Therapy:  Continue daily HEPs  Clinical Health Psychologist to address issues of relaxation, behavioral change, coping style, and other factors important to improvement.  Continue current therapy  Diagnostic Studies:  none  Medication Management:  Methocarbamol 500 mg QID PRN  Further procedures recommended: none  Recommendations to PCP: none  Follow up: 3 months      Again, thank you for allowing me to participate in the care of your patient.      Sincerely,    Carla Boyd MD

## 2023-05-03 NOTE — PROGRESS NOTES
Alexandra is a 42 year old who is being evaluated via a billable video visit.      How would you like to obtain your AVS? [facility]  If the video visit is dropped, the invitation should be resent by: Text to cell phone: [phone]  Will anyone else be joining your video visit? No

## 2023-05-10 ENCOUNTER — TELEPHONE (OUTPATIENT)
Dept: ANESTHESIOLOGY | Facility: CLINIC | Age: 43
End: 2023-05-10
Payer: COMMERCIAL

## 2023-05-10 DIAGNOSIS — R10.9 CHRONIC ABDOMINAL PAIN: ICD-10-CM

## 2023-05-10 DIAGNOSIS — G89.29 CHRONIC ABDOMINAL PAIN: ICD-10-CM

## 2023-05-10 DIAGNOSIS — K86.1 CHRONIC PANCREATITIS, UNSPECIFIED PANCREATITIS TYPE (H): ICD-10-CM

## 2023-05-10 RX ORDER — TIZANIDINE 2 MG/1
2 TABLET ORAL 3 TIMES DAILY PRN
Qty: 90 TABLET | Refills: 2 | Status: SHIPPED | OUTPATIENT
Start: 2023-05-10 | End: 2023-08-15

## 2023-05-10 NOTE — TELEPHONE ENCOUNTER
RN spoke with pharmacy to confirm remaining refills. Pharmacist confirmed there are no refills remaining and last  was in April. Writer informed a refill would be sent over.    Ashleigh Capone RNCC

## 2023-05-17 ENCOUNTER — PATIENT OUTREACH (OUTPATIENT)
Dept: GASTROENTEROLOGY | Facility: CLINIC | Age: 43
End: 2023-05-17
Payer: COMMERCIAL

## 2023-05-17 NOTE — PROGRESS NOTES
Patient contacted clinic to reschedule procedure on 5/22/23 due to insurance concerns. Agreeable to reschedule for 6/12/23. Pre-op scheduled for 5/18/23.    Neyda Juares RN Care Coordinator

## 2023-06-08 ENCOUNTER — PATIENT OUTREACH (OUTPATIENT)
Dept: GASTROENTEROLOGY | Facility: CLINIC | Age: 43
End: 2023-06-08
Payer: COMMERCIAL

## 2023-06-08 NOTE — PROGRESS NOTES
Called patient to discuss changing the date of her upcoming procedure with Dr. Acharya due to scheduling conflict. Patient agreeable to re-schedule for 6/26/23.     Neyda Juares RN Care Coordinator

## 2023-06-13 ENCOUNTER — VIRTUAL VISIT (OUTPATIENT)
Dept: NUTRITION | Facility: CLINIC | Age: 43
End: 2023-06-13
Payer: COMMERCIAL

## 2023-06-13 DIAGNOSIS — Z71.3 DIETARY COUNSELING AND SURVEILLANCE: Primary | ICD-10-CM

## 2023-06-13 PROCEDURE — 99207 PR NON-BILLABLE SERV PER CHARTING: CPT | Mod: VID | Performed by: DIETITIAN, REGISTERED

## 2023-06-13 NOTE — PROGRESS NOTES
Medical Nutrition Therapy Consult No Show      At time of the appointment, sent video visit link to email and text. Also attempted to reach patient by phone and created personalized message in [employer].     If interested in rescheduling patient can call [phone] to book another consult.     Ailyn Mattson RD, CLT, LD  Integrative Registered Dietitian

## 2023-06-20 ENCOUNTER — MYC MEDICAL ADVICE (OUTPATIENT)
Dept: ANESTHESIOLOGY | Facility: CLINIC | Age: 43
End: 2023-06-20
Payer: COMMERCIAL

## 2023-06-20 DIAGNOSIS — K86.1 CHRONIC PANCREATITIS, UNSPECIFIED PANCREATITIS TYPE (H): ICD-10-CM

## 2023-06-21 NOTE — TELEPHONE ENCOUNTER
Refill request    Medication: amitriptyline (ELAVIL) 25 MG tablet    Sig: Take 1 tablet (25 mg) by mouth At Bedtime    Dispensed: 30  Refills: 2  Last prescribed on 3/7/23    Last clinic appointment: 5/3/23  Next clinic appointment: Not scheduled.     Preferred pharmacy:    [facility] - [place], [place] - [address]    Medication pended, Waiting for pt to confirm dose.     Angela Davila LPN

## 2023-06-26 ENCOUNTER — ANESTHESIA EVENT (OUTPATIENT)
Dept: SURGERY | Facility: CLINIC | Age: 43
End: 2023-06-26
Payer: COMMERCIAL

## 2023-06-26 ENCOUNTER — HOSPITAL ENCOUNTER (OUTPATIENT)
Facility: CLINIC | Age: 43
Discharge: HOME | End: 2023-06-26
Attending: INTERNAL MEDICINE | Admitting: INTERNAL MEDICINE
Payer: COMMERCIAL

## 2023-06-26 ENCOUNTER — ANCILLARY PROCEDURE (OUTPATIENT)
Dept: CARDIOLOGY | Facility: CLINIC | Age: 43
End: 2023-06-26
Attending: INTERNAL MEDICINE
Payer: COMMERCIAL

## 2023-06-26 ENCOUNTER — ANESTHESIA (OUTPATIENT)
Dept: SURGERY | Facility: CLINIC | Age: 43
End: 2023-06-26
Payer: COMMERCIAL

## 2023-06-26 VITALS
BODY MASS INDEX: 23.67 KG/M2 | SYSTOLIC BLOOD PRESSURE: 133 MMHG | HEART RATE: 66 BPM | HEIGHT: 71 IN | RESPIRATION RATE: 14 BRPM | WEIGHT: 169.09 LBS | DIASTOLIC BLOOD PRESSURE: 93 MMHG | OXYGEN SATURATION: 99 % | TEMPERATURE: 97.3 F

## 2023-06-26 DIAGNOSIS — R00.1 BRADYCARDIA: Primary | ICD-10-CM

## 2023-06-26 DIAGNOSIS — R00.1 BRADYCARDIA: ICD-10-CM

## 2023-06-26 LAB
BICARBONATE BODY FLUID SOURCE: NORMAL
CO2 FLD-SCNC: 8 MMOL/L
CO2 FLD-SCNC: >50 MMOL/L
UPPER EUS: NORMAL

## 2023-06-26 PROCEDURE — 250N000011 HC RX IP 250 OP 636

## 2023-06-26 PROCEDURE — 93279 PRGRMG DEV EVAL PM/LDLS PM: CPT

## 2023-06-26 PROCEDURE — 360N000075 HC SURGERY LEVEL 2, PER MIN: Performed by: INTERNAL MEDICINE

## 2023-06-26 PROCEDURE — 82374 ASSAY BLOOD CARBON DIOXIDE: CPT | Mod: 91 | Performed by: INTERNAL MEDICINE

## 2023-06-26 PROCEDURE — 250N000009 HC RX 250: Performed by: ANESTHESIOLOGY

## 2023-06-26 PROCEDURE — 999N000141 HC STATISTIC PRE-PROCEDURE NURSING ASSESSMENT: Performed by: INTERNAL MEDICINE

## 2023-06-26 PROCEDURE — 272N000001 HC OR GENERAL SUPPLY STERILE: Performed by: INTERNAL MEDICINE

## 2023-06-26 PROCEDURE — 258N000003 HC RX IP 258 OP 636

## 2023-06-26 PROCEDURE — 250N000011 HC RX IP 250 OP 636: Mod: JZ | Performed by: INTERNAL MEDICINE

## 2023-06-26 PROCEDURE — 710N000012 HC RECOVERY PHASE 2, PER MINUTE: Performed by: INTERNAL MEDICINE

## 2023-06-26 PROCEDURE — 93279 PRGRMG DEV EVAL PM/LDLS PM: CPT | Mod: 26 | Performed by: INTERNAL MEDICINE

## 2023-06-26 PROCEDURE — 250N000009 HC RX 250

## 2023-06-26 PROCEDURE — 250N000025 HC SEVOFLURANE, PER MIN: Performed by: INTERNAL MEDICINE

## 2023-06-26 PROCEDURE — 250N000009 HC RX 250: Performed by: INTERNAL MEDICINE

## 2023-06-26 PROCEDURE — 250N000011 HC RX IP 250 OP 636: Mod: JZ | Performed by: ANESTHESIOLOGY

## 2023-06-26 PROCEDURE — 370N000017 HC ANESTHESIA TECHNICAL FEE, PER MIN: Performed by: INTERNAL MEDICINE

## 2023-06-26 PROCEDURE — 710N000010 HC RECOVERY PHASE 1, LEVEL 2, PER MIN: Performed by: INTERNAL MEDICINE

## 2023-06-26 RX ORDER — FENTANYL CITRATE 50 UG/ML
25 INJECTION, SOLUTION INTRAMUSCULAR; INTRAVENOUS
Status: DISCONTINUED | OUTPATIENT
Start: 2023-06-26 | End: 2023-06-26 | Stop reason: HOSPADM

## 2023-06-26 RX ORDER — HYDROMORPHONE HCL IN WATER/PF 6 MG/30 ML
0.2 PATIENT CONTROLLED ANALGESIA SYRINGE INTRAVENOUS EVERY 5 MIN PRN
Status: DISCONTINUED | OUTPATIENT
Start: 2023-06-26 | End: 2023-06-26 | Stop reason: HOSPADM

## 2023-06-26 RX ORDER — LIDOCAINE 40 MG/G
CREAM TOPICAL
Status: DISCONTINUED | OUTPATIENT
Start: 2023-06-26 | End: 2023-06-26 | Stop reason: HOSPADM

## 2023-06-26 RX ORDER — FENTANYL CITRATE 50 UG/ML
25 INJECTION, SOLUTION INTRAMUSCULAR; INTRAVENOUS EVERY 5 MIN PRN
Status: DISCONTINUED | OUTPATIENT
Start: 2023-06-26 | End: 2023-06-26 | Stop reason: HOSPADM

## 2023-06-26 RX ORDER — OXYCODONE HYDROCHLORIDE 5 MG/1
5 TABLET ORAL
Status: DISCONTINUED | OUTPATIENT
Start: 2023-06-26 | End: 2023-06-26 | Stop reason: HOSPADM

## 2023-06-26 RX ORDER — ONDANSETRON 4 MG/1
4 TABLET, ORALLY DISINTEGRATING ORAL EVERY 6 HOURS PRN
Status: DISCONTINUED | OUTPATIENT
Start: 2023-06-26 | End: 2023-06-26 | Stop reason: HOSPADM

## 2023-06-26 RX ORDER — ONDANSETRON 4 MG/1
4 TABLET, ORALLY DISINTEGRATING ORAL EVERY 30 MIN PRN
Status: DISCONTINUED | OUTPATIENT
Start: 2023-06-26 | End: 2023-06-26 | Stop reason: HOSPADM

## 2023-06-26 RX ORDER — ONDANSETRON 2 MG/ML
4 INJECTION INTRAMUSCULAR; INTRAVENOUS EVERY 6 HOURS PRN
Status: DISCONTINUED | OUTPATIENT
Start: 2023-06-26 | End: 2023-06-26 | Stop reason: HOSPADM

## 2023-06-26 RX ORDER — NALOXONE HYDROCHLORIDE 0.4 MG/ML
0.2 INJECTION, SOLUTION INTRAMUSCULAR; INTRAVENOUS; SUBCUTANEOUS
Status: DISCONTINUED | OUTPATIENT
Start: 2023-06-26 | End: 2023-06-26 | Stop reason: HOSPADM

## 2023-06-26 RX ORDER — PROPOFOL 10 MG/ML
INJECTION, EMULSION INTRAVENOUS PRN
Status: DISCONTINUED | OUTPATIENT
Start: 2023-06-26 | End: 2023-06-26

## 2023-06-26 RX ORDER — LIDOCAINE HYDROCHLORIDE 20 MG/ML
INJECTION, SOLUTION INFILTRATION; PERINEURAL PRN
Status: DISCONTINUED | OUTPATIENT
Start: 2023-06-26 | End: 2023-06-26

## 2023-06-26 RX ORDER — DEXAMETHASONE SODIUM PHOSPHATE 4 MG/ML
INJECTION, SOLUTION INTRA-ARTICULAR; INTRALESIONAL; INTRAMUSCULAR; INTRAVENOUS; SOFT TISSUE PRN
Status: DISCONTINUED | OUTPATIENT
Start: 2023-06-26 | End: 2023-06-26

## 2023-06-26 RX ORDER — ONDANSETRON 2 MG/ML
4 INJECTION INTRAMUSCULAR; INTRAVENOUS EVERY 30 MIN PRN
Status: DISCONTINUED | OUTPATIENT
Start: 2023-06-26 | End: 2023-06-26 | Stop reason: HOSPADM

## 2023-06-26 RX ORDER — ONDANSETRON 2 MG/ML
INJECTION INTRAMUSCULAR; INTRAVENOUS PRN
Status: DISCONTINUED | OUTPATIENT
Start: 2023-06-26 | End: 2023-06-26

## 2023-06-26 RX ORDER — SODIUM CHLORIDE, SODIUM LACTATE, POTASSIUM CHLORIDE, CALCIUM CHLORIDE 600; 310; 30; 20 MG/100ML; MG/100ML; MG/100ML; MG/100ML
INJECTION, SOLUTION INTRAVENOUS CONTINUOUS
Status: DISCONTINUED | OUTPATIENT
Start: 2023-06-26 | End: 2023-06-26 | Stop reason: HOSPADM

## 2023-06-26 RX ORDER — NALOXONE HYDROCHLORIDE 0.4 MG/ML
0.4 INJECTION, SOLUTION INTRAMUSCULAR; INTRAVENOUS; SUBCUTANEOUS
Status: DISCONTINUED | OUTPATIENT
Start: 2023-06-26 | End: 2023-06-26 | Stop reason: HOSPADM

## 2023-06-26 RX ORDER — HYDROMORPHONE HCL IN WATER/PF 6 MG/30 ML
0.4 PATIENT CONTROLLED ANALGESIA SYRINGE INTRAVENOUS EVERY 5 MIN PRN
Status: DISCONTINUED | OUTPATIENT
Start: 2023-06-26 | End: 2023-06-26 | Stop reason: HOSPADM

## 2023-06-26 RX ORDER — OXYCODONE HYDROCHLORIDE 10 MG/1
10 TABLET ORAL
Status: DISCONTINUED | OUTPATIENT
Start: 2023-06-26 | End: 2023-06-26 | Stop reason: HOSPADM

## 2023-06-26 RX ORDER — SODIUM CHLORIDE, SODIUM LACTATE, POTASSIUM CHLORIDE, CALCIUM CHLORIDE 600; 310; 30; 20 MG/100ML; MG/100ML; MG/100ML; MG/100ML
INJECTION, SOLUTION INTRAVENOUS CONTINUOUS PRN
Status: DISCONTINUED | OUTPATIENT
Start: 2023-06-26 | End: 2023-06-26

## 2023-06-26 RX ORDER — FENTANYL CITRATE 50 UG/ML
INJECTION, SOLUTION INTRAMUSCULAR; INTRAVENOUS PRN
Status: DISCONTINUED | OUTPATIENT
Start: 2023-06-26 | End: 2023-06-26

## 2023-06-26 RX ORDER — FENTANYL CITRATE 50 UG/ML
50 INJECTION, SOLUTION INTRAMUSCULAR; INTRAVENOUS EVERY 5 MIN PRN
Status: DISCONTINUED | OUTPATIENT
Start: 2023-06-26 | End: 2023-06-26 | Stop reason: HOSPADM

## 2023-06-26 RX ORDER — FLUMAZENIL 0.1 MG/ML
0.2 INJECTION, SOLUTION INTRAVENOUS
Status: DISCONTINUED | OUTPATIENT
Start: 2023-06-26 | End: 2023-06-26 | Stop reason: HOSPADM

## 2023-06-26 RX ADMIN — HYDROMORPHONE HYDROCHLORIDE 0.2 MG: 0.2 INJECTION, SOLUTION INTRAMUSCULAR; INTRAVENOUS; SUBCUTANEOUS at 17:45

## 2023-06-26 RX ADMIN — SUGAMMADEX 200 MG: 100 INJECTION, SOLUTION INTRAVENOUS at 16:48

## 2023-06-26 RX ADMIN — Medication 50 MG: at 15:28

## 2023-06-26 RX ADMIN — ONDANSETRON 4 MG: 2 INJECTION INTRAMUSCULAR; INTRAVENOUS at 15:41

## 2023-06-26 RX ADMIN — FENTANYL CITRATE 25 MCG: 50 INJECTION, SOLUTION INTRAMUSCULAR; INTRAVENOUS at 17:39

## 2023-06-26 RX ADMIN — SODIUM CHLORIDE, POTASSIUM CHLORIDE, SODIUM LACTATE AND CALCIUM CHLORIDE: 600; 310; 30; 20 INJECTION, SOLUTION INTRAVENOUS at 15:24

## 2023-06-26 RX ADMIN — DEXAMETHASONE SODIUM PHOSPHATE 4 MG: 4 INJECTION, SOLUTION INTRA-ARTICULAR; INTRALESIONAL; INTRAMUSCULAR; INTRAVENOUS; SOFT TISSUE at 15:28

## 2023-06-26 RX ADMIN — FENTANYL CITRATE 50 MCG: 50 INJECTION, SOLUTION INTRAMUSCULAR; INTRAVENOUS at 17:20

## 2023-06-26 RX ADMIN — HUMAN SECRETIN 15.34 MCG: 16 INJECTION, POWDER, LYOPHILIZED, FOR SOLUTION INTRAVENOUS at 15:53

## 2023-06-26 RX ADMIN — FENTANYL CITRATE 25 MCG: 50 INJECTION, SOLUTION INTRAMUSCULAR; INTRAVENOUS at 17:30

## 2023-06-26 RX ADMIN — PROPOFOL 150 MG: 10 INJECTION, EMULSION INTRAVENOUS at 15:28

## 2023-06-26 RX ADMIN — LIDOCAINE HYDROCHLORIDE 100 MG: 20 INJECTION, SOLUTION INFILTRATION; PERINEURAL at 15:27

## 2023-06-26 RX ADMIN — FENTANYL CITRATE 100 MCG: 50 INJECTION, SOLUTION INTRAMUSCULAR; INTRAVENOUS at 15:21

## 2023-06-26 RX ADMIN — MIDAZOLAM 2 MG: 1 INJECTION INTRAMUSCULAR; INTRAVENOUS at 15:21

## 2023-06-26 ASSESSMENT — ACTIVITIES OF DAILY LIVING (ADL)
ADLS_ACUITY_SCORE: 35
ADLS_ACUITY_SCORE: 35

## 2023-06-26 NOTE — ANESTHESIA POSTPROCEDURE EVALUATION
Patient: Alexandra Nunez    Procedure: Procedure(s):  ENDOSCOPIC ULTRASOUND, ESOPHAGOSCOPY / UPPER GASTROINTESTINAL TRACT (GI)  ESOPHAGOGASTRODUODENOSCOPY, WITH CHOLECYSTOKININ-STIMULATED PANCREATIC FUNCTION TEST       Anesthesia Type:  General    Note:  Disposition: Outpatient   Postop Pain Control: Uneventful            Sign Out: Well controlled pain   PONV: No   Neuro/Psych: Uneventful            Sign Out: Acceptable/Baseline neuro status   Airway/Respiratory: Uneventful            Sign Out: Acceptable/Baseline resp. status   CV/Hemodynamics: Uneventful            Sign Out: Acceptable CV status; No obvious hypovolemia; No obvious fluid overload   Other NRE: NONE   DID A NON-ROUTINE EVENT OCCUR? No           Last vitals:  Vitals Value Taken Time   /83 06/26/23 1730   Temp     Pulse 60 06/26/23 1742   Resp 11 06/26/23 1730   SpO2 99 % 06/26/23 1742   Vitals shown include unvalidated device data.    Electronically Signed By: Samina Dewey MD  June 26, 2023  5:43 PM

## 2023-06-26 NOTE — ANESTHESIA CARE TRANSFER NOTE
Patient: Alexandra Nunez    Procedure: Procedure(s):  ENDOSCOPIC ULTRASOUND, ESOPHAGOSCOPY / UPPER GASTROINTESTINAL TRACT (GI)  ESOPHAGOGASTRODUODENOSCOPY, WITH CHOLECYSTOKININ-STIMULATED PANCREATIC FUNCTION TEST       Diagnosis: Chronic abdominal pain [R10.9, G89.29]  Diagnosis Additional Information: No value filed.    Anesthesia Type:   General     Note:    Oropharynx: oropharynx clear of all foreign objects and spontaneously breathing  Level of Consciousness: awake  Oxygen Supplementation: face mask  Level of Supplemental Oxygen (L/min / FiO2): 4  Independent Airway: airway patency satisfactory and stable  Dentition: dentition unchanged  Vital Signs Stable: post-procedure vital signs reviewed and stable  Report to RN Given: handoff report given  Patient transferred to: PACU    Handoff Report: Identifed the Patient, Identified the Reponsible Provider, Reviewed the pertinent medical history, Discussed the surgical course, Reviewed Intra-OP anesthesia mangement and issues during anesthesia, Set expectations for post-procedure period and Allowed opportunity for questions and acknowledgement of understanding      Vitals:  Vitals Value Taken Time   /72    Temp     Pulse 93 06/26/23 1700   Resp 14    SpO2 100 % 06/26/23 1700   Vitals shown include unvalidated device data.    Electronically Signed By: SOPHIA Lua CRNA  June 26, 2023  5:01 PM

## 2023-06-26 NOTE — ANESTHESIA PROCEDURE NOTES
Airway       Patient location during procedure: OR       Procedure Start/Stop Times: 6/26/2023 3:30 PM  Staff -        CRNA: Nj Olvera APRN CRNA       Performed By: CRNA  Consent for Airway        Urgency: elective  Indications and Patient Condition       Indications for airway management: enrrique-procedural       Induction type:intravenous       Mask difficulty assessment: 1 - vent by mask    Final Airway Details       Final airway type: endotracheal airway       Successful airway: ETT - single  Endotracheal Airway Details        ETT size (mm): 7.0       Cuffed: yes       Successful intubation technique: direct laryngoscopy       DL Blade Type: Louie 2       Grade View of Cords: 1       Adjucts: stylet       Position: Right       Measured from: gums/teeth       Secured at (cm): 21       Bite block used: None    Post intubation assessment        Placement verified by: capnometry, equal breath sounds and chest rise        Number of attempts at approach: 1       Number of other approaches attempted: 0       Secured with: pink tape       Ease of procedure: easy       Dentition: Intact and Unchanged       Dental guard used and removed.    Medication(s) Administered   Medication Administration Time: 6/26/2023 3:30 PM

## 2023-06-26 NOTE — ANESTHESIA PREPROCEDURE EVALUATION
Anesthesia Pre-Procedure Evaluation    Patient: Alexandra Nunez   MRN: [id number] : 1980        Procedure : Procedure(s):  ENDOSCOPIC ULTRASOUND, ESOPHAGOSCOPY / UPPER GASTROINTESTINAL TRACT (GI)  ESOPHAGOGASTRODUODENOSCOPY, WITH CHOLECYSTOKININ-STIMULATED PANCREATIC FUNCTION TEST          Past Medical History:   Diagnosis Date     Pacemaker      Uncomplicated asthma       Past Surgical History:   Procedure Laterality Date     GI SURGERY       INJECT NERVE BLOCK CELIAC PLEXUS Bilateral 2022    Procedure: Celiac Plexus Block;  Surgeon: Carla Boyd MD;  Location: [facility] OR     IR SINOGRAM INJECTION DIAGNOSTIC  2021     IR SINOGRAM INJECTION DIAGNOSTIC  2021     IR SINOGRAM INJECTION DIAGNOSTIC  2021     IR SINOGRAM INJECTION DIAGNOSTIC  2021     IR SINOGRAM INJECTION DIAGNOSTIC  2021     IR SINOGRAM INJECTION DIAGNOSTIC  2021     IR SINOGRAM INJECTION THERAPEUTIC  2021     IR SINOGRAM INJECTION THERAPEUTIC  2021      Allergies   Allergen Reactions     Nkda [No Known Drug Allergy]      Seasonal Allergies      Wheat Bran       Social History     Tobacco Use     Smoking status: Never     Smokeless tobacco: Never   Substance Use Topics     Alcohol use: Not Currently      Wt Readings from Last 1 Encounters:   22 77.1 kg (170 lb)        Anesthesia Evaluation   Pt has had prior anesthetic. Type: General.        ROS/MED HX  ENT/Pulmonary:     (+) Intermittent, asthma     Neurologic:  - neg neurologic ROS     Cardiovascular:     (+) -----pacemaker, Reason placed: Second degree heart block Mobitz I.     METS/Exercise Tolerance:     Hematologic:  - neg hematologic  ROS     Musculoskeletal:  - neg musculoskeletal ROS     GI/Hepatic: Comment: Dysphagia      Renal/Genitourinary:  - neg Renal ROS     Endo:  - neg endo ROS     Psychiatric/Substance Use:  - neg psychiatric ROS     Infectious Disease:  - neg infectious disease ROS     Malignancy:  - neg malignancy  ROS     Other:            Physical Exam    Airway        Mallampati: II   TM distance: > 3 FB   Neck ROM: full   Mouth opening: > 3 cm    Respiratory Devices and Support         Dental       (+) Minor Abnormalities - some fillings, tiny chips      Cardiovascular   cardiovascular exam normal          Pulmonary   pulmonary exam normal                OUTSIDE LABS:  CBC:   Lab Results   Component Value Date    WBC 5.2 12/16/2021    WBC 6.1 06/23/2021    HGB 12.0 12/16/2021    HGB 8.3 (L) 06/23/2021    HCT 35.6 12/16/2021    HCT 28.1 (L) 06/23/2021     12/16/2021     (H) 06/23/2021     BMP:   Lab Results   Component Value Date     12/16/2021     06/23/2021    POTASSIUM 3.8 12/16/2021    POTASSIUM 4.7 06/23/2021    CHLORIDE 108 12/16/2021    CHLORIDE 99 06/23/2021    CO2 24 12/16/2021    CO2 28 06/23/2021    BUN 8 12/16/2021    BUN 17 06/23/2021    CR 0.49 (L) 12/16/2021    CR 0.61 06/23/2021     (H) 12/16/2021     (H) 06/23/2021     COAGS:   Lab Results   Component Value Date    PTT 49 (H) 06/14/2021    INR 1.04 07/22/2021     POC:   Lab Results   Component Value Date     (H) 06/21/2021    HCG Negative 03/18/2022     HEPATIC:   Lab Results   Component Value Date    ALBUMIN 4.1 12/16/2021    PROTTOTAL 7.3 12/16/2021    ALT 18 12/16/2021    AST 12 12/16/2021    ALKPHOS 52 12/16/2021    BILITOTAL 1.3 12/16/2021     OTHER:   Lab Results   Component Value Date    MARTIN 8.9 12/16/2021    PHOS 4.3 06/14/2021    MAG 2.3 06/23/2021    LIPASE 125 12/16/2021    AMYLASE 45 12/16/2021    CRP 26.0 (H) 06/23/2021       Anesthesia Plan    ASA Status:  2      Anesthesia Type: General.     - Airway: ETT   Induction: Intravenous.   Maintenance: Balanced.        Consents    Anesthesia Plan(s) and associated risks, benefits, and realistic alternatives discussed. Questions answered and patient/representative(s) expressed understanding.    - Discussed:     - Discussed with:  Patient      - Extended  Intubation/Ventilatory Support Discussed: Yes.      - Patient is DNR/DNI Status: No    Use of blood products discussed: No .     Postoperative Care    Pain management: Multi-modal analgesia.   PONV prophylaxis: Ondansetron (or other 5HT-3)     Comments:              PAC Discussion and Assessment    ASA Classification: 2    Anesthetic techniques and relevant risks discussed: GA  Invasive monitoring and risk discussed: No    Possibility and Risk of blood transfusion discussed: No  NPO instructions given: NPO after midnight    Needs early admission to pre-op area: No                                             Bea Rand MD

## 2023-06-26 NOTE — DISCHARGE INSTRUCTIONS
[facility], [place]  Same-Day EGD Procedure  Adult Discharge Orders & Instructions     You had a procedure known as an Esophagogastroduodenoscopy (EGD) of either the upper gastrointestinal (GI) tract. An UPPER EGD will place a camera into either your mouth or nose to examine your esophagus, stomach, and/or small intestines. Biopsies, small samples of tissue, are often taken to help diagnose and/or classify stages of disease growth. The EGD is also used to help locate areas of the GI tract that may require further treatment (dilation, stenting, clipping, removal, etc.) or medical interventions (medication specific).      After your procedure   Make sure to clarify with your healthcare provider any diet restrictions (For example, clear liquid, low fat, no caffeine, etc.)   Do NOT take aspirin containing medications or any other blood-thinning medicines (anticoagulants) until your healthcare provider says it's OK.   You MAY be prescribed antibiotics, depending on what was done and/or found during your EUS, make sure to take antibiotics as prescribed by your healthcare provider    For 24 hours after surgery  Get plenty of rest.  A responsible adult must stay with you for at least 24 hours after you leave the hospital.   Do not drive or use heavy equipment.  If you have weakness or tingling, don't drive or use heavy equipment until this feeling goes away.  Do not drink alcohol.  Avoid strenuous or risky activities (gym, yoga, cycling, etc.).  Ask for help when climbing stairs.   You may feel lightheaded.  IF so, sit for a few minutes before standing.  Have someone help you get up.   If you have nausea (feel sick to your stomach): Drink only clear liquids such as apple juice, ginger ale, broth or 7-Up.  Rest may also help.  Be sure to drink enough fluids.  Move to a regular diet as you feel able.  If you feel bloated or have too much gas, use a heating pad on your belly to help reduce the  discomfort. This should help you feel better.   You may have a slight fever. This is normal for the first 24 hours.   You may have a dry mouth, a sore throat, muscle aches or trouble sleeping.  These are normal and will go away after 24 hours. A sore throat is most common. Use lozenges or gargle with salt water to ease the discomfort.   Do not make important or legal decisions.      Call your doctor for any of the following:  Chest pain, and/or shortness of breath  Abdominal  pain, bloating or cramping that has not improved or does not respond to pain reliving medications (Tylenol or narcotics if prescribed)   Difficulty swallowing or feeling as though food or liquids are stuck in your throat   Sore throat lasting more than 2 days or pain that has worsened over time   Black or tarry stools   Nausea and/or vomiting that is not resolving or has not responded to anti-nausea medications prescribed to you   It has been over 8 to 10 hours since surgery and you are still not able to urinate (pass water)   Headache for over 24 hours   Fever over 100.5 F (38 C) lasting more than 24 hours after the procedure   Signs of jaundice or blockage (fever, chills, abdominal pain, yellowing of the whites of your eyes, yellowing of your skin, and/or passing darker than normal urine)     To contact a doctor, call:   [ ] __Dr. Guru Acharya @ [phone] (GI Clinic) or [phone] _________ (Monday thru Friday 8:00am to 4:30pm)   [ ] [phone] and ask for the ___Gastroenterology_________ resident on call (answered 24 hours a day)   [ ] Emergency Department: [facility]: [phone]   Take it easy when you get home.  Remember, same day surgery DOES NOT MEAN SAME DAY RECOVERY!  Healing is a gradual process.  You will need some time to recover - you may be more tired than you realize at first.  Rest and relax for at least the first 24 hours at home.  You'll feel better and heal faster if you take good care of yourself.       [facility], [place]  Same-Day Surgery   Adult Discharge Orders & Instructions     For 24 hours after surgery    Get plenty of rest.  A responsible adult must stay with you for at least 24 hours after you leave the hospital.   Do not drive or use heavy equipment.  If you have weakness or tingling, don't drive or use heavy equipment until this feeling goes away.  Do not drink alcohol.  Avoid strenuous or risky activities.  Ask for help when climbing stairs.   You may feel lightheaded.  IF so, sit for a few minutes before standing.  Have someone help you get up.   If you have nausea (feel sick to your stomach): Drink only clear liquids such as apple juice, ginger ale, broth or 7-Up.  Rest may also help.  Be sure to drink enough fluids.  Move to a regular diet as you feel able.  You may have a slight fever. Call the doctor if your fever is over 100 F (37.7 C) (taken under the tongue) or lasts longer than 24 hours.  You may have a dry mouth, a sore throat, muscle aches or trouble sleeping.  These should go away after 24 hours.  Do not make important or legal decisions.   Call your doctor for any of the followin.  Signs of infection (fever, growing tenderness at the surgery site, a large amount of drainage or bleeding, severe pain, foul-smelling drainage, redness, swelling).    2. It has been over 8 to 10 hours since surgery and you are still not able to urinate (pass water).    3.  Headache for over 24 hours.    4.  Numbness, tingling or weakness the day after surgery (if you had spinal anesthesia).

## 2023-06-27 ENCOUNTER — PATIENT OUTREACH (OUTPATIENT)
Dept: GASTROENTEROLOGY | Facility: CLINIC | Age: 43
End: 2023-06-27
Payer: COMMERCIAL

## 2023-06-27 NOTE — TELEPHONE ENCOUNTER
"Alexandra had EUS with Dr. Acharya yesterday, send message about abdominal pain and follow up    Pt reporting nausea, hasn't been drinking a lot, \"my stomach feels painful\", \"feels like I\"m kind of full\", hasn't had BM since procedure. Pt having more pain than before procedure. Is taking muscle relaxer's and they aren't helping.    No concerning symptoms at this time. Encouraged patient to increase fluid intake to stay hydrated, flush out anesthesia and get her GI tract working. She states she'll drink more. I'll call tomorrow to check in. Agreed on clinic follow up with Dr. Aguiar 8/14/23 @ 2:30 via video visit    ML   "

## 2023-06-28 ENCOUNTER — NURSE TRIAGE (OUTPATIENT)
Dept: CALL CENTER | Age: 43
End: 2023-06-28
Payer: COMMERCIAL

## 2023-06-28 NOTE — TELEPHONE ENCOUNTER
Pt calling w/ update  Continues w/ abd pain- no change. Did increase fluids and walking per 6-27-23 Clinic recommendation;    High priority note to clinic ( GI, Pancreas/bili) for follow up/recommedation    Pt # [phone]  Pharm: [facility] - [place], [place] - [address]          6-26-23 Procedure   ENDOSCOPIC ULTRASOUND, ESOPHAGOSCOPY / UPPER GASTROINTESTINAL TRACT (GI)   ESOPHAGOGASTRODUODENOSCOPY, WITH CHOLECYSTOKININ-STIMULATED PANCREATIC FUNCTION TEST  Guru Filemon Acharya MD Primary            Reason for Disposition    Caller has URGENT question and triager unable to answer question    Additional Information    Negative: Chest pain    Negative: Difficulty breathing    Negative: Acting confused (e.g., disoriented, slurred speech) or excessively sleepy    Negative: Surgical incision symptoms and questions    Negative: Pain or burning with passing urine (urination) and male    Negative: Pain or burning with passing urine (urination) and female    Negative: Constipation    Negative: New or worsening leg (calf, thigh) pain    Negative: New or worsening leg swelling    Negative: Dizziness is severe, or persists > 24 hours after surgery    Negative: Symptoms arising from use of a urinary catheter (Henriquez or Coude)    Negative: Cast problems or questions    Negative: Medication question    Negative: Sounds like a life-threatening emergency to the triager    Negative: SEVERE headache and after spinal (epidural) anesthesia    Negative: Vomiting and persists > 4 hours    Negative: Vomiting and abdomen looks much more swollen than usual    Negative: Drinking very little and dehydration suspected (e.g., no urine > 12 hours, very dry mouth, very lightheaded)    Negative: Patient sounds very sick or weak to the triager    Negative: Sounds like a serious complication to the triager    Negative: Bright red, wide-spread, sunburn-like rash    Negative: Fever > 100.4 F (38.0 C)    Answer Assessment  "- Initial Assessment Questions  1. SYMPTOM: \"What's the main symptom you're concerned about?\" (e.g., pain, fever, vomiting)      Pain: stomach area, right and higher of belly button  Pain has increased since procedure  Constant  Pain scale, #7  Did speak w/ nurse yesterday - was told to walk and drink fluid- this has not helped  2. ONSET: \"When did   start?\"      Since proedure  3. SURGERY: \"What surgery did you have?\"      6-26-23  4. DATE of SURGERY: \"When was the surgery?\"         5. ANESTHESIA: \" What type of anesthesia did you have?\" (e.g., general, spinal, epidural, local)        6. PAIN: \"Is there any pain?\" If Yes, ask: \"How bad is it?\"  (Scale 1-10; or mild, moderate, severe)      Mod- #7  7. FEVER: \"Do you have a fever?\" If Yes, ask: \"What is your temperature, how was it measured, and when did it start?\"     no  8. VOMITING: \"Is there any vomiting?\" If Yes, ask: \"How many times?\"      Did vomit on Monday night, none since then    9. BLEEDING: \"Is there any bleeding?\" If Yes, ask: \"How much?\" and \"Where?\"     Denies coughing, bleeding  10. OTHER SYMPTOMS: \"Do you have any other symptoms?\" (e.g., drainage from wound, painful urination, constipation)  Did take twice yesterday:   tylenol- 2 tabs ( 500 mg) did help lessen the pain, but pain still there    Has not had a BM since Monday, not usual for her- does not have a BM everyday  Is passing gas  Oral food: solids ,Intake not her normal ( not feeling the best), does get fluids in -    Protocols used: POST-OP SYMPTOMS AND [id number]      "

## 2023-06-28 NOTE — PROGRESS NOTES
"Dr. Acharya updated regarding patient ongoing abdominal pain. Per Dr. Acharya  \"All I did was a diagnostic EGD and EUS to exclude chronic pancreatitis. Maybe unrelated to procedure. Could be constipated. If pain persists until this Friday, 6/30, can then order CT abdomen,\"    Called to discuss with patient:    Per patient, \"still having pain in her stomach area, where I normally have it but its heightened now\" No Bm yet, pt reports stooling every few days. Recommended Miralax. Patient reports tylenol is helping, taking 2-4 500mg tylenol per day. Advised she can go up to 4,000mg a day, taking 2 every 6 hours until symptoms improved. Asked that she keep in touch and discussed potential imaging if symptoms persist.    ML  " 09-Apr-2020

## 2023-06-29 LAB
MDC_IDC_LEAD_IMPLANT_DT: NORMAL
MDC_IDC_LEAD_IMPLANT_DT: NORMAL
MDC_IDC_LEAD_LOCATION: NORMAL
MDC_IDC_LEAD_LOCATION: NORMAL
MDC_IDC_LEAD_LOCATION_DETAIL_1: NORMAL
MDC_IDC_LEAD_LOCATION_DETAIL_1: NORMAL
MDC_IDC_LEAD_MFG: NORMAL
MDC_IDC_LEAD_MFG: NORMAL
MDC_IDC_LEAD_MODEL: NORMAL
MDC_IDC_LEAD_MODEL: NORMAL
MDC_IDC_LEAD_POLARITY_TYPE: NORMAL
MDC_IDC_LEAD_POLARITY_TYPE: NORMAL
MDC_IDC_LEAD_SERIAL: NORMAL
MDC_IDC_LEAD_SERIAL: NORMAL
MDC_IDC_LEAD_SPECIAL_FUNCTION: NORMAL
MDC_IDC_MSMT_BATTERY_DTM: NORMAL
MDC_IDC_MSMT_BATTERY_IMPEDANCE: 905 OHM
MDC_IDC_MSMT_BATTERY_REMAINING_LONGEVITY: 83 MO
MDC_IDC_MSMT_BATTERY_STATUS: NORMAL
MDC_IDC_MSMT_BATTERY_VOLTAGE: 2.78 V
MDC_IDC_MSMT_LEADCHNL_RV_IMPEDANCE_VALUE: 451 OHM
MDC_IDC_MSMT_LEADCHNL_RV_PACING_THRESHOLD_AMPLITUDE: 1.25 V
MDC_IDC_MSMT_LEADCHNL_RV_PACING_THRESHOLD_PULSEWIDTH: 0.4 MS
MDC_IDC_MSMT_LEADCHNL_RV_SENSING_INTR_AMPL: 8 MV
MDC_IDC_PG_IMPLANT_DTM: NORMAL
MDC_IDC_PG_MFG: NORMAL
MDC_IDC_PG_MODEL: NORMAL
MDC_IDC_PG_SERIAL: NORMAL
MDC_IDC_PG_TYPE: NORMAL
MDC_IDC_SESS_CLINIC_NAME: NORMAL
MDC_IDC_SESS_DTM: NORMAL
MDC_IDC_SESS_TYPE: NORMAL
MDC_IDC_SET_BRADY_HYSTRATE: NORMAL
MDC_IDC_SET_BRADY_LOWRATE: 40 {BEATS}/MIN
MDC_IDC_SET_BRADY_MAX_TRACKING_RATE: 110 {BEATS}/MIN
MDC_IDC_SET_BRADY_MODE: NORMAL
MDC_IDC_SET_LEADCHNL_RV_PACING_AMPLITUDE: 2.5 V
MDC_IDC_SET_LEADCHNL_RV_PACING_CAPTURE_MODE: NORMAL
MDC_IDC_SET_LEADCHNL_RV_PACING_POLARITY: NORMAL
MDC_IDC_SET_LEADCHNL_RV_PACING_PULSEWIDTH: 0.4 MS
MDC_IDC_SET_LEADCHNL_RV_SENSING_POLARITY: NORMAL
MDC_IDC_SET_LEADCHNL_RV_SENSING_SENSITIVITY: 2.8 MV
MDC_IDC_SET_ZONE_DETECTION_INTERVAL: 333.33 MS
MDC_IDC_SET_ZONE_TYPE: NORMAL
MDC_IDC_SET_ZONE_TYPE: NORMAL
MDC_IDC_STAT_AT_BURDEN_PERCENT: 0 %
MDC_IDC_STAT_AT_DTM_END: NORMAL
MDC_IDC_STAT_AT_DTM_START: NORMAL
MDC_IDC_STAT_BRADY_DTM_END: NORMAL
MDC_IDC_STAT_BRADY_DTM_START: NORMAL
MDC_IDC_STAT_BRADY_RV_PERCENT_PACED: 0 %
MDC_IDC_STAT_EPISODE_RECENT_COUNT: 0
MDC_IDC_STAT_EPISODE_RECENT_COUNT: 1
MDC_IDC_STAT_EPISODE_RECENT_COUNT_DTM_END: NORMAL
MDC_IDC_STAT_EPISODE_RECENT_COUNT_DTM_END: NORMAL
MDC_IDC_STAT_EPISODE_RECENT_COUNT_DTM_START: NORMAL
MDC_IDC_STAT_EPISODE_RECENT_COUNT_DTM_START: NORMAL
MDC_IDC_STAT_EPISODE_TYPE: NORMAL
MDC_IDC_STAT_EPISODE_TYPE: NORMAL

## 2023-06-30 ENCOUNTER — CARE COORDINATION (OUTPATIENT)
Dept: GASTROENTEROLOGY | Facility: CLINIC | Age: 43
End: 2023-06-30
Payer: COMMERCIAL

## 2023-06-30 NOTE — PROGRESS NOTES
Follow up: Post EUS/EGD on 6/26/23 with Dr. Acharya.      Post procedure recommendations:    - Observe patient in same day observation unit for ongoing care.   - Will recommend further follow up with Dr Aguiar to explore other causes for abdominal pain   - The findings and recommendations were discussed with the patient.     Patient has been in contact with clinic. Articulated an understanding of symptoms to report.     Orders placed: None indicated. Patient has follow up arranged with Dr. Aguiar.      Neyda Juares, RN Care Coordinator

## 2023-07-07 ENCOUNTER — MYC MEDICAL ADVICE (OUTPATIENT)
Dept: ANESTHESIOLOGY | Facility: CLINIC | Age: 43
End: 2023-07-07

## 2023-07-07 DIAGNOSIS — M79.18 MYOFASCIAL PAIN: ICD-10-CM

## 2023-07-07 RX ORDER — METHOCARBAMOL 750 MG/1
750 TABLET, FILM COATED ORAL 4 TIMES DAILY PRN
Qty: 90 TABLET | Refills: 1 | Status: SHIPPED | OUTPATIENT
Start: 2023-07-07 | End: 2023-09-14

## 2023-07-07 NOTE — TELEPHONE ENCOUNTER
Refill request    Medication: methocarbamol (ROBAXIN) 750 MG tablet    Sig: Take 1 tablet (750 mg) by mouth 4 times daily as needed for muscle spasms     Dispensed: 90  Refills: 1  Last prescribed on 5/3/23    Last clinic appointment: 5/3/23  Next clinic appointment: Not scheduled    Preferred pharmacy:    [facility] - [place] - [address]    Medication refilled. No Changes.     Angela Davila LPN

## 2023-08-08 ENCOUNTER — DOCUMENTATION ONLY (OUTPATIENT)
Dept: GASTROENTEROLOGY | Facility: CLINIC | Age: 43
End: 2023-08-08
Payer: COMMERCIAL

## 2023-08-08 NOTE — PROGRESS NOTES
Called PT and left VM.    Called to remind patient of their upcoming appointment with our GI clinic, on 08/14/23 at 2:30 PM with Dr. Johny Aguiar. This appointment is scheduled as a video visit. You will receive a call approximately 30 minutes prior to check you in, you must be in [place] for this visit., if your appointment is virtual (video or telephone) you need to be in [place] for the visit. To reschedule or cancel patient to call [phone].      SK

## 2023-08-10 NOTE — PROGRESS NOTES
Patient called and confirmed their upcoming appointment with our GI clinic, on 08/14/23 at 2:30 PM with Dr. Johny Aguiar. This appointment is scheduled as a video visit. You will receive a call approximately 30 minutes prior to check you in, you must be in [place] for this visit., if your appointment is virtual (video or telephone) you need to be in [place] for the visit. To reschedule or cancel patient to call [phone].        SK

## 2023-08-14 ENCOUNTER — VIRTUAL VISIT (OUTPATIENT)
Dept: GASTROENTEROLOGY | Facility: CLINIC | Age: 43
End: 2023-08-14
Payer: COMMERCIAL

## 2023-08-14 DIAGNOSIS — G89.29 CHRONIC ABDOMINAL PAIN: Primary | ICD-10-CM

## 2023-08-14 DIAGNOSIS — R10.9 CHRONIC ABDOMINAL PAIN: Primary | ICD-10-CM

## 2023-08-14 PROCEDURE — 99213 OFFICE O/P EST LOW 20 MIN: CPT | Mod: VID | Performed by: INTERNAL MEDICINE

## 2023-08-14 ASSESSMENT — PAIN SCALES - GENERAL: PAINLEVEL: SEVERE PAIN (6)

## 2023-08-14 NOTE — PROGRESS NOTES
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  Shreyas and I spent 15 minutes discussing that I think her next move should be to try a different approach pain clinic and I suggest that we try [facility] pain clinic which has produced excellent responses in some of our other patients.  She might be considered for spinal cord stimulator other approach as she has failed celiac plexus blocks    We spent 15 minutes total times documented  Plan is referral to the [facility] pain clinic

## 2023-08-14 NOTE — PROGRESS NOTES
Virtual Visit Details    Type of service:  Video Visit     Originating Location (pt. Location): Home    Distant Location (provider location):  Off-site  Platform used for Video Visit: Shaneka  Joined the call at 8/14/2023, 2:45:37 pm.  Left the call at 8/14/2023, 3:00:29 pm.  You were on the call for 14 minutes 52 seconds .

## 2023-08-14 NOTE — LETTER
8/14/2023         RE: Alexandra Nunez  [address] [place] [address]        Dear Colleague,    Thank you for referring your patient, Alexandra Nunez, to the [facility] [employer]. Please see a copy of my visit note below.    You were on the call for 14 minutes 52 seconds .    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  Shreyas and I spent 15 minutes discussing that I think her next move should be to try a different approach pain clinic and I suggest that we try [facility] pain clinic which has produced excellent responses in some of our other patients.  She might be considered for spinal cord stimulator other approach as she has failed celiac plexus blocks    We spent 15 minutes total times documented  Plan is referral to the [facility] pain clinic        Again, thank you for allowing me to participate in the care of your patient.      Sincerely,    Johny Aguiar MD

## 2023-08-14 NOTE — NURSING NOTE
Is the patient currently in the state of [place]? YES    Visit mode:VIDEO    If the visit is dropped, the patient can be reconnected by: VIDEO VISIT: Text to cell phone: [phone]    Will anyone else be joining the visit? NO      How would you like to obtain your AVS? MyChart    Are changes needed to the allergy or medication list? NO    Reason for visit: RECHECK

## 2023-08-15 ENCOUNTER — MYC MEDICAL ADVICE (OUTPATIENT)
Dept: ANESTHESIOLOGY | Facility: CLINIC | Age: 43
End: 2023-08-15
Payer: COMMERCIAL

## 2023-08-15 DIAGNOSIS — K86.1 CHRONIC PANCREATITIS, UNSPECIFIED PANCREATITIS TYPE (H): ICD-10-CM

## 2023-08-15 DIAGNOSIS — R10.9 CHRONIC ABDOMINAL PAIN: ICD-10-CM

## 2023-08-15 DIAGNOSIS — G89.29 CHRONIC ABDOMINAL PAIN: ICD-10-CM

## 2023-08-15 NOTE — PATIENT INSTRUCTIONS
Follow up:    Dr. Aguiar has outlined the following steps after your recent clinic visit:    Referral to [facility] Pain clinic, they will reach out to schedule.        Please call with any questions or concerns regarding your clinic visit today.    It is a pleasure being involved in your health care.    Contacts post-consultation depending on your need:    Schedule Clinic Appointments            [phone] # 1   M-F 7:30 - 5 pm    Jessica Damon RN Care Coordinator (Dr. Carlin/Dr. Aguiar)  [phone]    Neyda Juares, RN Care Coordinator (Dr. Acharya)   [phone]    Aundrea Kulkarni, RN Care Coordinator (Dr. Velazquez/Dr. Romero)  [phone]    [facility]  (all MDs)   [phone]      For urgent/emergent questions after business hours, you may reach the on-call GI Fellow by contacting the [facility]  at [phone].    How do I schedule labs, imaging studies, or procedures that were ordered in clinic today?     Labs: To schedule lab appointment at the [facility], use my chart or call [phone]. If you have a [facility] lab closer to home where you are regularly seen you can give them a call.     Procedures: If a colonoscopy, upper endoscopy, breath test, esophageal manometry, or pH impedence was ordered today, our endoscopy team will call you to schedule this. If you have not heard from our endoscopy team within a week, please call [phone] to schedule.     Imaging Studies: If you were scheduled for a CT scan, X-ray, MRI, ultrasound, HIDA scan or other imaging study, please call [phone] to have this scheduled.     Referral: If a referral to another specialty was ordered, expect a phone call or follow instructions above. If you have not heard from anyone regarding your referral in a week, please call our clinic to check the status.     How to I schedule a follow-up visit?  If you did not schedule a follow-up visit today,  please call [phone] to schedule a follow-up office visit.

## 2023-08-15 NOTE — TELEPHONE ENCOUNTER
Refill request    Medication: tiZANidine (ZANAFLEX) 2 MG tablet     Sig: Take 1 tablet (2 mg) by mouth 3 times daily as needed for muscle spasms     Dispensed: 90  Refills: 2  Last prescribed on 5/10/23    Last clinic appointment: 5/3/23  Next clinic appointment: Not scheduled.     Preferred pharmacy:    [facility] - [place], [place] - [address]       Refill request routed to the provider to review.     Angela Davila LPN

## 2023-08-17 RX ORDER — TIZANIDINE 2 MG/1
2 TABLET ORAL 3 TIMES DAILY PRN
Qty: 90 TABLET | Refills: 2 | Status: SHIPPED | OUTPATIENT
Start: 2023-08-17 | End: 2024-04-03

## 2023-08-26 ENCOUNTER — HEALTH MAINTENANCE LETTER (OUTPATIENT)
Age: 43
End: 2023-08-26

## 2023-09-14 ENCOUNTER — MYC MEDICAL ADVICE (OUTPATIENT)
Dept: ANESTHESIOLOGY | Facility: CLINIC | Age: 43
End: 2023-09-14
Payer: COMMERCIAL

## 2023-09-14 DIAGNOSIS — M79.18 MYOFASCIAL PAIN: ICD-10-CM

## 2023-09-14 NOTE — TELEPHONE ENCOUNTER
Refill request    Medication: methocarbamol (ROBAXIN) 750 MG tablet     Sig: Take 1 tablet (750 mg) by mouth 4 times daily as needed for muscle spasms - Oral     Dispensed: 90  Refills: 1  SOLD to the pt on: 8/12/2023    Last clinic appointment: 5/3/2023 (virtual)  Next clinic appointment: not scheduled     Last Drug Screen Collected: not on file  Controlled Substance Agreement signed: not on file      Preferred pharmacy:    [facility] - [place], [place] - [address]     Request routed to provider     Sunil Whipple, EMT

## 2023-09-18 RX ORDER — METHOCARBAMOL 750 MG/1
750 TABLET, FILM COATED ORAL 4 TIMES DAILY PRN
Qty: 90 TABLET | Refills: 1 | Status: SHIPPED | OUTPATIENT
Start: 2023-09-18 | End: 2023-11-02

## 2023-10-04 ENCOUNTER — MYC MEDICAL ADVICE (OUTPATIENT)
Dept: ANESTHESIOLOGY | Facility: CLINIC | Age: 43
End: 2023-10-04
Payer: COMMERCIAL

## 2023-10-04 DIAGNOSIS — K86.1 CHRONIC PANCREATITIS, UNSPECIFIED PANCREATITIS TYPE (H): ICD-10-CM

## 2023-10-05 NOTE — TELEPHONE ENCOUNTER
Refill request    Medication: amitriptyline (ELAVIL) 25 MG tablet     Sig: Take 1 tablet (25 mg) by mouth At Bedtime     Dispensed: 30  Refills: 2    Last prescribed to patient: 6/21/23     Last clinic appointment: 5/3/23  Next clinic appointment: Not scheduled. LPN responded to Pt's [employer] message requesting that they schedule a follow up appointment.       Preferred pharmacy:    [facility] [address] - [place], [place] - [address]           Refill request routed to the provider to review.     Angela Davila LPN

## 2023-11-02 DIAGNOSIS — M79.18 MYOFASCIAL PAIN: ICD-10-CM

## 2023-11-02 NOTE — TELEPHONE ENCOUNTER
Refill request Faxed to the clinic    Medication: methocarbamol (ROBAXIN) 750 MG tablet     Sig: Take 1 tablet (750 mg) by mouth 4 times daily as needed for muscle spasms     Dispensed: 90  Refills: 1    Last prescribed to patient: 9/18/23       Last clinic appointment: 5/3/23  Next clinic appointment: Not scheduled.     Preferred pharmacy:    [facility] [address] - [place] - [address]         Refill request routed to the provider to review.

## 2023-11-15 RX ORDER — METHOCARBAMOL 750 MG/1
750 TABLET, FILM COATED ORAL 4 TIMES DAILY PRN
Qty: 90 TABLET | Refills: 1 | Status: SHIPPED | OUTPATIENT
Start: 2023-11-15 | End: 2024-01-03

## 2024-01-02 DIAGNOSIS — K86.1 CHRONIC PANCREATITIS, UNSPECIFIED PANCREATITIS TYPE (H): ICD-10-CM

## 2024-01-03 DIAGNOSIS — M79.18 MYOFASCIAL PAIN: ICD-10-CM

## 2024-01-03 NOTE — TELEPHONE ENCOUNTER
Medication: methocarbamol (ROBAXIN) 750 MG tablet      Sig: Take 1 tablet (750 mg) by mouth 4 times daily as needed for muscle spasms      Dispensed: 90  Refills: 1     Last prescribed to patient: 12/15/23        Last clinic appointment: 5/3/23  Next clinic appointment: Not scheduled.      Preferred pharmacy:     [facility] [address] - [place] - [address]            Refill request routed to the provider to review.

## 2024-01-04 RX ORDER — METHOCARBAMOL 750 MG/1
750 TABLET, FILM COATED ORAL 4 TIMES DAILY PRN
Qty: 90 TABLET | Refills: 1 | Status: SHIPPED | OUTPATIENT
Start: 2024-01-04 | End: 2024-03-01

## 2024-03-01 DIAGNOSIS — M79.18 MYOFASCIAL PAIN: ICD-10-CM

## 2024-03-01 NOTE — TELEPHONE ENCOUNTER
Refill request faxed from pharmacy.         Medication: methocarbamol (ROBAXIN) 750 MG tablet     Sig: Take 1 tablet (750 mg) by mouth 4 times daily as needed for muscle spasms     Dispensed: 90  Refills: 1    Last prescribed to patient: 1/4/24     Last clinic appointment: 5/3/23  Next clinic appointment: Not scheduled.         Preferred pharmacy:    [facility] [place] [facility] [place] [address]       Refill request routed to the provider to review.     Angela Davila LPN

## 2024-03-08 RX ORDER — METHOCARBAMOL 750 MG/1
750 TABLET, FILM COATED ORAL 4 TIMES DAILY PRN
Qty: 90 TABLET | Refills: 1 | Status: SHIPPED | OUTPATIENT
Start: 2024-03-08 | End: 2024-06-18

## 2024-03-11 ENCOUNTER — TELEPHONE (OUTPATIENT)
Dept: ANESTHESIOLOGY | Facility: CLINIC | Age: 44
End: 2024-03-11
Payer: COMMERCIAL

## 2024-03-11 NOTE — TELEPHONE ENCOUNTER
Message sent to clinic coordinators asking that they contact the pt to schedule a follow up appointment with Dr. Boyd.     Angela Davila LPN

## 2024-04-03 ENCOUNTER — VIRTUAL VISIT (OUTPATIENT)
Dept: ANESTHESIOLOGY | Facility: CLINIC | Age: 44
End: 2024-04-03
Payer: COMMERCIAL

## 2024-04-03 DIAGNOSIS — R10.9 CHRONIC ABDOMINAL PAIN: ICD-10-CM

## 2024-04-03 DIAGNOSIS — K86.1 CHRONIC PANCREATITIS, UNSPECIFIED PANCREATITIS TYPE (H): ICD-10-CM

## 2024-04-03 DIAGNOSIS — G89.29 CHRONIC ABDOMINAL PAIN: ICD-10-CM

## 2024-04-03 PROCEDURE — 99213 OFFICE O/P EST LOW 20 MIN: CPT | Mod: 95 | Performed by: ANESTHESIOLOGY

## 2024-04-03 RX ORDER — TIZANIDINE 2 MG/1
2 TABLET ORAL 3 TIMES DAILY PRN
Qty: 90 TABLET | Refills: 2 | Status: SHIPPED | OUTPATIENT
Start: 2024-04-03 | End: 2024-07-25

## 2024-04-03 ASSESSMENT — PAIN SCALES - GENERAL: PAINLEVEL: MODERATE PAIN (5)

## 2024-04-03 NOTE — PROGRESS NOTES
[facility]    Date of visit: 04/03/24      Chief complaint:   Chief Complaint   Patient presents with    Follow Up     Follow-up Chronic Pancreatitis     Interval history:  Alexandra Nunez was last seen by me on 1/27/2023.      Recommendations/plan at the last visit included:  Physical Therapy:  Continue daily HEPs  Clinical Health Psychologist to address issues of relaxation, behavioral change, coping style, and other factors important to improvement.  Continue current therapy  Diagnostic Studies:  none  Medication Management:  Methocarbamol 750 mg QID PRN  Further procedures recommended: none  Recommendations to PCP: none  Follow up: 3 months    Since her last visit, Alexandra Nunez reports:  Has been feeling up and down   Had procedure with Dr Aguiar - didn't find anything;   Dr Aguiar suggested a holistic pain clinic - she will call to try this    Pain scores:  Pain intensity on average is 6 on a scale of 0-10.     Current pain treatments:   Amitriptyline 25 mg at bedtime   Tizanidine - doesn't take daily, but only once per day when takes it  Methocarbamol 750 mg QID - takes daily at night     Medications:  Current Outpatient Medications   Medication Sig Dispense Refill    albuterol (PROAIR HFA/PROVENTIL HFA/VENTOLIN HFA) 108 (90 Base) MCG/ACT inhaler Inhale 1-2 puffs into the lungs every 4 hours as needed for shortness of breath / dyspnea or wheezing      albuterol (PROVENTIL) (2.5 MG/3ML) 0.083% neb solution Take 2.5 mg by nebulization every 4 hours as needed for shortness of breath / dyspnea or wheezing      ALPRAZolam (XANAX) 0.5 MG tablet Take 0.5 mg by mouth 2 times daily as needed for anxiety      amitriptyline (ELAVIL) 25 MG tablet Take 1 tablet (25 mg) by mouth at bedtime 30 tablet 2    fluticasone (FLOVENT HFA) 110 MCG/ACT inhaler Inhale 2 puffs into the lungs 2 times daily      ibuprofen (ADVIL/MOTRIN) 100 MG/5ML suspension Take 600 mg by mouth every 6 hours as needed for fever  or moderate pain      levonorgestrel (MIRENA) 20 MCG/24HR IUD 1 each by Intrauterine route once      methocarbamol (ROBAXIN) 750 MG tablet Take 1 tablet (750 mg) by mouth 4 times daily as needed for muscle spasms 90 tablet 1    ondansetron (ZOFRAN) 4 MG tablet Take by mouth every 6 hours as needed for nausea      tiZANidine (ZANAFLEX) 2 MG tablet Take 1 tablet (2 mg) by mouth 3 times daily as needed for muscle spasms 90 tablet 2    traMADol (ULTRAM) 50 MG tablet Take 1 tablet (50 mg) by mouth 2 times daily as needed for severe pain 20 tablet 0    Vitamin D3 (CHOLECALCIFEROL) 25 mcg (1000 units) tablet Take 1 tablet by mouth daily         Medical History: any changes in medical history since they were last seen? No    Review of Systems:  The 14 system ROS was reviewed from the intake questionnaire, and is positive for: abdominal pain  Any bowel or bladder problems: stable  Mood: stable    Physical Exam: Virtual Visit  There were no vitals taken for this visit.  General: AAOx3, NAD    Assessment:   Chronic pancreatitis  Abdominal pain    Alexandra Nunez is a 42 year old female who is seen at the pain clinic for follow up for management of her chronic abdominal pain. She continues to have waxing and waning symptoms. We will continue her current regimen. Refills provided as needed    Plan:  Physical Therapy:  Continue daily HEPs  Clinical Health Psychologist to address issues of relaxation, behavioral change, coping style, and other factors important to improvement.  Continue current therapy  Diagnostic Studies:  none  Medication Management:  Amitriptyline, tizanidine refills provided, no need for robaxin refill today  Further procedures recommended: none  Recommendations to PCP: none  Follow up: 3 months    Carla Boyd MD    Pain Medicine  Department of Anesthesiology  [facility]

## 2024-04-03 NOTE — PROGRESS NOTES
Alexandra is a 43 year old who is being evaluated via a billable video visit.    How would you like to obtain your AVS? Outitudehart  If the video visit is dropped, the invitation should be resent by: Text to cell phone: [phone]  Will anyone else be joining your video visit? No

## 2024-04-03 NOTE — LETTER
4/3/2024       RE: Alexandra Nunez  [address] [place] [address]     Dear Colleague,    Thank you for referring your patient, Alexandra Nunez, to the [facility] FOR [facility]IVE [facility] MANAGEMENT [place] at [facility]. Please see a copy of my visit note below.    [facility]    Date of visit: 04/03/24      Chief complaint:   Chief Complaint   Patient presents with    Follow Up     Follow-up Chronic Pancreatitis     Interval history:  Alexandra Nunez was last seen by me on 1/27/2023.      Recommendations/plan at the last visit included:  Physical Therapy:  Continue daily HEPs  Clinical Health Psychologist to address issues of relaxation, behavioral change, coping style, and other factors important to improvement.  Continue current therapy  Diagnostic Studies:  none  Medication Management:  Methocarbamol 750 mg QID PRN  Further procedures recommended: none  Recommendations to PCP: none  Follow up: 3 months    Since her last visit, Alexandra Nunez reports:  Has been feeling up and down   Had procedure with Dr Aguiar - didn't find anything;   Dr Aguiar suggested a holistic pain clinic - she will call to try this    Pain scores:  Pain intensity on average is 6 on a scale of 0-10.     Current pain treatments:   Amitriptyline 25 mg at bedtime   Tizanidine - doesn't take daily, but only once per day when takes it  Methocarbamol 750 mg QID - takes daily at night     Medications:  Current Outpatient Medications   Medication Sig Dispense Refill    albuterol (PROAIR HFA/PROVENTIL HFA/VENTOLIN HFA) 108 (90 Base) MCG/ACT inhaler Inhale 1-2 puffs into the lungs every 4 hours as needed for shortness of breath / dyspnea or wheezing      albuterol (PROVENTIL) (2.5 MG/3ML) 0.083% neb solution Take 2.5 mg by nebulization every 4 hours as needed for shortness of breath / dyspnea or wheezing      ALPRAZolam (XANAX) 0.5 MG tablet Take 0.5 mg by mouth  2 times daily as needed for anxiety      amitriptyline (ELAVIL) 25 MG tablet Take 1 tablet (25 mg) by mouth at bedtime 30 tablet 2    fluticasone (FLOVENT HFA) 110 MCG/ACT inhaler Inhale 2 puffs into the lungs 2 times daily      ibuprofen (ADVIL/MOTRIN) 100 MG/5ML suspension Take 600 mg by mouth every 6 hours as needed for fever or moderate pain      levonorgestrel (MIRENA) 20 MCG/24HR IUD 1 each by Intrauterine route once      methocarbamol (ROBAXIN) 750 MG tablet Take 1 tablet (750 mg) by mouth 4 times daily as needed for muscle spasms 90 tablet 1    ondansetron (ZOFRAN) 4 MG tablet Take by mouth every 6 hours as needed for nausea      tiZANidine (ZANAFLEX) 2 MG tablet Take 1 tablet (2 mg) by mouth 3 times daily as needed for muscle spasms 90 tablet 2    traMADol (ULTRAM) 50 MG tablet Take 1 tablet (50 mg) by mouth 2 times daily as needed for severe pain 20 tablet 0    Vitamin D3 (CHOLECALCIFEROL) 25 mcg (1000 units) tablet Take 1 tablet by mouth daily         Medical History: any changes in medical history since they were last seen? No    Review of Systems:  The 14 system ROS was reviewed from the intake questionnaire, and is positive for: abdominal pain  Any bowel or bladder problems: stable  Mood: stable    Physical Exam: Virtual Visit  There were no vitals taken for this visit.  General: AAOx3, NAD    Assessment:   Chronic pancreatitis  Abdominal pain    Alexandra Nunez is a 42 year old female who is seen at the pain clinic for follow up for management of her chronic abdominal pain. She continues to have waxing and waning symptoms. We will continue her current regimen. Refills provided as needed    Plan:  Physical Therapy:  Continue daily HEPs  Clinical Health Psychologist to address issues of relaxation, behavioral change, coping style, and other factors important to improvement.  Continue current therapy  Diagnostic Studies:  none  Medication Management:  Amitriptyline, tizanidine refills provided, no need  for robaxin refill today  Further procedures recommended: none  Recommendations to PCP: none  Follow up: 3 months      Alexandra is a 43 year old who is being evaluated via a billable video visit.    How would you like to obtain your AVS? MyChart  If the video visit is dropped, the invitation should be resent by: Text to cell phone: [phone]  Will anyone else be joining your video visit? No           Again, thank you for allowing me to participate in the care of your patient.      Sincerely,    Carla Boyd MD

## 2024-06-18 ENCOUNTER — TELEPHONE (OUTPATIENT)
Dept: ANESTHESIOLOGY | Facility: CLINIC | Age: 44
End: 2024-06-18
Payer: COMMERCIAL

## 2024-06-18 DIAGNOSIS — M79.18 MYOFASCIAL PAIN: ICD-10-CM

## 2024-06-18 NOTE — TELEPHONE ENCOUNTER
Refill request      Medication: methocarbamol (ROBAXIN) 750 MG tablet    Sig: Take 1 tablet (750 mg) by mouth 4 times daily as needed for muscle spasms - Oral    Dispensed: 90  Refills: 1  Last prescribed to patient: 3/8/2024     Last clinic appointment: 4/3/24  Next clinic appointment: Not Scheduled      Preferred pharmacy:    [facility] - [place] - [address]      Refill request routed to the provider to review.

## 2024-06-18 NOTE — TELEPHONE ENCOUNTER
Left Voicemail (1st Attempt) and Sent Mychart (1st Attempt) for the patient to call back and schedule the following:    Appointment type: Return pain  Provider: Dr. Boyd  Return date: Approx. First available  Specialty phone number: [phone]    Additonal Notes: Per in-basket message from KOBE

## 2024-06-20 ENCOUNTER — TELEPHONE (OUTPATIENT)
Dept: ANESTHESIOLOGY | Facility: CLINIC | Age: 44
End: 2024-06-20
Payer: COMMERCIAL

## 2024-06-20 RX ORDER — METHOCARBAMOL 750 MG/1
750 TABLET, FILM COATED ORAL 4 TIMES DAILY PRN
Qty: 90 TABLET | Refills: 1 | Status: SHIPPED | OUTPATIENT
Start: 2024-06-20

## 2024-06-20 NOTE — TELEPHONE ENCOUNTER
Left Voicemail (2nd Attempt) for the patient to call back and schedule the following:    Appointment type: Return pain  Provider: Dr. Boyd  Return date: First available  Specialty phone number: [phone]

## 2024-07-08 ENCOUNTER — TELEPHONE (OUTPATIENT)
Dept: PALLIATIVE MEDICINE | Facility: CLINIC | Age: 44
End: 2024-07-08
Payer: COMMERCIAL

## 2024-07-08 DIAGNOSIS — K86.1 CHRONIC PANCREATITIS, UNSPECIFIED PANCREATITIS TYPE (H): ICD-10-CM

## 2024-07-08 NOTE — TELEPHONE ENCOUNTER
Refill request      Medication: amitriptyline (ELAVIL) 25 MG tablet     Sig: Take 1 tablet (25 mg) by mouth at bedtime     Dispensed: 30  Refills: 3    Last prescribed to patient: 4/3/24     Last clinic appointment: 4/3/24  Next clinic appointment: none noted    Preferred pharmacy:    [facility] - [place] - [address]    Refill request routed to the provider to review.

## 2024-07-25 DIAGNOSIS — G89.29 CHRONIC ABDOMINAL PAIN: ICD-10-CM

## 2024-07-25 DIAGNOSIS — R10.9 CHRONIC ABDOMINAL PAIN: ICD-10-CM

## 2024-07-25 DIAGNOSIS — K86.1 CHRONIC PANCREATITIS, UNSPECIFIED PANCREATITIS TYPE (H): ICD-10-CM

## 2024-07-25 NOTE — TELEPHONE ENCOUNTER
Refill request    Medication: tiZANidine (ZANAFLEX) 2 MG tablet     Sig: Take 1 tablet (2 mg) by mouth 3 times daily as needed for muscle spasms     Dispensed: 90  Refills: 2    Last prescribed to patient: 4/3/24     Last clinic appointment: 4/3/24  Next clinic appointment: none listed    Preferred pharmacy:    [facility] - [place] - [address]      Refill request routed to the provider to review.

## 2024-08-02 RX ORDER — TIZANIDINE 2 MG/1
2 TABLET ORAL 3 TIMES DAILY PRN
Qty: 90 TABLET | Refills: 2 | Status: SHIPPED | OUTPATIENT
Start: 2024-08-02

## 2024-10-19 ENCOUNTER — HEALTH MAINTENANCE LETTER (OUTPATIENT)
Age: 44
End: 2024-10-19

## 2024-11-13 ENCOUNTER — MYC MEDICAL ADVICE (OUTPATIENT)
Dept: ANESTHESIOLOGY | Facility: CLINIC | Age: 44
End: 2024-11-13
Payer: COMMERCIAL

## 2024-11-13 DIAGNOSIS — K86.1 CHRONIC PANCREATITIS, UNSPECIFIED PANCREATITIS TYPE (H): ICD-10-CM

## 2024-11-13 NOTE — TELEPHONE ENCOUNTER
Left message with patient regarding refill request sent by pharmacy for Elavil.  Patient has not been seen since 5.2023 and will need a follow up visit with provider. Also, asked if patient is still taking medication. Awaiting patient call back.    Zandra Guthrie, ODETTEN

## 2024-11-13 NOTE — TELEPHONE ENCOUNTER
Refill request    Medication: amitriptyline (ELAVIL) 25 MG tablet     Sig:  Take 1 tablet (25 mg) by mouth at bedtime    Dispensed: 30  Refills: 3    Last prescribed to patient: 7/12/24    Last clinic appointment:4/3/24  Next clinic appointment: none listed    Preferred pharmacy:  [facility] - [place] - [address]    Refill request routed to the provider to review.

## 2024-11-18 ENCOUNTER — TELEPHONE (OUTPATIENT)
Dept: PHYSICAL MEDICINE AND REHAB | Facility: CLINIC | Age: 44
End: 2024-11-18
Payer: COMMERCIAL

## 2024-11-18 NOTE — TELEPHONE ENCOUNTER
Left a message regarding Elavil refill.  A new refill was needed because she ran out of existing fills.  Called in medication for refills. Pharmacy will fill and let patient know when it is ready for pickup.    Zandra Guthrie, ODETTEN

## 2025-03-20 DIAGNOSIS — K86.1 CHRONIC PANCREATITIS, UNSPECIFIED PANCREATITIS TYPE (H): ICD-10-CM

## 2025-03-20 NOTE — TELEPHONE ENCOUNTER
Refill request    Medication: amitriptyline (ELAVIL) 25 MG tablet     Sig: Take 1 tablet (25 mg) by mouth at bedtime     Dispensed: 30  Refills: 3    Last prescribed to patient: 7/12/24     Last clinic appointment: 4/3/24  Next clinic appointment: none listed follow up was to have been 3 months after 4/3/24    Preferred pharmacy:    [facility] - [place] - [address]    Refill request routed to the provider to review.

## 2025-08-04 ENCOUNTER — TELEPHONE (OUTPATIENT)
Dept: ANESTHESIOLOGY | Facility: CLINIC | Age: 45
End: 2025-08-04
Payer: COMMERCIAL

## (undated) DEVICE — SOL WATER IRRIG 1000ML BOTTLE 2F7114

## (undated) DEVICE — ENDO TUBING CO2 SMARTCAP STERILE DISP 100145CO2EXT

## (undated) DEVICE — PACK ENDOSCOPY GI CUSTOM UMMC

## (undated) DEVICE — TRAY PAIN INJECTION 97A 640

## (undated) DEVICE — GLOVE PROTEXIS MICRO 6.0  2D73PM60

## (undated) DEVICE — GLOVE BIOGEL PI ULTRATOUCH G SZ 7.5 42175

## (undated) DEVICE — PREP CHLORAPREP W/ORANGE TINT 10.5ML 260715

## (undated) DEVICE — SPECIMEN TRAP MUCOUS 40ML LUKI C30200A

## (undated) DEVICE — ENDO BITE BLOCK ADULT OMNI-BLOC

## (undated) DEVICE — KIT ENDO FIRST STEP DISINFECTANT 200ML W/POUCH EP-4

## (undated) DEVICE — SUCTION MANIFOLD NEPTUNE 2 SYS 4 PORT 0702-020-000

## (undated) DEVICE — NDL SPINAL 22GA 7" QUINCKE 405149

## (undated) RX ORDER — PROPOFOL 10 MG/ML
INJECTION, EMULSION INTRAVENOUS
Status: DISPENSED
Start: 2023-06-26

## (undated) RX ORDER — LIDOCAINE HYDROCHLORIDE 10 MG/ML
INJECTION, SOLUTION EPIDURAL; INFILTRATION; INTRACAUDAL; PERINEURAL
Status: DISPENSED
Start: 2021-09-08

## (undated) RX ORDER — LIDOCAINE HYDROCHLORIDE 10 MG/ML
INJECTION, SOLUTION EPIDURAL; INFILTRATION; INTRACAUDAL; PERINEURAL
Status: DISPENSED
Start: 2021-08-12

## (undated) RX ORDER — FENTANYL CITRATE 50 UG/ML
INJECTION, SOLUTION INTRAMUSCULAR; INTRAVENOUS
Status: DISPENSED
Start: 2023-06-26

## (undated) RX ORDER — LIDOCAINE HYDROCHLORIDE 10 MG/ML
INJECTION, SOLUTION EPIDURAL; INFILTRATION; INTRACAUDAL; PERINEURAL
Status: DISPENSED
Start: 2021-07-22

## (undated) RX ORDER — FENTANYL CITRATE 50 UG/ML
INJECTION, SOLUTION INTRAMUSCULAR; INTRAVENOUS
Status: DISPENSED
Start: 2021-07-22

## (undated) RX ORDER — LIDOCAINE HYDROCHLORIDE 10 MG/ML
INJECTION, SOLUTION EPIDURAL; INFILTRATION; INTRACAUDAL; PERINEURAL
Status: DISPENSED
Start: 2021-08-19

## (undated) RX ORDER — ONDANSETRON 2 MG/ML
INJECTION INTRAMUSCULAR; INTRAVENOUS
Status: DISPENSED
Start: 2023-06-26

## (undated) RX ORDER — LIDOCAINE HYDROCHLORIDE 10 MG/ML
INJECTION, SOLUTION EPIDURAL; INFILTRATION; INTRACAUDAL; PERINEURAL
Status: DISPENSED
Start: 2021-06-15

## (undated) RX ORDER — LIDOCAINE HYDROCHLORIDE 10 MG/ML
INJECTION, SOLUTION EPIDURAL; INFILTRATION; INTRACAUDAL; PERINEURAL
Status: DISPENSED
Start: 2021-08-05

## (undated) RX ORDER — ONDANSETRON 2 MG/ML
INJECTION INTRAMUSCULAR; INTRAVENOUS
Status: DISPENSED
Start: 2022-03-18

## (undated) RX ORDER — SODIUM CHLORIDE 9 MG/ML
INJECTION, SOLUTION INTRAVENOUS
Status: DISPENSED
Start: 2021-07-22

## (undated) RX ORDER — ONDANSETRON 2 MG/ML
INJECTION INTRAMUSCULAR; INTRAVENOUS
Status: DISPENSED
Start: 2021-06-15

## (undated) RX ORDER — FENTANYL CITRATE 50 UG/ML
INJECTION, SOLUTION INTRAMUSCULAR; INTRAVENOUS
Status: DISPENSED
Start: 2021-09-08

## (undated) RX ORDER — FENTANYL CITRATE 50 UG/ML
INJECTION, SOLUTION INTRAMUSCULAR; INTRAVENOUS
Status: DISPENSED
Start: 2021-06-15

## (undated) RX ORDER — HYDROMORPHONE HCL IN WATER/PF 6 MG/30 ML
PATIENT CONTROLLED ANALGESIA SYRINGE INTRAVENOUS
Status: DISPENSED
Start: 2023-06-26

## (undated) RX ORDER — DEXAMETHASONE SODIUM PHOSPHATE 4 MG/ML
INJECTION, SOLUTION INTRA-ARTICULAR; INTRALESIONAL; INTRAMUSCULAR; INTRAVENOUS; SOFT TISSUE
Status: DISPENSED
Start: 2023-06-26

## (undated) RX ORDER — SODIUM CHLORIDE 9 MG/ML
INJECTION, SOLUTION INTRAVENOUS
Status: DISPENSED
Start: 2021-09-08